# Patient Record
Sex: FEMALE | Race: BLACK OR AFRICAN AMERICAN | Employment: FULL TIME | URBAN - METROPOLITAN AREA
[De-identification: names, ages, dates, MRNs, and addresses within clinical notes are randomized per-mention and may not be internally consistent; named-entity substitution may affect disease eponyms.]

---

## 2017-01-04 ENCOUNTER — HOSPITAL ENCOUNTER (EMERGENCY)
Age: 52
Discharge: HOME | End: 2017-01-04
Attending: INTERNAL MEDICINE | Admitting: INTERNAL MEDICINE
Payer: COMMERCIAL

## 2017-01-04 VITALS
OXYGEN SATURATION: 99 % | HEART RATE: 73 BPM | WEIGHT: 271 LBS | DIASTOLIC BLOOD PRESSURE: 79 MMHG | BODY MASS INDEX: 36.7 KG/M2 | HEIGHT: 72 IN | SYSTOLIC BLOOD PRESSURE: 145 MMHG | RESPIRATION RATE: 10 BRPM | TEMPERATURE: 98 F

## 2017-01-04 DIAGNOSIS — J06.9 URI WITH COUGH AND CONGESTION: Primary | ICD-10-CM

## 2017-01-04 PROCEDURE — 99282 EMERGENCY DEPT VISIT SF MDM: CPT

## 2017-01-04 RX ORDER — PREDNISONE 20 MG/1
20 TABLET ORAL 3 TIMES DAILY
Qty: 9 TAB | Refills: 0 | Status: SHIPPED | OUTPATIENT
Start: 2017-01-04 | End: 2017-01-14

## 2017-01-04 RX ORDER — ALBUTEROL SULFATE 90 UG/1
1-2 AEROSOL, METERED RESPIRATORY (INHALATION)
Qty: 1 INHALER | Refills: 1 | Status: SHIPPED | OUTPATIENT
Start: 2017-01-04 | End: 2019-01-03

## 2017-01-04 RX ORDER — AZITHROMYCIN 250 MG/1
TABLET, FILM COATED ORAL
Qty: 6 TAB | Refills: 0 | Status: SHIPPED | OUTPATIENT
Start: 2017-01-04 | End: 2017-01-09

## 2017-01-05 NOTE — ED PROVIDER NOTES
Patient is a 45 y.o. female presenting with cough. The history is provided by the patient. Cough   The current episode started more than 1 week ago. The problem occurs constantly. Cough characteristics: scant production. There has been no fever. Associated symptoms include rhinorrhea and wheezing. Pertinent negatives include no chest pain, no sweats, no weight loss, no ear congestion, no ear pain, no headaches, no sore throat, no myalgias, no shortness of breath, no nausea, no vomiting and no confusion. Past Medical History:   Diagnosis Date    Acute bronchitis Jan 2015    Acute sinusitis     Anxiety     Arthritis     Back pain     Cocaine abuse with cocaine-induced disorder (HCC)     Delivery normal      x4    Dizziness     Gastrointestinal disorder      \"stomach ulcer\"    Other ill-defined conditions(799.89)      anemia    Palpitations     Peptic ulcer disease     Sciatica     TIA (transient ischemic attack)     Upper respiratory infection        Past Surgical History:   Procedure Laterality Date    Hx tonsillectomy      Hx gyn       tubal ligation    Pr abdomen surgery proc unlisted       ulcer    Hx tonsillectomy           Family History:   Problem Relation Age of Onset    Stroke Mother     Heart Disease Mother        Social History     Social History    Marital status: SINGLE     Spouse name: N/A    Number of children: N/A    Years of education: N/A     Occupational History    Not on file. Social History Main Topics    Smoking status: Current Every Day Smoker     Packs/day: 0.50     Years: 15.00    Smokeless tobacco: Never Used    Alcohol use No    Drug use: No    Sexual activity: No     Other Topics Concern    Not on file     Social History Narrative         ALLERGIES: Penicillins and Hydrocodone    Review of Systems   Constitutional: Negative for weight loss. HENT: Positive for congestion and rhinorrhea. Negative for ear pain and sore throat.     Eyes: Negative for discharge. Respiratory: Positive for cough and wheezing. Negative for shortness of breath. Cardiovascular: Negative for chest pain. Gastrointestinal: Negative for nausea and vomiting. Musculoskeletal: Negative for myalgias. Neurological: Negative for headaches. Psychiatric/Behavioral: Negative for confusion. Vitals:    01/04/17 2006   BP: 145/79   Pulse: 73   Resp: 10   Temp: 98 °F (36.7 °C)   SpO2: 99%   Weight: 122.9 kg (271 lb)   Height: 6' (1.829 m)            Physical Exam   Constitutional: She is oriented to person, place, and time. She appears well-developed and well-nourished. HENT:   Head: Normocephalic and atraumatic. Right Ear: External ear normal.   Left Ear: External ear normal.   Nose: Nose normal.   Mouth/Throat: Oropharynx is clear and moist.   Eyes: Conjunctivae are normal. Pupils are equal, round, and reactive to light. Neck: Normal range of motion. Neck supple. Cardiovascular: Normal rate and regular rhythm. Pulmonary/Chest: Effort normal. No respiratory distress. She has no wheezes. She has no rales. Faint bilateral Rhonchi almost entirely cleared by coughing   Abdominal: Soft. Bowel sounds are normal. She exhibits no distension. There is no tenderness. Musculoskeletal: She exhibits no edema. Neurological: She is alert and oriented to person, place, and time. Skin: Skin is warm and dry. Psychiatric: She has a normal mood and affect.         MDM  Number of Diagnoses or Management Options     Amount and/or Complexity of Data Reviewed  Review and summarize past medical records: yes      ED Course       Procedures

## 2017-01-05 NOTE — ED NOTES
Seen for complaints of cold symptoms x 1 week. Complaints of headache, sneezing, runny nose, some sinus pressure, \"popping\" to both ears, productive cough with moderate amount of thick green sputum, chills. Denies N, V, D, afebrile. No changes to appetite. Emergency Department Nursing Plan of Care       The Nursing Plan of Care is developed from the Nursing assessment and Emergency Department Attending provider initial evaluation. The plan of care may be reviewed in the ED Provider note.     The Plan of Care was developed with the following considerations:   Patient / Family readiness to learn indicated by:verbalized understanding  Persons(s) to be included in education: patient  Barriers to Learning/Limitations:No    Signed     Jacinto Manzano    1/4/2017   8:28 PM

## 2017-01-05 NOTE — DISCHARGE INSTRUCTIONS

## 2017-01-13 ENCOUNTER — APPOINTMENT (OUTPATIENT)
Dept: GENERAL RADIOLOGY | Age: 52
End: 2017-01-13
Attending: EMERGENCY MEDICINE
Payer: COMMERCIAL

## 2017-01-13 ENCOUNTER — APPOINTMENT (OUTPATIENT)
Dept: ULTRASOUND IMAGING | Age: 52
End: 2017-01-13
Attending: EMERGENCY MEDICINE
Payer: COMMERCIAL

## 2017-01-13 ENCOUNTER — HOSPITAL ENCOUNTER (EMERGENCY)
Age: 52
Discharge: HOME | End: 2017-01-13
Attending: EMERGENCY MEDICINE
Payer: COMMERCIAL

## 2017-01-13 VITALS
TEMPERATURE: 98 F | RESPIRATION RATE: 14 BRPM | BODY MASS INDEX: 36.91 KG/M2 | OXYGEN SATURATION: 97 % | WEIGHT: 272.49 LBS | DIASTOLIC BLOOD PRESSURE: 83 MMHG | SYSTOLIC BLOOD PRESSURE: 131 MMHG | HEART RATE: 72 BPM | HEIGHT: 72 IN

## 2017-01-13 DIAGNOSIS — M71.22 BAKER'S CYST OF KNEE, LEFT: Primary | ICD-10-CM

## 2017-01-13 DIAGNOSIS — I49.3 PVC (PREMATURE VENTRICULAR CONTRACTION): ICD-10-CM

## 2017-01-13 LAB
ALBUMIN SERPL BCP-MCNC: 3.5 G/DL (ref 3.5–5)
ALBUMIN/GLOB SERPL: 0.8 {RATIO} (ref 1.1–2.2)
ALP SERPL-CCNC: 117 U/L (ref 45–117)
ALT SERPL-CCNC: 24 U/L (ref 12–78)
ANION GAP BLD CALC-SCNC: 8 MMOL/L (ref 5–15)
AST SERPL W P-5'-P-CCNC: 16 U/L (ref 15–37)
BASOPHILS # BLD AUTO: 0 K/UL (ref 0–0.1)
BASOPHILS # BLD: 1 % (ref 0–1)
BILIRUB SERPL-MCNC: 0.2 MG/DL (ref 0.2–1)
BUN SERPL-MCNC: 9 MG/DL (ref 6–20)
BUN/CREAT SERPL: 12 (ref 12–20)
CALCIUM SERPL-MCNC: 8.6 MG/DL (ref 8.5–10.1)
CHLORIDE SERPL-SCNC: 107 MMOL/L (ref 97–108)
CK SERPL-CCNC: 157 U/L (ref 26–192)
CO2 SERPL-SCNC: 28 MMOL/L (ref 21–32)
CREAT SERPL-MCNC: 0.77 MG/DL (ref 0.55–1.02)
EOSINOPHIL # BLD: 0.3 K/UL (ref 0–0.4)
EOSINOPHIL NFR BLD: 4 % (ref 0–7)
ERYTHROCYTE [DISTWIDTH] IN BLOOD BY AUTOMATED COUNT: 13.7 % (ref 11.5–14.5)
GLOBULIN SER CALC-MCNC: 4.2 G/DL (ref 2–4)
GLUCOSE SERPL-MCNC: 125 MG/DL (ref 65–100)
HCT VFR BLD AUTO: 36.5 % (ref 35–47)
HGB BLD-MCNC: 12.4 G/DL (ref 11.5–16)
LYMPHOCYTES # BLD AUTO: 53 % (ref 12–49)
LYMPHOCYTES # BLD: 3.6 K/UL (ref 0.8–3.5)
MCH RBC QN AUTO: 28.2 PG (ref 26–34)
MCHC RBC AUTO-ENTMCNC: 34 G/DL (ref 30–36.5)
MCV RBC AUTO: 83.1 FL (ref 80–99)
MONOCYTES # BLD: 0.4 K/UL (ref 0–1)
MONOCYTES NFR BLD AUTO: 6 % (ref 5–13)
NEUTS SEG # BLD: 2.5 K/UL (ref 1.8–8)
NEUTS SEG NFR BLD AUTO: 36 % (ref 32–75)
PLATELET # BLD AUTO: 278 K/UL (ref 150–400)
POTASSIUM SERPL-SCNC: 3.6 MMOL/L (ref 3.5–5.1)
PROT SERPL-MCNC: 7.7 G/DL (ref 6.4–8.2)
RBC # BLD AUTO: 4.39 M/UL (ref 3.8–5.2)
SODIUM SERPL-SCNC: 143 MMOL/L (ref 136–145)
TROPONIN I SERPL-MCNC: <0.04 NG/ML
WBC # BLD AUTO: 6.8 K/UL (ref 3.6–11)

## 2017-01-13 PROCEDURE — 71020 XR CHEST PA LAT: CPT

## 2017-01-13 PROCEDURE — 84484 ASSAY OF TROPONIN QUANT: CPT | Performed by: EMERGENCY MEDICINE

## 2017-01-13 PROCEDURE — 85025 COMPLETE CBC W/AUTO DIFF WBC: CPT | Performed by: EMERGENCY MEDICINE

## 2017-01-13 PROCEDURE — 80053 COMPREHEN METABOLIC PANEL: CPT | Performed by: EMERGENCY MEDICINE

## 2017-01-13 PROCEDURE — 36415 COLL VENOUS BLD VENIPUNCTURE: CPT | Performed by: EMERGENCY MEDICINE

## 2017-01-13 PROCEDURE — 93005 ELECTROCARDIOGRAM TRACING: CPT

## 2017-01-13 PROCEDURE — 82550 ASSAY OF CK (CPK): CPT | Performed by: EMERGENCY MEDICINE

## 2017-01-13 PROCEDURE — 99284 EMERGENCY DEPT VISIT MOD MDM: CPT

## 2017-01-13 PROCEDURE — 93971 EXTREMITY STUDY: CPT

## 2017-01-13 PROCEDURE — 73562 X-RAY EXAM OF KNEE 3: CPT

## 2017-01-13 RX ORDER — OXYCODONE AND ACETAMINOPHEN 5; 325 MG/1; MG/1
1 TABLET ORAL
Qty: 20 TAB | Refills: 0 | Status: SHIPPED | OUTPATIENT
Start: 2017-01-13 | End: 2017-05-18

## 2017-01-14 NOTE — ED NOTES
PT ambulates well to restroom. Pt given urine cup and wipe, and instructed on clean catch technique.

## 2017-01-14 NOTE — DISCHARGE INSTRUCTIONS
Baker's Cyst: Care Instructions  Your Care Instructions    A Baker's cyst is a swelling behind the knee. It may cause pain or stiffness when you bend your knee or straighten it all the way. Baker's cysts are also called popliteal cysts. If you have arthritis or another condition that is the cause of the Baker's cyst, your doctor may treat that condition. A Baker's cyst may go away on its own. If not, or if it is causing a lot of discomfort, your doctor may drain the fluid that has built up behind the knee. In some cases, a Baker's cyst is removed in surgery. There are things you can do at home, such as staying off your leg, to reduce the swelling and pain. Follow-up care is a key part of your treatment and safety. Be sure to make and go to all appointments, and call your doctor if you are having problems. It's also a good idea to know your test results and keep a list of the medicines you take. How can you care for yourself at home? · Rest your knee as much as possible. · Ask your doctor if you can take an over-the-counter pain medicine, such as acetaminophen (Tylenol), ibuprofen (Advil, Motrin), or naproxen (Aleve). Be safe with medicines. Read and follow all instructions on the label. · Use a cane, a crutch, a walker, or another device if you need help to get around. These can help rest your knees. · If you have an elastic bandage, make sure it is snug but not so tight that your leg is numb, tingles, or swells below the bandage. Ask your doctor if you can loosen the bandage if it is too tight. · Follow your doctor's instructions about how much weight you can put on your knee. · Stay at a healthy weight. Being overweight puts extra strain on your knee. When should you call for help? Call 911 anytime you think you may need emergency care. For example, call if:  · You have sudden chest pain and shortness of breath, and you cough up blood.   Call your doctor now or seek immediate medical care if:  · You have signs of a blood clot, such as:  ¨ Pain in your calf, thigh, or groin. ¨ Redness and swelling in your leg or groin. · You have sudden swelling, warmth, or pain in any joint. · You have joint pain and a fever or rash. · You have such bad pain that you cannot use the joint. Watch closely for changes in your health, and be sure to contact your doctor if:  · You have mild joint symptoms that continue even with more than 6 weeks of care at home. · You have stomach pain or other problems with your medicine. Where can you learn more? Go to http://vernoica-janene.info/. Enter C574 in the search box to learn more about \"Baker's Cyst: Care Instructions. \"  Current as of: May 23, 2016  Content Version: 11.1  © 9539-8622 [employer]. Care instructions adapted under license by [employer] (which disclaims liability or warranty for this information). If you have questions about a medical condition or this instruction, always ask your healthcare professional. [place] [address] any warranty or liability for your use of this information. Premature Heartbeat: Care Instructions  Your Care Instructions    A premature heartbeat happens when the heart beats earlier than it should. This briefly interrupts the heart's rhythm. You do not usually feel the early heartbeat, and the next beat is stronger. To many people, this feels like a skipped heartbeat or a flutter. If you have no heart problems, premature heartbeats are not a cause for concern. Most people have them at some time. They may happen more often if you drink a lot of caffeine or alcohol or are under stress. Usually, no cause for a premature heartbeat is found, and no treatment is needed. Follow-up care is a key part of your treatment and safety. Be sure to make and go to all appointments, and call your doctor if you are having problems.  It's also a good idea to know your test results and keep a list of the medicines you take. How can you care for yourself at home? · Limit caffeine and other stimulants if they trigger premature heartbeats. · Reduce stress. Avoid people and places that make you feel anxious, if you can. Learn ways to reduce stress, such as biofeedback, guided imagery, and meditation. · Do not smoke or allow others to smoke around you. If you need help quitting, talk to your doctor about stop-smoking programs and medicines. These can increase your chances of quitting for good. · Get at least 30 minutes of exercise on most days of the week. Walking is a good choice. You also may want to do other activities, such as running, swimming, cycling, or playing tennis or team sports. · Get enough sleep. Keep your room dark and quiet, and try to go to bed at the same time every night. · Limit alcohol to 2 drinks a day for men and 1 drink a day for women. Too much alcohol can cause health problems. If drinking alcohol causes more premature heartbeats, do not drink it. · If your doctor prescribes medicine, take it exactly as prescribed. Call your doctor if you think you are having a problem with your medicine. When should you call for help? Call 911 anytime you think you may need emergency care. For example, call if:  · You passed out (lost consciousness). · You have severe shortness of breath. Call your doctor now or seek immediate medical care if:  · You have a heart rate that stays above 120 beats per minute for more than a few minutes. · You are suddenly dizzy. Watch closely for changes in your health, and be sure to contact your doctor if you have any problems. Where can you learn more? Go to http://veronica-janene.info/. Enter K026 in the search box to learn more about \"Premature Heartbeat: Care Instructions. \"  Current as of: January 27, 2016  Content Version: 11.1  © 1731-3002 [employer].  Care instructions adapted under license by [employer] (which disclaims liability or warranty for this information). If you have questions about a medical condition or this instruction, always ask your healthcare professional. [address] any warranty or liability for your use of this information.

## 2017-01-14 NOTE — ED NOTES
Pt resting quietly in bed, verbalizes no needs at this time, call bell within reach, VSS, will continue to monitor.

## 2017-01-14 NOTE — ED PROVIDER NOTES
HPI Comments:   Layne Woods is a 47 y.o. female with a hx of arthritis, PUD, anxiety, and TIA presenting to the ED with her daughter C/O palpitations which started last night. She is also c/o left posterior knee pain which started earlier today while at work. Pt denies birth control use, hormone therapy use, or recent surgeries. She last took several plane trips in 11/2016. Pt denies any FHx of DVT/PE. She is a former smoker that quit 1 year ago. Patient denies calf pain, CP, SOB, or any other symptoms or complaints. PCP: Yunior Retana MD    There are no other complaints, changes or physical findings at this time. Written by RALPH Desouza, as dictated by Aga Orellana MD      The history is provided by the patient. No  was used. Past Medical History:   Diagnosis Date    Acute bronchitis Jan 2015    Acute sinusitis     Anxiety     Arthritis     Back pain     Cocaine abuse with cocaine-induced disorder (HCC)     Delivery normal      x4    Dizziness     Gastrointestinal disorder      \"stomach ulcer\"    Other ill-defined conditions(799.89)      anemia    Palpitations     Peptic ulcer disease     Sciatica     TIA (transient ischemic attack)     Upper respiratory infection        Past Surgical History:   Procedure Laterality Date    Hx tonsillectomy      Hx gyn       tubal ligation    Pr abdomen surgery proc unlisted       ulcer    Hx tonsillectomy           Family History:   Problem Relation Age of Onset    Stroke Mother     Heart Disease Mother        Social History     Social History    Marital status: SINGLE     Spouse name: N/A    Number of children: N/A    Years of education: N/A     Occupational History    Not on file.      Social History Main Topics    Smoking status: Former Smoker     Packs/day: 0.50     Years: 15.00     Quit date: 4/2/2016    Smokeless tobacco: Never Used    Alcohol use No    Drug use: No    Sexual activity: No Other Topics Concern    Not on file     Social History Narrative         ALLERGIES: Penicillins and Hydrocodone    Review of Systems   Constitutional: Negative for activity change, chills and fever. HENT: Negative for congestion and sore throat. Eyes: Negative for pain and redness. Respiratory: Negative for cough, chest tightness and shortness of breath. Cardiovascular: Positive for palpitations. Negative for chest pain. Gastrointestinal: Negative for abdominal pain, diarrhea, nausea and vomiting. Genitourinary: Negative for dysuria, frequency and urgency. Musculoskeletal: Positive for arthralgias (left knee). Negative for back pain and neck pain. No calf pain   Skin: Negative for rash. Neurological: Negative for syncope, light-headedness and headaches. Psychiatric/Behavioral: Negative for confusion. All other systems reviewed and are negative. Vitals:    01/13/17 1945 01/13/17 2048 01/13/17 2100 01/13/17 2145   BP: (!) 172/107 122/74 111/86 131/83   Pulse: 93 72 68 72   Resp: 22 17 16 14   Temp:       SpO2: 99% 98% 99% 97%   Weight:       Height:                Physical Exam   Constitutional: She is oriented to person, place, and time. She appears well-developed and well-nourished. No distress. HENT:   Head: Normocephalic. Nose: Nose normal.   Mouth/Throat: Oropharynx is clear and moist. No oropharyngeal exudate. Eyes: Conjunctivae are normal. Pupils are equal, round, and reactive to light. No scleral icterus. Neck: Normal range of motion. Neck supple. No JVD present. No tracheal deviation present. No thyromegaly present. Cardiovascular: Normal rate, regular rhythm and intact distal pulses. Exam reveals no gallop and no friction rub. No murmur heard. Pulmonary/Chest: Effort normal and breath sounds normal. No stridor. No respiratory distress. She has no wheezes. She has no rales. Abdominal: Soft. Bowel sounds are normal. She exhibits no distension.  There is no tenderness. There is no rebound and no guarding. Musculoskeletal: Normal range of motion. L LE/ knee: No rash; no calf swelling or ttp; +mild ttp and swelling in popliteal fossa;  no increased warmth; full arom without difficulty; stable to varus and valgus stress; negative anterior drawer and lachman; 2+ dp and pt pulses; brisk cr; skin intact; toes up/down;    Lymphadenopathy:     She has no cervical adenopathy. Neurological: She is alert and oriented to person, place, and time. No cranial nerve deficit. She exhibits normal muscle tone. Coordination normal.   Skin: Skin is warm and dry. No rash noted. She is not diaphoretic. No erythema. Psychiatric: She has a normal mood and affect. Her behavior is normal.   Nursing note and vitals reviewed. MDM  Number of Diagnoses or Management Options  Baker's cyst of knee, left:   PVC (premature ventricular contraction):   Diagnosis management comments: DDx: DVT, Baker's cyst, dysrhythmia, ACS       Amount and/or Complexity of Data Reviewed  Clinical lab tests: ordered and reviewed  Tests in the radiology section of CPT®: ordered and reviewed  Tests in the medicine section of CPT®: ordered and reviewed  Independent visualization of images, tracings, or specimens: yes    Risk of Complications, Morbidity, and/or Mortality  General comments: No acute ekg changes; PVC while on telemetry; vss; negative cardiac enzymes; clear cxr; cmp unremarkable; le doppler negative for dvt but positive for baker's cyst; reassured pt; pt agreed to follow up with ortho next week; Mic Ledesma MD      Patient Progress  Patient progress: stable    Procedures    ED EKG interpretation: 17:51  Rhythm: normal sinus rhythm; and regular . Rate (approx.): 86; Axis: normal; MO Interval: normal; QRS interval: normal ; ST/T wave: non-specific changes; This EKG was interpreted by Mic Ledesma MD,ED Provider.       LABORATORY TESTS:  Recent Results (from the past 12 hour(s))   EKG, 12 LEAD, INITIAL    Collection Time: 01/13/17  5:51 PM   Result Value Ref Range    Ventricular Rate 86 BPM    Atrial Rate 86 BPM    P-R Interval 174 ms    QRS Duration 82 ms    Q-T Interval 380 ms    QTC Calculation (Bezet) 454 ms    Calculated P Axis 52 degrees    Calculated R Axis -17 degrees    Calculated T Axis 43 degrees    Diagnosis       Normal sinus rhythm  Cannot rule out Anterior infarct (cited on or before 03-OCT-2015)  When compared with ECG of 03-OCT-2015 06:57,  No significant change was found     CBC WITH AUTOMATED DIFF    Collection Time: 01/13/17  7:48 PM   Result Value Ref Range    WBC 6.8 3.6 - 11.0 K/uL    RBC 4.39 3.80 - 5.20 M/uL    HGB 12.4 11.5 - 16.0 g/dL    HCT 36.5 35.0 - 47.0 %    MCV 83.1 80.0 - 99.0 FL    MCH 28.2 26.0 - 34.0 PG    MCHC 34.0 30.0 - 36.5 g/dL    RDW 13.7 11.5 - 14.5 %    PLATELET 680 394 - 781 K/uL    NEUTROPHILS 36 32 - 75 %    LYMPHOCYTES 53 (H) 12 - 49 %    MONOCYTES 6 5 - 13 %    EOSINOPHILS 4 0 - 7 %    BASOPHILS 1 0 - 1 %    ABS. NEUTROPHILS 2.5 1.8 - 8.0 K/UL    ABS. LYMPHOCYTES 3.6 (H) 0.8 - 3.5 K/UL    ABS. MONOCYTES 0.4 0.0 - 1.0 K/UL    ABS. EOSINOPHILS 0.3 0.0 - 0.4 K/UL    ABS. BASOPHILS 0.0 0.0 - 0.1 K/UL   METABOLIC PANEL, COMPREHENSIVE    Collection Time: 01/13/17  7:48 PM   Result Value Ref Range    Sodium 143 136 - 145 mmol/L    Potassium 3.6 3.5 - 5.1 mmol/L    Chloride 107 97 - 108 mmol/L    CO2 28 21 - 32 mmol/L    Anion gap 8 5 - 15 mmol/L    Glucose 125 (H) 65 - 100 mg/dL    BUN 9 6 - 20 MG/DL    Creatinine 0.77 0.55 - 1.02 MG/DL    BUN/Creatinine ratio 12 12 - 20      GFR est AA >60 >60 ml/min/1.73m2    GFR est non-AA >60 >60 ml/min/1.73m2    Calcium 8.6 8.5 - 10.1 MG/DL    Bilirubin, total 0.2 0.2 - 1.0 MG/DL    ALT 24 12 - 78 U/L    AST 16 15 - 37 U/L    Alk.  phosphatase 117 45 - 117 U/L    Protein, total 7.7 6.4 - 8.2 g/dL    Albumin 3.5 3.5 - 5.0 g/dL    Globulin 4.2 (H) 2.0 - 4.0 g/dL    A-G Ratio 0.8 (L) 1.1 - 2.2     CK W/ REFLX CKMB    Collection Time: 01/13/17  7:48 PM   Result Value Ref Range     26 - 192 U/L   TROPONIN I    Collection Time: 01/13/17  7:48 PM   Result Value Ref Range    Troponin-I, Qt. <0.04 <0.05 ng/mL       IMAGING RESULTS:  DUPLEX LOWER EXT VENOUS LEFT   Final Result   History: Pain and swelling.     Duplex venous Doppler examination was performed from the left inguinal ligament  to the mid-calf. Deep venous structures were well imaged and appeared  compressible throughout. Both wave form and color flow analysis demonstrated  normal flow patterns. Augmentation was demonstrable. There is a 4.6 x 1.2 x 2.9  cm cyst in the left popliteal fossa.     IMPRESSION  IMPRESSION:  NORMAL DUPLEX VENOUS DOPPLER EXAMINATION. Incidental Baker's cyst.  Signed by      Signed Date/Time    Phone Pager     Chelly Coppola 1/13/2017 21:04 [phone]       XR CHEST PA LAT   Final Result   History: Chest pain.     Frontal and lateral views of the chest show clear lungs. The heart, mediastinum  and pulmonary vasculature are normal. There are mild degenerative changes of  the spine.     IMPRESSION  IMPRESSION:  NORMAL CHEST. Signed by      Signed Date/Time    Phone Pager     Chelly Coppola 1/13/2017 20:50 [phone]       XR KNEE LT 3 V   Final Result   EXAM: XR KNEE LT 3 V     INDICATION: pain.     COMPARISON: None.     FINDINGS: Three views of the left knee demonstrate no fracture or other acute  osseous or articular abnormality. There is no effusion. There is mild  degenerative spurring.     IMPRESSION  IMPRESSION: No acute abnormality. Signed by      Signed Date/Time    Phone Pager     Chelly Coppola 1/13/2017 19:32 [phone]           IMPRESSION:  1. Baker's cyst of knee, left    2. PVC (premature ventricular contraction)        PLAN:  1.    Current Discharge Medication List      START taking these medications    Details   oxyCODONE-acetaminophen (PERCOCET) 5-325 mg per tablet Take 1 Tab by mouth every four (4) hours as needed for Pain. Max Daily Amount: 6 Tabs. Qty: 20 Tab, Refills: 0         CONTINUE these medications which have NOT CHANGED    Details   albuterol (PROVENTIL HFA, VENTOLIN HFA, PROAIR HFA) 90 mcg/actuation inhaler Take 1-2 Puffs by inhalation every four (4) hours as needed for Wheezing. Qty: 1 Inhaler, Refills: 1      predniSONE (DELTASONE) 20 mg tablet Take 1 Tab by mouth three (3) times daily for 10 days. With Breakfast  Qty: 9 Tab, Refills: 0      ALPRAZolam (XANAX) 1 mg tablet Take 1 Tab by mouth three (3) times daily as needed. Qty: 20 Tab, Refills: 0           2. Follow-up Information     Follow up With Details Comments [address] MD Schedule an appointment as soon as possible for a visit in 3 days  [address] [place] [address]  [phone]          Return to ED if worse     Discharge Note:  9:51 PM  The patient is ready for discharge. The patient's signs, symptoms, diagnosis, and discharge instruction have been discussed and the patient has conveyed their understanding. The patient is to follow up as recommended or return to the ER should their symptoms worsen. Plan has been discussed and the patient is in agreement. Written by Pham Ly, ED Scribe, as dictated by Stephania Kline MD.     Attestation: This note is prepared by Pham Ly, acting as Scribe for Stephania Kline MD.    Stephania Kline MD: The scribe's documentation has been prepared under my direction and personally reviewed by me in its entirety. I confirm that the note above accurately reflects all work, treatment, procedures, and medical decision making performed by me.

## 2017-01-16 LAB
ATRIAL RATE: 86 BPM
CALCULATED P AXIS, ECG09: 52 DEGREES
CALCULATED R AXIS, ECG10: -17 DEGREES
CALCULATED T AXIS, ECG11: 43 DEGREES
DIAGNOSIS, 93000: NORMAL
P-R INTERVAL, ECG05: 174 MS
Q-T INTERVAL, ECG07: 380 MS
QRS DURATION, ECG06: 82 MS
QTC CALCULATION (BEZET), ECG08: 454 MS
VENTRICULAR RATE, ECG03: 86 BPM

## 2017-02-12 ENCOUNTER — HOSPITAL ENCOUNTER (EMERGENCY)
Age: 52
Discharge: HOME | End: 2017-02-12
Attending: EMERGENCY MEDICINE
Payer: COMMERCIAL

## 2017-02-12 VITALS
DIASTOLIC BLOOD PRESSURE: 87 MMHG | SYSTOLIC BLOOD PRESSURE: 127 MMHG | OXYGEN SATURATION: 100 % | BODY MASS INDEX: 36.7 KG/M2 | HEART RATE: 92 BPM | HEIGHT: 72 IN | RESPIRATION RATE: 20 BRPM | TEMPERATURE: 100 F | WEIGHT: 271 LBS

## 2017-02-12 DIAGNOSIS — R68.89 FLU-LIKE SYMPTOMS: Primary | ICD-10-CM

## 2017-02-12 LAB
FLUAV AG NPH QL IA: NEGATIVE
FLUBV AG NOSE QL IA: NEGATIVE

## 2017-02-12 PROCEDURE — 87804 INFLUENZA ASSAY W/OPTIC: CPT | Performed by: EMERGENCY MEDICINE

## 2017-02-12 PROCEDURE — 74011250637 HC RX REV CODE- 250/637: Performed by: EMERGENCY MEDICINE

## 2017-02-12 PROCEDURE — 99283 EMERGENCY DEPT VISIT LOW MDM: CPT

## 2017-02-12 RX ORDER — IBUPROFEN 800 MG/1
800 TABLET ORAL
Qty: 20 TAB | Refills: 0 | Status: SHIPPED | OUTPATIENT
Start: 2017-02-12 | End: 2017-02-19

## 2017-02-12 RX ORDER — IBUPROFEN 400 MG/1
800 TABLET ORAL
Status: COMPLETED | OUTPATIENT
Start: 2017-02-12 | End: 2017-02-12

## 2017-02-12 RX ADMIN — IBUPROFEN 800 MG: 400 TABLET, FILM COATED ORAL at 09:13

## 2017-02-12 NOTE — DISCHARGE INSTRUCTIONS
Influenza (Flu) Vaccine: Care Instructions  Your Care Instructions  Influenza (flu) is an infection in the lungs and breathing passages. It is caused by the influenza virus. There are different strains, or types, of the flu virus every year. The flu comes on quickly. It can cause a cough, stuffy nose, fever, chills, tiredness, and aches and pains. These symptoms may last up to 10 days. The flu can make you feel very sick, but most of the time it doesn't lead to other problems. But it can cause serious problems in people who are older or who have a long-term illness, such as heart disease or diabetes. You can help prevent the flu by getting a flu vaccine every year, as soon as it is available. You cannot get the flu from the vaccine. The vaccine prevents most cases of the flu. But even when the vaccine doesn't prevent the flu, it can make symptoms less severe and reduce the chance of problems from the flu. Sometimes, young children and people who have an immune system problem may have a slight fever or muscle aches or pains 6 to 12 hours after getting the shot. These symptoms usually last 1 or 2 days. Follow-up care is a key part of your treatment and safety. Be sure to make and go to all appointments, and call your doctor if you are having problems. It's also a good idea to know your test results and keep a list of the medicines you take. Who should get the flu vaccine? Everyone age 7 months or older should get a flu vaccine each year. It lowers the chance of getting and spreading the flu. The vaccine is very important for people who are at high risk for getting other health problems from the flu. This includes:  · Anyone 48years of age or older. · People who live in a long-term care center, such as a nursing home. · All children 6 months through 25years of age. · Adults and children 6 months and older who have long-term heart or lung problems, such as asthma.   · Adults and children 6 months and older who needed medical care or were in a hospital during the past year because of diabetes, chronic kidney disease, or a weak immune system (including HIV or AIDS). · Women who will be pregnant during the flu season. · People who have any condition that can make it hard to breathe or swallow (such as a brain injury or muscle disorders). · People who can give the flu to others who are at high risk for problems from the flu. This includes all health care workers and close contacts of people age 72 or older. Who should not get the flu vaccine? The person who gives the vaccine may tell you not to get it if you:  · Have a severe allergy to eggs or any part of the vaccine. · Have had a severe reaction to a flu vaccine in the past.  · Have had Guillain-Barré syndrome (GBS). · Are sick with a fever. (Get the vaccine when symptoms are gone.)  How can you care for yourself at home? · If you or your child has a sore arm or a slight fever after the shot, take an over-the-counter pain medicine, such as acetaminophen (Tylenol) or ibuprofen (Advil, Motrin). Read and follow all instructions on the label. Do not give aspirin to anyone younger than 20. It has been linked to Reye syndrome, a serious illness. · Do not take two or more pain medicines at the same time unless the doctor told you to. Many pain medicines have acetaminophen, which is Tylenol. Too much acetaminophen (Tylenol) can be harmful. When should you call for help? Call 911 anytime you think you may need emergency care. For example, call if after getting the flu vaccine:  · You have symptoms of a severe reaction to the flu vaccine. Symptoms of a severe reaction may include:  ¨ Severe difficulty breathing. ¨ Sudden raised, red areas (hives) all over your body. ¨ Severe lightheadedness. Call your doctor now or seek immediate medical care if after getting the flu vaccine:  · You think you are having a reaction to the flu vaccine, such as a new fever.   Watch closely for changes in your health, and be sure to contact your doctor if you have any problems. Where can you learn more? Go to http://veronica-janene.info/. Enter F991 in the search box to learn more about \"Influenza (Flu) Vaccine: Care Instructions. \"  Current as of: August 1, 2016  Content Version: 11.1  © 1772-5725 [employer]. Care instructions adapted under license by [employer] (which disclaims liability or warranty for this information). If you have questions about a medical condition or this instruction, always ask your healthcare professional. [address] any warranty or liability for your use of this information.

## 2017-02-12 NOTE — ED PROVIDER NOTES
Patient is a 47 y.o. female presenting with general illness and flu symptoms. The history is provided by the patient and medical records. No  was used. Generalized Body Aches   Associated symptoms include headaches. Flu   This is a new problem. The current episode started yesterday. The problem occurs constantly. The problem has not changed since onset. The cough is non-productive. Patient reports a subjective fever - was not measured. Associated symptoms include chills, sweats, headaches and myalgias. Treatments tried: goodys. The treatment provided mild relief. Her past medical history is significant for bronchitis. Pt with granddaughter pos for flu, began having sx yesterday, unvaccinated. Past Medical History:   Diagnosis Date    Acute bronchitis Jan 2015    Acute sinusitis     Anxiety     Arthritis     Back pain     Cocaine abuse with cocaine-induced disorder (HCC)     Delivery normal      x4    Dizziness     Gastrointestinal disorder      \"stomach ulcer\"    Other ill-defined conditions(799.89)      anemia    Palpitations     Peptic ulcer disease     Sciatica     TIA (transient ischemic attack)     Upper respiratory infection        Past Surgical History:   Procedure Laterality Date    Hx tonsillectomy      Hx gyn       tubal ligation    Pr abdomen surgery proc unlisted       ulcer    Hx tonsillectomy           Family History:   Problem Relation Age of Onset    Stroke Mother     Heart Disease Mother        Social History     Social History    Marital status: SINGLE     Spouse name: N/A    Number of children: N/A    Years of education: N/A     Occupational History    Not on file.      Social History Main Topics    Smoking status: Former Smoker     Packs/day: 0.50     Years: 15.00     Quit date: 4/2/2016    Smokeless tobacco: Never Used    Alcohol use No    Drug use: No    Sexual activity: No     Other Topics Concern    Not on file     Social History Narrative         ALLERGIES: Penicillins and Hydrocodone    Review of Systems   Constitutional: Positive for chills and fever. Respiratory: Positive for cough. Musculoskeletal: Positive for myalgias. Neurological: Positive for headaches. All other systems reviewed and are negative. Vitals:    02/12/17 0811   BP: (!) 155/95   Pulse: (!) 109   Resp: 20   Temp: 100 °F (37.8 °C)   SpO2: 100%   Weight: 122.9 kg (271 lb)   Height: 6' (1.829 m)            Physical Exam   Constitutional: She is oriented to person, place, and time. She appears well-developed and well-nourished. No distress. Obese Black female, febrile     HENT:   Head: Normocephalic and atraumatic. Right Ear: External ear normal.   Left Ear: External ear normal.   Nose: Nose normal.   Mouth/Throat: Oropharynx is clear and moist. No oropharyngeal exudate. Eyes: Conjunctivae and EOM are normal. Pupils are equal, round, and reactive to light. Right eye exhibits no discharge. Left eye exhibits no discharge. No scleral icterus. Neck: Normal range of motion. Neck supple. No tracheal deviation present. No thyromegaly present. Cardiovascular: Normal rate, regular rhythm and normal heart sounds. No murmur heard. Pulmonary/Chest: Effort normal and breath sounds normal. No respiratory distress. She has no wheezes. She has no rales. Abdominal: Soft. Bowel sounds are normal.   Musculoskeletal: Normal range of motion. Lymphadenopathy:     She has no cervical adenopathy. Neurological: She is alert and oriented to person, place, and time. Skin: Skin is warm and dry. She is not diaphoretic. No erythema. Psychiatric: She has a normal mood and affect. Her behavior is normal.   Nursing note and vitals reviewed.        [facility]  ED Course       Procedures

## 2017-02-12 NOTE — ED NOTES
Discharge instructions were given to the patient by Jessica Hinson RN. Patient was given 1 prescriptions and was encouraged to call or return to the ED for worsening issues or problems and was encouraged to schedule a follow up appointment for continuing care. Patient given a current medication reconciliation form and verbalized understanding of their medications and importance of discussing medications at follow-up. The patient verbalized understanding of discharge instructions and prescriptions, all questions were answered. The patient has no further concerns at this time. Patient stable at time of discharge. The patient left the Emergency Department ambulatory, with self, and in no acute distress. Patient declined wheelchair transport out of Emergency Department.

## 2017-05-18 ENCOUNTER — HOSPITAL ENCOUNTER (EMERGENCY)
Age: 52
Discharge: HOME | End: 2017-05-18
Attending: INTERNAL MEDICINE | Admitting: INTERNAL MEDICINE
Payer: SELF-PAY

## 2017-05-18 VITALS
RESPIRATION RATE: 18 BRPM | BODY MASS INDEX: 35.76 KG/M2 | HEIGHT: 72 IN | WEIGHT: 264 LBS | DIASTOLIC BLOOD PRESSURE: 81 MMHG | HEART RATE: 107 BPM | SYSTOLIC BLOOD PRESSURE: 144 MMHG | TEMPERATURE: 100.6 F | OXYGEN SATURATION: 96 %

## 2017-05-18 DIAGNOSIS — J01.00 ACUTE MAXILLARY SINUSITIS, RECURRENCE NOT SPECIFIED: Primary | ICD-10-CM

## 2017-05-18 PROCEDURE — 99283 EMERGENCY DEPT VISIT LOW MDM: CPT

## 2017-05-18 PROCEDURE — 74011250637 HC RX REV CODE- 250/637: Performed by: INTERNAL MEDICINE

## 2017-05-18 RX ORDER — IBUPROFEN 400 MG/1
800 TABLET ORAL
Status: COMPLETED | OUTPATIENT
Start: 2017-05-18 | End: 2017-05-18

## 2017-05-18 RX ORDER — BUTALBITAL, ACETAMINOPHEN AND CAFFEINE 300; 40; 50 MG/1; MG/1; MG/1
1 CAPSULE ORAL
Qty: 20 CAP | Refills: 0 | Status: SHIPPED | OUTPATIENT
Start: 2017-05-18 | End: 2017-07-03

## 2017-05-18 RX ORDER — ACETAMINOPHEN 325 MG/1
650 TABLET ORAL
Status: COMPLETED | OUTPATIENT
Start: 2017-05-18 | End: 2017-05-18

## 2017-05-18 RX ORDER — AZITHROMYCIN 250 MG/1
TABLET, FILM COATED ORAL
Qty: 6 TAB | Refills: 0 | Status: SHIPPED | OUTPATIENT
Start: 2017-05-18 | End: 2017-05-23

## 2017-05-18 RX ADMIN — ACETAMINOPHEN 650 MG: 325 TABLET ORAL at 18:14

## 2017-05-18 RX ADMIN — IBUPROFEN 800 MG: 400 TABLET, FILM COATED ORAL at 18:14

## 2017-05-18 NOTE — LETTER
Remy 82 
[facility] EMERGENCY DEPT 
[address] [place] [address] [id number] [phone] Work/School Note Date: 5/18/2017 To Whom It May concern: 
 
Rafa Argueta was seen and treated today in the emergency room by the following provider(s): 
Attending Provider: Nash Murphy MD.   
 
Rafa Argueta may return to work on 5/20/17.  
 
Sincerely, 
 
 
 
 
Jessica Ingram

## 2017-05-18 NOTE — ED TRIAGE NOTES
Sore throat, ear pain, fever/chills, and gen.  Body aches starting today around lunchtime, works at a

## 2017-05-18 NOTE — ED PROVIDER NOTES
HPI Comments: Rafa Argueta is a 47 y.o. female, pmhx significant for anemia, recurrent URI/sinusitis, PUD, and anxiety, who presents ambulatory to the ED c/o headache x 1200, and productive cough with green sputum x 1 day. Pt also reports associated myalgias, sore throat, pain with swallowing, bilateral ear pain, and fever x 1 day. Pt does not endorse any exacerbating or alleviating factors for her recent symptom onset. Pt denies taking any OTC medications for her sxs. Pt states that she works in a , and may have had sick contact. Pt denies nausea, vomiting, diarrhea, chest pain, or SOB. Pt denies h/o DM, or HTN    PCP: Franck Miranda MD    Occupational Hx:   PSHx: Significant for tubal ligation, tonsillectomy, sinus surgery   Social Hx: - tobacco (former), - EtOH, - Illicit Drugs (h/o cocaine abuse, denies drug abuse at this time)    There are no other complaints, changes, or physical findings at this time. The history is provided by the patient. No  was used.         Past Medical History:   Diagnosis Date    Acute bronchitis Jan 2015    Acute sinusitis     Anxiety     Arthritis     Back pain     Cocaine abuse with cocaine-induced disorder (HCC)     Delivery normal     x4    Dizziness     Gastrointestinal disorder     \"stomach ulcer\"    Other ill-defined conditions     anemia    Palpitations     Peptic ulcer disease     Sciatica     TIA (transient ischemic attack)     Upper respiratory infection        Past Surgical History:   Procedure Laterality Date    ABDOMEN SURGERY PROC UNLISTED      ulcer    HX GYN      tubal ligation    HX HEENT      sinus surgery    HX TONSILLECTOMY      HX TONSILLECTOMY           Family History:   Problem Relation Age of Onset    Stroke Mother     Heart Disease Mother        Social History     Social History    Marital status: SINGLE     Spouse name: N/A    Number of children: N/A    Years of education: N/A Occupational History    Not on file. Social History Main Topics    Smoking status: Former Smoker     Packs/day: 0.50     Years: 15.00     Quit date: 4/2/2016    Smokeless tobacco: Never Used    Alcohol use No    Drug use: No    Sexual activity: No     Other Topics Concern    Not on file     Social History Narrative         ALLERGIES: Penicillins and Hydrocodone    Review of Systems   Constitutional: Positive for fever. Negative for activity change, appetite change, chills, diaphoresis and unexpected weight change. HENT: Positive for ear pain and sore throat. Negative for congestion, hearing loss, rhinorrhea, sinus pressure, sneezing and trouble swallowing. Eyes: Negative for pain, redness, itching and visual disturbance. Respiratory: Positive for cough. Negative for shortness of breath and wheezing. Cardiovascular: Negative for chest pain, palpitations and leg swelling. Gastrointestinal: Negative for abdominal pain, constipation, diarrhea, nausea and vomiting. Genitourinary: Negative for dysuria. Musculoskeletal: Positive for myalgias. Negative for arthralgias and gait problem. Skin: Negative for color change, pallor, rash and wound. Neurological: Negative for tremors, weakness, light-headedness, numbness and headaches. All other systems reviewed and are negative. Patient Vitals for the past 12 hrs:   Temp Pulse Resp BP SpO2   05/18/17 1939 (!) 100.6 °F (38.1 °C) - - - -   05/18/17 1902 (!) 102.7 °F (39.3 °C) - - - -   05/18/17 1840 (!) 102.3 °F (39.1 °C) - - - -   05/18/17 1818 - (!) 107 18 - 96 %   05/18/17 1758 - (!) 113 18 144/81 -   05/18/17 1755 (!) 100.9 °F (38.3 °C) (!) 115 18 (!) 156/101 100 %     Physical Exam   Constitutional: She is oriented to person, place, and time. She appears well-developed and well-nourished. HENT:   Head: Normocephalic and atraumatic.    Mouth/Throat: Oropharynx is clear and moist.   Frontal sinus pain   Eyes: Conjunctivae and EOM are normal. Pupils are equal, round, and reactive to light. Neck: Normal range of motion. Neck supple. Cardiovascular: Normal rate, regular rhythm and normal heart sounds. Exam reveals no gallop and no friction rub. No murmur heard. Pulmonary/Chest: Effort normal and breath sounds normal. No respiratory distress. She has no wheezes. She has no rales. Abdominal: Soft. Bowel sounds are normal. She exhibits no distension. There is no tenderness. There is no rebound and no guarding. Musculoskeletal: Normal range of motion. She exhibits no edema or tenderness. Lymphadenopathy:     She has no cervical adenopathy. Neurological: She is alert and oriented to person, place, and time. She has normal strength. No cranial nerve deficit or sensory deficit. She displays a negative Romberg sign. Coordination and gait normal.   Skin: Skin is warm and dry. No ecchymosis, no lesion and no rash noted. Rash is not urticarial. She is not diaphoretic. No erythema. Psychiatric: She has a normal mood and affect. Nursing note and vitals reviewed. MDM  Number of Diagnoses or Management Options  Acute maxillary sinusitis, recurrence not specified:   Diagnosis management comments:   DDx: URI, pharyngitis, strep, viral vs bacterial, sinusitis       Amount and/or Complexity of Data Reviewed  Review and summarize past medical records: yes    Patient Progress  Patient progress: stable    ED Course       Procedures     Progress Note:  7:18 PM  Pt re-evaluated. Pt's physical exam remains benign. Will provider pt with cold water. Chest XR discontinued. Pt is agreeable with plan. Written by RALPH Lenz, as dictated by Brandon Dunn MD.     Progress Note:  7:40 PM  Discussed workup results/findings, and counseled pt regarding her diagnosis. Pt has been encouraged to follow-up as instructed. All questions have been answered, and pt conveyed understanding.   Written by RALPH Lenz, as dictated by Dalila Aaron MD.    MEDICATIONS GIVEN:  Medications   acetaminophen (TYLENOL) tablet 650 mg (650 mg Oral Given 5/18/17 1814)   ibuprofen (MOTRIN) tablet 800 mg (800 mg Oral Given 5/18/17 1814)       IMPRESSION:  1. Acute maxillary sinusitis, recurrence not specified        PLAN:  1. Discharge home. Current Discharge Medication List      START taking these medications    Details   azithromycin (ZITHROMAX Z-ESTELITA) 250 mg tablet Take as directed  Qty: 6 Tab, Refills: 0      butalbital-acetaminophen-caff (FIORICET) -40 mg per capsule Take 1 Cap by mouth every six (6) hours as needed for Pain. Max Daily Amount: 4 Caps. Qty: 20 Cap, Refills: 0           2. Follow-up Information     Follow up With Details Comments [address], [place] In 2 days If symptoms worsen [facility] [phone]          3. Return to ED if worse       DISCHARGE NOTE:  7:40 PM  Patient's results have been reviewed with them. Patient and/or family have verbally conveyed their understanding and agreement of the patient's signs, symptoms, diagnosis, treatment and prognosis and additionally agree to follow up as recommended or return to the Emergency Room should their condition change prior to follow-up. Discharge instructions have also been provided to the patient with some educational information regarding their diagnosis as well a list of reasons why they would want to return to the ER prior to their follow-up appointment should their condition change. This note is prepared by Ilene Benitez, acting as Scribe for MD Dalila Salguero MD: The scribe's documentation has been prepared under my direction and personally reviewed by me in its entirety. I confirm that the note above accurately reflects all work, treatment, procedures, and medical decision making performed by me.

## 2017-05-18 NOTE — DISCHARGE INSTRUCTIONS
Saline Nasal Washes: Care Instructions  Your Care Instructions  Saline nasal washes help keep the nasal passages open by washing out thick or dried mucus. This simple remedy can help relieve symptoms of allergies, sinusitis, and colds. It also can make the nose feel more comfortable by keeping the mucous membranes moist. You may notice a little burning sensation in your nose the first few times you use the solution, but this usually gets better in a few days. Follow-up care is a key part of your treatment and safety. Be sure to make and go to all appointments, and call your doctor if you are having problems. It's also a good idea to know your test results and keep a list of the medicines you take. How can you care for yourself at home? · You can buy premixed saline solution in a squeeze bottle or other sinus rinse products at a drugstore. Read and follow the instructions on the label. · You also can make your own saline solution by adding 1 teaspoon of salt and 1 teaspoon of baking soda to 2 cups of distilled water. · If you use a homemade solution, pour a small amount into a clean bowl. Using a rubber bulb syringe, squeeze the syringe and place the tip in the salt water. Pull a small amount of the salt water into the syringe by relaxing your hand. · Sit down with your head tilted slightly back. Do not lie down. Put the tip of the bulb syringe or the squeeze bottle a little way into one of your nostrils. Gently drip or squirt a few drops into the nostril. Repeat with the other nostril. Some sneezing and gagging are normal at first.  · Gently blow your nose. · Wipe the syringe or bottle tip clean after each use. · Repeat this 2 or 3 times a day. · Use nasal washes gently if you have nosebleeds often. When should you call for help? Watch closely for changes in your health, and be sure to contact your doctor if:  · You often get nosebleeds. · You have problems doing the nasal washes.   Where can you learn more? Go to http://veronica-janene.info/. Enter 071 [id number] [id number] in the search box to learn more about \"Saline Nasal Washes: Care Instructions. \"  Current as of: July 29, 2016  Content Version: 11.2  © 5831-1298 [employer]. Care instructions adapted under license by [employer] - is now [employer] (which disclaims liability or warranty for this information). If you have questions about a medical condition or this instruction, always ask your healthcare professional. [address] any warranty or liability for your use of this information. Sinusitis: Care Instructions  Your Care Instructions    Sinusitis is an infection of the lining of the sinus cavities in your head. Sinusitis often follows a cold. It causes pain and pressure in your head and face. In most cases, sinusitis gets better on its own in 1 to 2 weeks. But some mild symptoms may last for several weeks. Sometimes antibiotics are needed. Follow-up care is a key part of your treatment and safety. Be sure to make and go to all appointments, and call your doctor if you are having problems. It's also a good idea to know your test results and keep a list of the medicines you take. How can you care for yourself at home? · Take an over-the-counter pain medicine, such as acetaminophen (Tylenol), ibuprofen (Advil, Motrin), or naproxen (Aleve). Read and follow all instructions on the label. · If the doctor prescribed antibiotics, take them as directed. Do not stop taking them just because you feel better. You need to take the full course of antibiotics. · Be careful when taking over-the-counter cold or flu medicines and Tylenol at the same time. Many of these medicines have acetaminophen, which is Tylenol. Read the labels to make sure that you are not taking more than the recommended dose. Too much acetaminophen (Tylenol) can be harmful.   · Breathe warm, moist air from a steamy shower, a hot bath, or a sink filled with hot water. Avoid cold, dry air. Using a humidifier in your home may help. Follow the directions for cleaning the machine. · Use saline (saltwater) nasal washes to help keep your nasal passages open and wash out mucus and bacteria. You can buy saline nose drops at a grocery store or drugstore. Or you can make your own at home by adding 1 teaspoon of salt and 1 teaspoon of baking soda to 2 cups of distilled water. If you make your own, fill a bulb syringe with the solution, insert the tip into your nostril, and squeeze gently. Natalie Bienenstock your nose. · Put a hot, wet towel or a warm gel pack on your face 3 or 4 times a day for 5 to 10 minutes each time. · Try a decongestant nasal spray like oxymetazoline (Afrin). Do not use it for more than 3 days in a row. Using it for more than 3 days can make your congestion worse. When should you call for help? Call your doctor now or seek immediate medical care if:  · You have new or worse swelling or redness in your face or around your eyes. · You have a new or higher fever. Watch closely for changes in your health, and be sure to contact your doctor if:  · You have new or worse facial pain. · The mucus from your nose becomes thicker (like pus) or has new blood in it. · You are not getting better as expected. Where can you learn more? Go to http://veronica-janene.info/. Enter L456 in the search box to learn more about \"Sinusitis: Care Instructions. \"  Current as of: July 29, 2016  Content Version: 11.2  © 7027-8411 [employer]. Care instructions adapted under license by [employer] (which disclaims liability or warranty for this information). If you have questions about a medical condition or this instruction, always ask your healthcare professional. [address] any warranty or liability for your use of this information.

## 2017-05-18 NOTE — ED NOTES
Bedside and Verbal shift change report given to Saranya Saez RN (oncoming nurse) by Lee Gates RN (offgoing nurse). Report included the following information SBAR, ED Summary, Procedure Summary and MAR.

## 2017-05-18 NOTE — ED NOTES
Pt medically cleared for discharge by MD. Pt temp 100.6 F. Pt left ED ambulatory with family members in NAD.

## 2017-05-18 NOTE — ED NOTES
Pt reports sore throat, bilateral ear pain, headache and body aches that began around mid day today, no swelling or redness noted to throat; reports cough with green secretions      Emergency Department Nursing Plan of Care       The Nursing Plan of Care is developed from the Nursing assessment and Emergency Department Attending provider initial evaluation. The plan of care may be reviewed in the ED Provider note.     The Plan of Care was developed with the following considerations:   Patient / Family readiness to learn indicated by:verbalized understanding  Persons(s) to be included in education: patient  Barriers to Learning/Limitations:No    Signed     Omer Sanford RN    5/18/2017   6:07 PM

## 2017-05-19 ENCOUNTER — APPOINTMENT (OUTPATIENT)
Dept: CT IMAGING | Age: 52
End: 2017-05-19
Attending: PHYSICIAN ASSISTANT
Payer: SELF-PAY

## 2017-05-19 ENCOUNTER — HOSPITAL ENCOUNTER (EMERGENCY)
Age: 52
Discharge: HOME | End: 2017-05-19
Attending: EMERGENCY MEDICINE
Payer: SELF-PAY

## 2017-05-19 VITALS
SYSTOLIC BLOOD PRESSURE: 135 MMHG | DIASTOLIC BLOOD PRESSURE: 76 MMHG | OXYGEN SATURATION: 99 % | WEIGHT: 265.65 LBS | RESPIRATION RATE: 18 BRPM | HEART RATE: 96 BPM | BODY MASS INDEX: 35.98 KG/M2 | TEMPERATURE: 99.9 F | HEIGHT: 72 IN

## 2017-05-19 DIAGNOSIS — J02.0 PHARYNGITIS DUE TO STREPTOCOCCUS SPECIES: Primary | ICD-10-CM

## 2017-05-19 LAB
ALBUMIN SERPL BCP-MCNC: 3.5 G/DL (ref 3.5–5)
ALBUMIN/GLOB SERPL: 0.7 {RATIO} (ref 1.1–2.2)
ALP SERPL-CCNC: 112 U/L (ref 45–117)
ALT SERPL-CCNC: 30 U/L (ref 12–78)
ANION GAP BLD CALC-SCNC: 10 MMOL/L (ref 5–15)
AST SERPL W P-5'-P-CCNC: 29 U/L (ref 15–37)
BASOPHILS # BLD AUTO: 0 K/UL (ref 0–0.1)
BASOPHILS # BLD: 0 % (ref 0–1)
BILIRUB SERPL-MCNC: 0.8 MG/DL (ref 0.2–1)
BUN SERPL-MCNC: 9 MG/DL (ref 6–20)
BUN/CREAT SERPL: 11 (ref 12–20)
CALCIUM SERPL-MCNC: 9.2 MG/DL (ref 8.5–10.1)
CHLORIDE SERPL-SCNC: 103 MMOL/L (ref 97–108)
CO2 SERPL-SCNC: 27 MMOL/L (ref 21–32)
CREAT SERPL-MCNC: 0.81 MG/DL (ref 0.55–1.02)
DEPRECATED S PYO AG THROAT QL EIA: POSITIVE
EOSINOPHIL # BLD: 0 K/UL (ref 0–0.4)
EOSINOPHIL NFR BLD: 0 % (ref 0–7)
ERYTHROCYTE [DISTWIDTH] IN BLOOD BY AUTOMATED COUNT: 14.2 % (ref 11.5–14.5)
GLOBULIN SER CALC-MCNC: 4.7 G/DL (ref 2–4)
GLUCOSE SERPL-MCNC: 106 MG/DL (ref 65–100)
HCT VFR BLD AUTO: 38.4 % (ref 35–47)
HETEROPH AB SER QL: NEGATIVE
HGB BLD-MCNC: 13.1 G/DL (ref 11.5–16)
LYMPHOCYTES # BLD AUTO: 12 % (ref 12–49)
LYMPHOCYTES # BLD: 1.6 K/UL (ref 0.8–3.5)
MCH RBC QN AUTO: 28.1 PG (ref 26–34)
MCHC RBC AUTO-ENTMCNC: 34.1 G/DL (ref 30–36.5)
MCV RBC AUTO: 82.4 FL (ref 80–99)
MONOCYTES # BLD: 0.8 K/UL (ref 0–1)
MONOCYTES NFR BLD AUTO: 6 % (ref 5–13)
NEUTS SEG # BLD: 10.7 K/UL (ref 1.8–8)
NEUTS SEG NFR BLD AUTO: 82 % (ref 32–75)
PLATELET # BLD AUTO: 283 K/UL (ref 150–400)
POTASSIUM SERPL-SCNC: 3.6 MMOL/L (ref 3.5–5.1)
PROT SERPL-MCNC: 8.2 G/DL (ref 6.4–8.2)
RBC # BLD AUTO: 4.66 M/UL (ref 3.8–5.2)
SODIUM SERPL-SCNC: 140 MMOL/L (ref 136–145)
WBC # BLD AUTO: 13.2 K/UL (ref 3.6–11)

## 2017-05-19 PROCEDURE — 99282 EMERGENCY DEPT VISIT SF MDM: CPT

## 2017-05-19 PROCEDURE — 86308 HETEROPHILE ANTIBODY SCREEN: CPT | Performed by: PHYSICIAN ASSISTANT

## 2017-05-19 PROCEDURE — 96375 TX/PRO/DX INJ NEW DRUG ADDON: CPT

## 2017-05-19 PROCEDURE — 85025 COMPLETE CBC W/AUTO DIFF WBC: CPT | Performed by: PHYSICIAN ASSISTANT

## 2017-05-19 PROCEDURE — 74011636320 HC RX REV CODE- 636/320: Performed by: EMERGENCY MEDICINE

## 2017-05-19 PROCEDURE — 70491 CT SOFT TISSUE NECK W/DYE: CPT

## 2017-05-19 PROCEDURE — 96365 THER/PROPH/DIAG IV INF INIT: CPT

## 2017-05-19 PROCEDURE — 36415 COLL VENOUS BLD VENIPUNCTURE: CPT | Performed by: PHYSICIAN ASSISTANT

## 2017-05-19 PROCEDURE — 74011250636 HC RX REV CODE- 250/636: Performed by: EMERGENCY MEDICINE

## 2017-05-19 PROCEDURE — 74011000258 HC RX REV CODE- 258: Performed by: PHYSICIAN ASSISTANT

## 2017-05-19 PROCEDURE — 80053 COMPREHEN METABOLIC PANEL: CPT | Performed by: PHYSICIAN ASSISTANT

## 2017-05-19 PROCEDURE — 87880 STREP A ASSAY W/OPTIC: CPT | Performed by: PHYSICIAN ASSISTANT

## 2017-05-19 PROCEDURE — 74011250636 HC RX REV CODE- 250/636: Performed by: PHYSICIAN ASSISTANT

## 2017-05-19 RX ORDER — SODIUM CHLORIDE 9 MG/ML
50 INJECTION, SOLUTION INTRAVENOUS
Status: COMPLETED | OUTPATIENT
Start: 2017-05-19 | End: 2017-05-19

## 2017-05-19 RX ORDER — PREDNISONE 5 MG/1
TABLET ORAL
Qty: 21 TAB | Refills: 0 | Status: SHIPPED | OUTPATIENT
Start: 2017-05-19 | End: 2017-07-03

## 2017-05-19 RX ORDER — DIPHENHYDRAMINE HYDROCHLORIDE 50 MG/ML
50 INJECTION, SOLUTION INTRAMUSCULAR; INTRAVENOUS
Status: COMPLETED | OUTPATIENT
Start: 2017-05-19 | End: 2017-05-19

## 2017-05-19 RX ORDER — SODIUM CHLORIDE 0.9 % (FLUSH) 0.9 %
10 SYRINGE (ML) INJECTION
Status: COMPLETED | OUTPATIENT
Start: 2017-05-19 | End: 2017-05-19

## 2017-05-19 RX ORDER — CEFTRIAXONE 1 G/1
INJECTION, POWDER, FOR SOLUTION INTRAMUSCULAR; INTRAVENOUS
Status: DISCONTINUED
Start: 2017-05-19 | End: 2017-05-19 | Stop reason: HOSPADM

## 2017-05-19 RX ORDER — AZITHROMYCIN 250 MG/1
TABLET, FILM COATED ORAL
Qty: 6 TAB | Refills: 0 | Status: SHIPPED | OUTPATIENT
Start: 2017-05-19 | End: 2017-07-03

## 2017-05-19 RX ADMIN — IOPAMIDOL 100 ML: 612 INJECTION, SOLUTION INTRAVENOUS at 09:16

## 2017-05-19 RX ADMIN — METHYLPREDNISOLONE SODIUM SUCCINATE 125 MG: 125 INJECTION, POWDER, FOR SOLUTION INTRAMUSCULAR; INTRAVENOUS at 09:33

## 2017-05-19 RX ADMIN — CEFTRIAXONE 1 G: 1 INJECTION, POWDER, FOR SOLUTION INTRAMUSCULAR; INTRAVENOUS at 09:34

## 2017-05-19 RX ADMIN — Medication 10 ML: at 09:16

## 2017-05-19 RX ADMIN — SODIUM CHLORIDE 50 ML/HR: 900 INJECTION, SOLUTION INTRAVENOUS at 09:16

## 2017-05-19 RX ADMIN — DIPHENHYDRAMINE HYDROCHLORIDE 50 MG: 50 INJECTION, SOLUTION INTRAMUSCULAR; INTRAVENOUS at 09:33

## 2017-05-19 NOTE — ED PROVIDER NOTES
HPI Comments: Tommy Kirkland is a 47 y.o. female with PMhx significant for arthritis, PUD, and sciatica who presents ambulatory to the ED with cc of 10/10 gradually worsening sore throat x 1 day. She also c/o associated fever, voice change, and trouble swallowing. She notes that her voice has been muffled since late last night (5/18/17). The pt reports being evaluated at [facility] ED yesterday (5/18/17) for similar symptoms and was discharged that same day. She states that they did not test for strep throat at that visit. She notes that she was unable to fill her prescriptions from her ED visit yesterday due to insufficient funds. SHx: -tobacco, -EtOH, -illicit drug use    PCP: Nighat Bautista MD    There are no other complaints, changes or physical findings at this time. The history is provided by the patient. No  was used. Past Medical History:   Diagnosis Date    Acute bronchitis Jan 2015    Acute sinusitis     Anxiety     Arthritis     Back pain     Cocaine abuse with cocaine-induced disorder (HCC)     Delivery normal     x4    Dizziness     Gastrointestinal disorder     \"stomach ulcer\"    Other ill-defined conditions     anemia    Palpitations     Peptic ulcer disease     Sciatica     TIA (transient ischemic attack)     Upper respiratory infection        Past Surgical History:   Procedure Laterality Date    ABDOMEN SURGERY PROC UNLISTED      ulcer    HX GYN      tubal ligation    HX HEENT      sinus surgery    HX TONSILLECTOMY      HX TONSILLECTOMY           Family History:   Problem Relation Age of Onset    Stroke Mother     Heart Disease Mother        Social History     Social History    Marital status: SINGLE     Spouse name: N/A    Number of children: N/A    Years of education: N/A     Occupational History    Not on file.      Social History Main Topics    Smoking status: Former Smoker     Packs/day: 0.50     Years: 15.00     Quit date: 4/2/2016  Smokeless tobacco: Never Used    Alcohol use No    Drug use: No    Sexual activity: No     Other Topics Concern    Not on file     Social History Narrative         ALLERGIES: Penicillins and Hydrocodone    Review of Systems   Constitutional: Positive for fever. Negative for activity change, appetite change, chills, fatigue and unexpected weight change. HENT: Positive for sore throat, trouble swallowing and voice change. Negative for congestion, hearing loss, rhinorrhea and sneezing. Eyes: Negative. Negative for pain and visual disturbance. Respiratory: Negative. Negative for apnea, cough, choking, chest tightness and shortness of breath. Cardiovascular: Negative. Negative for chest pain and palpitations. Gastrointestinal: Negative. Negative for abdominal distention, abdominal pain, blood in stool, diarrhea, nausea and vomiting. Genitourinary: Negative. Negative for difficulty urinating, flank pain, frequency and urgency. No discharge   Musculoskeletal: Negative. Negative for arthralgias, back pain, myalgias and neck stiffness. Skin: Negative. Negative for color change and rash. Neurological: Negative. Negative for dizziness, seizures, syncope, speech difficulty, weakness, numbness and headaches. Hematological: Negative for adenopathy. Psychiatric/Behavioral: Negative. Negative for agitation, behavioral problems, dysphoric mood and suicidal ideas. The patient is not nervous/anxious. Patient Vitals for the past 12 hrs:   Temp Pulse Resp BP SpO2   05/19/17 1012 99.9 °F (37.7 °C) 96 18 135/76 99 %   05/19/17 0816 (!) 101 °F (38.3 °C) (!) 114 17 (!) 116/96 100 %            Physical Exam   Constitutional: She is oriented to person, place, and time. She appears well-developed and well-nourished. No distress. HENT:   Head: Normocephalic and atraumatic.    Right Ear: External ear normal.   Left Ear: External ear normal.   Nose: Nose normal.   Mouth/Throat: Oropharynx is clear and moist. Uvula swelling present. No oropharyngeal exudate. Minimal redness to uvula; no exudate. Muffled voice. Eyes: Conjunctivae and EOM are normal. Pupils are equal, round, and reactive to light. Right eye exhibits no discharge. Left eye exhibits no discharge. No scleral icterus. Neck: Normal range of motion. Neck supple. No JVD present. No tracheal deviation present. Cardiovascular: Normal rate, regular rhythm, normal heart sounds and intact distal pulses. Exam reveals no gallop and no friction rub. No murmur heard. Pulmonary/Chest: Effort normal and breath sounds normal. No stridor. No respiratory distress. She has no wheezes. She has no rales. She exhibits no tenderness. Abdominal: Soft. Bowel sounds are normal. She exhibits no distension and no mass. There is no tenderness. There is no rebound and no guarding. Musculoskeletal: Normal range of motion. She exhibits no edema or tenderness. Lymphadenopathy:     She has no cervical adenopathy. Neurological: She is alert and oriented to person, place, and time. She has normal reflexes. No cranial nerve deficit. She exhibits normal muscle tone. Coordination normal.   Skin: Skin is warm and dry. She is not diaphoretic. Psychiatric: She has a normal mood and affect. Her behavior is normal. Judgment and thought content normal.   Nursing note and vitals reviewed. MDM  Number of Diagnoses or Management Options  Diagnosis management comments:   DDx: pharyngitis, uvulitis, URI.        Amount and/or Complexity of Data Reviewed  Clinical lab tests: ordered and reviewed  Review and summarize past medical records: yes    Patient Progress  Patient progress: stable    ED Course       Procedures    LABORATORY TESTS:  Recent Results (from the past 12 hour(s))   STREP AG SCREEN, GROUP A    Collection Time: 05/19/17  8:33 AM   Result Value Ref Range    Group A Strep Ag ID POSITIVE (A) NEG     MONONUCLEOSIS SCREEN    Collection Time: 05/19/17 8:33 AM   Result Value Ref Range    Mononucleosis screen NEGATIVE  NEG     CBC WITH AUTOMATED DIFF    Collection Time: 05/19/17  8:33 AM   Result Value Ref Range    WBC 13.2 (H) 3.6 - 11.0 K/uL    RBC 4.66 3.80 - 5.20 M/uL    HGB 13.1 11.5 - 16.0 g/dL    HCT 38.4 35.0 - 47.0 %    MCV 82.4 80.0 - 99.0 FL    MCH 28.1 26.0 - 34.0 PG    MCHC 34.1 30.0 - 36.5 g/dL    RDW 14.2 11.5 - 14.5 %    PLATELET 757 218 - 001 K/uL    NEUTROPHILS 82 (H) 32 - 75 %    LYMPHOCYTES 12 12 - 49 %    MONOCYTES 6 5 - 13 %    EOSINOPHILS 0 0 - 7 %    BASOPHILS 0 0 - 1 %    ABS. NEUTROPHILS 10.7 (H) 1.8 - 8.0 K/UL    ABS. LYMPHOCYTES 1.6 0.8 - 3.5 K/UL    ABS. MONOCYTES 0.8 0.0 - 1.0 K/UL    ABS. EOSINOPHILS 0.0 0.0 - 0.4 K/UL    ABS. BASOPHILS 0.0 0.0 - 0.1 K/UL   METABOLIC PANEL, COMPREHENSIVE    Collection Time: 05/19/17  8:33 AM   Result Value Ref Range    Sodium 140 136 - 145 mmol/L    Potassium 3.6 3.5 - 5.1 mmol/L    Chloride 103 97 - 108 mmol/L    CO2 27 21 - 32 mmol/L    Anion gap 10 5 - 15 mmol/L    Glucose 106 (H) 65 - 100 mg/dL    BUN 9 6 - 20 MG/DL    Creatinine 0.81 0.55 - 1.02 MG/DL    BUN/Creatinine ratio 11 (L) 12 - 20      GFR est AA >60 >60 ml/min/1.73m2    GFR est non-AA >60 >60 ml/min/1.73m2    Calcium 9.2 8.5 - 10.1 MG/DL    Bilirubin, total 0.8 0.2 - 1.0 MG/DL    ALT (SGPT) 30 12 - 78 U/L    AST (SGOT) 29 15 - 37 U/L    Alk. phosphatase 112 45 - 117 U/L    Protein, total 8.2 6.4 - 8.2 g/dL    Albumin 3.5 3.5 - 5.0 g/dL    Globulin 4.7 (H) 2.0 - 4.0 g/dL    A-G Ratio 0.7 (L) 1.1 - 2.2         IMAGING RESULTS:  CT NECK SOFT TISSUE W CONT   Final Result   EXAM: CT NECK SOFT TISSUE W CONT     INDICATION: swelling and difficult swallowing     COMPARISON: CTA head neck 5/4/2012. .     CONTRAST: 100 mL of Isovue-300.     TECHNIQUE: Multislice helical CT was performed from the mid calvarium to the  aortic arch during uneventful rapid bolus intravenous contrast administration.   Contiguous 2.5 mm axial images were reconstructed and lung and soft tissue  windows were generated. Coronal and sagittal reformations were generated. CT  dose reduction was achieved through use of a standardized protocol tailored for  this examination and automatic exposure control for dose modulation.      FINDINGS:     There is a mass of the lingual tonsils eccentric towards the right which  measures 1.6 x 2.6 cm to 3.02 since into and fills the vallecula. There is  associated prominence of the lingual tonsils with increased enhancing tissue in  the right measuring 11 x 17 mm and 12 x 18 mm on left. Additionally, there is  heterogeneous prominence of the adenoids. On the prior exam of 2012 there was  some similar appearing but much less severe prominence of the lingual tonsils. There is increased number of nonenlarged cervical chain lymph nodes bilaterally. There is otherwise no mass or enlarged adenopathy identified within the neck.      The carotid and jugular vessels enhance normally.      The thyroid gland, submandibular salivary glands and parotid glands are normal.     No nasopharyngeal, pharyngeal or laryngeal mass is identified.     No abnormalities are identified in the visualized portions of the brain or  orbits.      The visualized mastoid air cells and paranasal sinuses are clear.     The visualized portions of the lung apices are clear.     The musculoskeletal structures show degenerative spine change with no aggressive  lesion or acute fracture.     IMPRESSION  IMPRESSION: Marked lingual tonsillar prominence, tonsillar pillar prominence,  and adenoidal prominence in an atypical pattern for neoplasm and likely  reflecting hypertrophy, though consideration of direct visualization and at  least follow-up imaging is recommended to exclude underlying malignancy.        MEDICATIONS GIVEN:  Medications   cefTRIAXone (ROCEPHIN) 1 gram injection (not administered)   cefTRIAXone (ROCEPHIN) 1 g in 0.9% sodium chloride (MBP/ADV) 50 mL (0 g IntraVENous IV Completed 5/19/17 1015)   methylPREDNISolone (PF) (SOLU-MEDROL) injection 125 mg (125 mg IntraVENous Given 5/19/17 0933)   diphenhydrAMINE (BENADRYL) injection 50 mg (50 mg IntraVENous Given 5/19/17 0933)   sodium chloride (NS) flush 10 mL (10 mL IntraVENous Given 5/19/17 0916)   iopamidol (ISOVUE 300) 61 % contrast injection 100 mL (100 mL IntraVENous Given 5/19/17 0916)   0.9% sodium chloride infusion (0 mL/hr IntraVENous IV Completed 5/19/17 1012)       IMPRESSION:  1. Pharyngitis due to Streptococcus species        PLAN:  1. Current Discharge Medication List      START taking these medications    Details   !! azithromycin (ZITHROMAX Z-ESTELITA) 250 mg tablet 2 today then 1 daily for 4 days  Qty: 6 Tab, Refills: 0      predniSONE (STERAPRED) 5 mg dose pack See administration instruction per 5mg dose pack  Qty: 21 Tab, Refills: 0       !! - Potential duplicate medications found. Please discuss with provider. CONTINUE these medications which have NOT CHANGED    Details   !! azithromycin (ZITHROMAX Z-ESTELITA) 250 mg tablet Take as directed  Qty: 6 Tab, Refills: 0       !! - Potential duplicate medications found. Please discuss with provider. 2.   Follow-up Information     Follow up With Details Comments Noe Shipman MD In 2 days As needed [address] [place] [address] [address] [phone]          Return to ED if worse       DISCHARGE NOTE:  10:08 AM  The patient has been re-evaluated and is ready for discharge. Reviewed available results with patient. Counseled patient on diagnosis and care plan. Patient has expressed understanding, and all questions have been answered. Patient agrees with plan and agrees to follow up as recommended, or return to the ED if their symptoms worsen. Discharge instructions have been provided and explained to the patient, along with reasons to return to the ED.       This note is prepared by Moris Sosa, acting as Scribe for [employer], JERRY.    [facility] [employer], JERRY: The scribe's documentation has been prepared under my direction and personally reviewed by me in its entirety. I confirm that the note above accurately reflects all work, treatment, procedures, and medical decision making performed by me.

## 2017-05-19 NOTE — ED NOTES
HAYDEE Garcia reviewed discharge instructions with the patient and samanta  The patient and samanta verbalized understanding. Patient taken out of treatment area via wheelchair by this RN. Daughter states that she will drive patient home.

## 2017-05-19 NOTE — DISCHARGE INSTRUCTIONS
Rapid Strep Test: About This Test  What is it? A rapid strep test checks the bacteria in your throat to see if strep is the cause of your sore throat. Why is this test done? It may be done so your doctor can find out right away whether you have strep throat. There is another test for strep, called a throat culture, but that test takes a few days to get the results. How can you prepare for the test?  You don't need to do anything before you have this test.  What happens during the test?  · You will be asked to tilt your head back and open your mouth as wide as possible. · Your doctor will press your tongue down with a flat stick (tongue depressor) and then examine your mouth and throat. · A clean cotton swab will be rubbed over the back of your throat, around your tonsils, and over any red areas or sores to collect a sample. How long does the test take? · The test takes less than a minute. · Results are available in 10 to 15 minutes. When should you call for help? Call your doctor now or seek immediate medical care if:  · Your pain gets worse on one side of your throat, or you have trouble opening your mouth. · You have a new or higher fever, or you have a fever with a stiff neck or severe headache. · Swallowing becomes harder, or you have any trouble breathing. · You are sensitive to light or feel very sleepy or confused. Watch closely for changes in your health, and be sure to contact your doctor if:  · You do not start to feel better after 2 days. Follow-up care is a key part of your treatment and safety. Be sure to make and go to all appointments, and call your doctor if you are having problems. It's also a good idea to keep a list of the medicines you take. Ask your doctor when you can expect to have your test results. Where can you learn more? Go to http://veronica-janene.info/.   Enter B356 in the search box to learn more about \"Rapid Strep Test: About This Test.\"  Current as of: July 29, 2016  Content Version: 11.2  © 8687-6007 [employer]. Care instructions adapted under license by [employer] (which disclaims liability or warranty for this information). If you have questions about a medical condition or this instruction, always ask your healthcare professional. [address] any warranty or liability for your use of this information. Strep Throat: Care Instructions  Your Care Instructions    Strep throat is a bacterial infection that causes sudden, severe sore throat and fever. Strep throat, which is caused by bacteria called streptococcus, is treated with antibiotics. Sometimes a strep test is necessary to tell if the sore throat is caused by strep bacteria. Treatment can help ease symptoms and may prevent future problems. Follow-up care is a key part of your treatment and safety. Be sure to make and go to all appointments, and call your doctor if you are having problems. It's also a good idea to know your test results and keep a list of the medicines you take. How can you care for yourself at home? · Take your antibiotics as directed. Do not stop taking them just because you feel better. You need to take the full course of antibiotics. · Strep throat can spread to others until 24 hours after you begin taking antibiotics. During this time, you should avoid contact with other people at work or home, especially infants and children. Do not sneeze or cough on others, and wash your hands often. Keep your drinking glass and eating utensils separate from those of others, and wash these items well in hot, soapy water. · Gargle with warm salt water at least once each hour to help reduce swelling and make your throat feel better. Use 1 teaspoon of salt mixed in 8 fluid ounces of warm water. · Take an over-the-counter pain medication, such as acetaminophen (Tylenol), ibuprofen (Advil, Motrin), or naproxen (Aleve). Read and follow all instructions on the label. · Try an over-the-counter anesthetic throat spray or throat lozenges, which may help relieve throat pain. · Drink plenty of fluids. Fluids may help soothe an irritated throat. Hot fluids, such as tea or soup, may help your throat feel better. · Eat soft solids and drink plenty of clear liquids. Flavored ice pops, ice cream, scrambled eggs, sherbet, and gelatin dessert (such as Jell-O) may also soothe the throat. · Get lots of rest.  · Do not smoke, and avoid secondhand smoke. If you need help quitting, talk to your doctor about stop-smoking programs and medicines. These can increase your chances of quitting for good. · Use a vaporizer or humidifier to add moisture to the air in your bedroom. Follow the directions for cleaning the machine. When should you call for help? Call your doctor now or seek immediate medical care if:  · You have a new or higher fever. · You have a fever with a stiff neck or severe headache. · You have new or worse trouble swallowing. · Your sore throat gets much worse on one side. · Your pain becomes much worse on one side of your throat. Watch closely for changes in your health, and be sure to contact your doctor if:  · You are not getting better after 2 days (48 hours). · You do not get better as expected. Where can you learn more? Go to http://veronica-janene.info/. Enter K625 in the search box to learn more about \"Strep Throat: Care Instructions. \"  Current as of: July 29, 2016  Content Version: 11.2  © 4637-7889 [employer] Care instructions adapted under license by [employer] (which disclaims liability or warranty for this information). If you have questions about a medical condition or this instruction, always ask your healthcare professional. [address] any warranty or liability for your use of this information.

## 2017-07-03 ENCOUNTER — APPOINTMENT (OUTPATIENT)
Dept: GENERAL RADIOLOGY | Age: 52
End: 2017-07-03
Attending: PHYSICIAN ASSISTANT
Payer: SELF-PAY

## 2017-07-03 ENCOUNTER — HOSPITAL ENCOUNTER (EMERGENCY)
Age: 52
Discharge: HOME | End: 2017-07-03
Attending: EMERGENCY MEDICINE
Payer: SELF-PAY

## 2017-07-03 VITALS
RESPIRATION RATE: 16 BRPM | WEIGHT: 263.45 LBS | HEIGHT: 72 IN | BODY MASS INDEX: 35.68 KG/M2 | TEMPERATURE: 98.7 F | OXYGEN SATURATION: 96 % | SYSTOLIC BLOOD PRESSURE: 123 MMHG | HEART RATE: 66 BPM | DIASTOLIC BLOOD PRESSURE: 60 MMHG

## 2017-07-03 DIAGNOSIS — M79.672 LEFT FOOT PAIN: Primary | ICD-10-CM

## 2017-07-03 DIAGNOSIS — R03.0 ELEVATED BLOOD PRESSURE READING: ICD-10-CM

## 2017-07-03 DIAGNOSIS — M77.32 HEEL SPUR, LEFT: ICD-10-CM

## 2017-07-03 PROCEDURE — 73630 X-RAY EXAM OF FOOT: CPT

## 2017-07-03 PROCEDURE — 99283 EMERGENCY DEPT VISIT LOW MDM: CPT

## 2017-07-03 PROCEDURE — 74011250637 HC RX REV CODE- 250/637: Performed by: PHYSICIAN ASSISTANT

## 2017-07-03 RX ORDER — NAPROXEN 500 MG/1
500 TABLET ORAL
Qty: 20 TAB | Refills: 0 | Status: SHIPPED | OUTPATIENT
Start: 2017-07-03 | End: 2017-08-03

## 2017-07-03 RX ORDER — OXYCODONE AND ACETAMINOPHEN 5; 325 MG/1; MG/1
.5-1 TABLET ORAL
Qty: 18 TAB | Refills: 0 | Status: SHIPPED | OUTPATIENT
Start: 2017-07-03 | End: 2017-08-03

## 2017-07-03 RX ORDER — OXYCODONE AND ACETAMINOPHEN 5; 325 MG/1; MG/1
1 TABLET ORAL
Status: COMPLETED | OUTPATIENT
Start: 2017-07-03 | End: 2017-07-03

## 2017-07-03 RX ADMIN — OXYCODONE HYDROCHLORIDE AND ACETAMINOPHEN 1 TABLET: 5; 325 TABLET ORAL at 18:16

## 2017-07-03 NOTE — ED NOTES
Pt reporting has been having left heal pain for past month. Started out with her left heal hurting, and would hurt until she would start to walk on it more throughout the day. Once up and moving about more pain would subside. Pain has then worsened over the past couple days. Currently is experiencing 10/10 pain which worsens with movement. Denies recent injuries, is concerned may be due to arthritis.

## 2017-07-03 NOTE — ROUTINE PROCESS
Kavin HUBBARD reviewed discharge instructions with the patient. The patient verbalized understanding. Assisted to waiting room via wheelchair by RN, alert and stable for discharge.

## 2017-07-03 NOTE — DISCHARGE INSTRUCTIONS
Heel Pain: Care Instructions  Your Care Instructions  You can have heel pain from an injury or from everyday overuse, such as running or walking a lot. Plantar fasciitis is the most common cause of heel pain. In this condition, the bottom of your foot from the front of the heel to the base of the toes is sore and hard to walk on. Your heel can get better with rest, anti-inflammatory pain medicines, and stretching exercises. Follow-up care is a key part of your treatment and safety. Be sure to make and go to all appointments, and call your doctor if you are having problems. Its also a good idea to know your test results and keep a list of the medicines you take. How can you care for yourself at home? · Rest your feet often. Reduce your activity to a level that lets you avoid pain. If possible, do not run or walk on hard surfaces. · Take anti-inflammatory medicines to reduce heel pain. These include ibuprofen (Advil, Motrin) and naproxen (Aleve). Read and follow all instructions on the label. · Put ice or a cold pack on your heel for 10 to 20 minutes at a time. Try to do this every 1 to 2 hours for the next 3 days (when you are awake). Put a thin cloth between the ice and your skin. · If ice isn't helping after 2 or 3 days, try heat, such as a heating pad set on low. · If your doctor says it is okay, try these calf stretches. Tight calf muscles can cause heel pain or make it worse. ¨ Stand about 1 foot from a wall. Place the palms of both hands against the wall at chest level and lean forward against the wall. Put the leg you want to stretch about a step behind your other leg. Keep your back heel on the floor and bend your front knee until you feel a stretch in the back leg. Hold this position for 15 to 30 seconds. Repeat the exercise 2 to 4 times a session. Do 3 to 4 sessions a day. ¨ Sit down on the floor or a mat with your feet stretched in front of you.  Roll up a towel lengthwise, and loop it over the ball of your foot. Holding the towel at both ends, gently pull the towel toward you to stretch your foot. Hold this position for 15 to 30 seconds. Repeat the exercise 2 to 4 times a session. Do 3 to 4 sessions a day. · Wear a night splint if your doctor suggests it. A night splint holds your foot with the toes pointed up. This position gives the bottom of your foot a constant, gentle stretch. · Wear shoes with good arch support. Athletic shoes or shoes with a well-cushioned sole are good choices. · Try a heel lift, heel cup or shoe insert (orthotic) to help cushion your heel. You can buy these at many shoe stores. Use them in both shoes, even if only one foot hurts. · Maintain a healthy weight. This puts less strain on your feet. When should you call for help? Call your doctor now or seek immediate medical care if:  · You have heel pain with fever, redness, or warmth in your heel. · You have numbness or tingling in your heel. Watch closely for changes in your health, and be sure to contact your doctor if:  · You cannot put weight on the sore foot. · Your heel pain lasts more than 2 weeks. Where can you learn more? Go to http://veronica-janene.info/. Enter S299 in the search box to learn more about \"Heel Pain: Care Instructions. \"  Current as of: March 20, 2017  Content Version: 11.3  © 9028-7068 [employer]. Care instructions adapted under license by [employer] (which disclaims liability or warranty for this information). If you have questions about a medical condition or this instruction, always ask your healthcare professional. [place] [address] any warranty or liability for your use of this information. Elevated Blood Pressure: Care Instructions  Your Care Instructions    Blood pressure is a measure of how hard the blood pushes against the walls of your arteries. It's normal for blood pressure to go up and down throughout the day. But if it stays up over time, you have high blood pressure. Two numbers tell you your blood pressure. The first number is the systolic pressure. It shows how hard the blood pushes when your heart is pumping. The second number is the diastolic pressure. It shows how hard the blood pushes between heartbeats, when your heart is relaxed and filling with blood. An ideal blood pressure in adults is less than 120/80 (say \"120 over 80\"). High blood pressure is 140/90 or higher. You have high blood pressure if your top number is 140 or higher or your bottom number is 90 or higher, or both. The main test for high blood pressure is simple, fast, and painless. To diagnose high blood pressure, your doctor will test your blood pressure at different times. After testing your blood pressure, your doctor may ask you to test it again when you are home. If you are diagnosed with high blood pressure, you can work with your doctor to make a long-term plan to manage it. Follow-up care is a key part of your treatment and safety. Be sure to make and go to all appointments, and call your doctor if you are having problems. It's also a good idea to know your test results and keep a list of the medicines you take. How can you care for yourself at home? · Do not smoke. Smoking increases your risk for heart attack and stroke. If you need help quitting, talk to your doctor about stop-smoking programs and medicines. These can increase your chances of quitting for good. · Stay at a healthy weight. · Try to limit how much sodium you eat to less than 2,300 milligrams (mg) a day. Your doctor may ask you to try to eat less than 1,500 mg a day. · Be physically active. Get at least 30 minutes of exercise on most days of the week. Walking is a good choice. You also may want to do other activities, such as running, swimming, cycling, or playing tennis or team sports. · Avoid or limit alcohol.  Talk to your doctor about whether you can drink any alcohol. · Eat plenty of fruits, vegetables, and low-fat dairy products. Eat less saturated and total fats. · Learn how to check your blood pressure at home. When should you call for help? Call your doctor now or seek immediate medical care if:  · Your blood pressure is much higher than normal (such as 180/110 or higher). · You think high blood pressure is causing symptoms such as:  ¨ Severe headache. ¨ Blurry vision. Watch closely for changes in your health, and be sure to contact your doctor if:  · You do not get better as expected. Where can you learn more? Go to http://veronica-janene.info/. Enter R957 in the search box to learn more about \"Elevated Blood Pressure: Care Instructions. \"  Current as of: April 3, 2017  Content Version: 11.3  © 0373-4667 [employer]. Care instructions adapted under license by [employer] (which disclaims liability or warranty for this information). If you have questions about a medical condition or this instruction, always ask your healthcare professional. [place] [address] any warranty or liability for your use of this information.

## 2017-07-03 NOTE — ED PROVIDER NOTES
HPI Comments: Anabelle Rodriguez, 47 y.o. Female with PMHx of arthritis, TIA, presents ambulatory to [facility] ED with cc of left heel pain that was intermittent but is now constant. Patient states that the pain originally began 1 month ago. She notes that the pain increases upon ambulating and upon bending or flexing her foot. She states that she has tried epsom salt baths, and OTC pain medications with no relief in her sx. She denies any other pain. PCP: Vilma Acevedo MD    Social history significant for: - Tobacco (former), - EtOH, - Illicit Drug Use    There are no other complaints, changes, or physical findings at this time. The history is provided by the patient. No  was used. Past Medical History:   Diagnosis Date    Acute bronchitis Jan 2015    Acute sinusitis     Anxiety     Arthritis     Back pain     Cocaine abuse with cocaine-induced disorder (HCC)     Delivery normal     x4    Dizziness     Gastrointestinal disorder     \"stomach ulcer\"    Other ill-defined conditions     anemia    Palpitations     Peptic ulcer disease     Sciatica     TIA (transient ischemic attack)     Upper respiratory infection        Past Surgical History:   Procedure Laterality Date    ABDOMEN SURGERY PROC UNLISTED      ulcer    HX GYN      tubal ligation    HX HEENT      sinus surgery    HX TONSILLECTOMY      HX TONSILLECTOMY           Family History:   Problem Relation Age of Onset    Stroke Mother     Heart Disease Mother        Social History     Social History    Marital status: SINGLE     Spouse name: N/A    Number of children: N/A    Years of education: N/A     Occupational History    Not on file.      Social History Main Topics    Smoking status: Former Smoker     Packs/day: 0.50     Years: 15.00     Quit date: 4/2/2016    Smokeless tobacco: Never Used    Alcohol use No    Drug use: No    Sexual activity: No     Other Topics Concern    Not on file     Social History Narrative         ALLERGIES: Penicillins and Hydrocodone    Review of Systems   Constitutional: Negative. Negative for fever. HENT: Negative. Eyes: Negative. Respiratory: Negative. Cardiovascular: Negative. Gastrointestinal: Negative. Negative for constipation, diarrhea, nausea and vomiting. Denies liver disease   Endocrine:        Denies thyroid disease   Genitourinary: Negative. Negative for dysuria. Denies kidney disease   Musculoskeletal: Positive for arthralgias (left foot). Skin: Negative. Neurological: Negative. All other systems reviewed and are negative. Patient Vitals for the past 12 hrs:   Temp Pulse Resp BP SpO2   07/03/17 1832 - 66 16 123/60 -   07/03/17 1823 - 73 18 (!) 148/101 96 %   07/03/17 1737 98.7 °F (37.1 °C) 77 16 (!) 155/100 99 %       Physical Exam   Constitutional: She is oriented to person, place, and time. She appears well-developed and well-nourished. No distress. HENT:   Head: Normocephalic and atraumatic. Right Ear: External ear normal.   Left Ear: External ear normal.   Nose: Nose normal.   Mouth/Throat: Oropharynx is clear and moist. No oropharyngeal exudate. Eyes: Conjunctivae and EOM are normal. Pupils are equal, round, and reactive to light. Right eye exhibits no discharge. Left eye exhibits no discharge. No scleral icterus. Neck: Normal range of motion. Neck supple. No tracheal deviation present. Cardiovascular: Normal rate, regular rhythm, normal heart sounds and intact distal pulses. Exam reveals no gallop and no friction rub. No murmur heard. Pulmonary/Chest: Effort normal and breath sounds normal. No respiratory distress. She has no wheezes. She has no rales. She exhibits no tenderness. Abdominal: Soft. Bowel sounds are normal. She exhibits no distension and no mass. There is no tenderness. There is no rebound and no guarding.    Musculoskeletal:   + Tenderness to inferior posterior left hind foot  No contusions, no erythema  Patient is ambulating normally  Flexion and extension intact   Lymphadenopathy:     She has no cervical adenopathy. Neurological: She is alert and oriented to person, place, and time. No cranial nerve deficit. Neurovascularly intact   Skin: Skin is warm and dry. No rash noted. No erythema. Psychiatric: She has a normal mood and affect. Her behavior is normal.   Nursing note and vitals reviewed. MDM  Number of Diagnoses or Management Options  Elevated blood pressure reading:   Heel spur, left:   Left foot pain:   Diagnosis management comments: DDx: heel spur, plantar fasciitis, arthritis, strain, sprain       Amount and/or Complexity of Data Reviewed  Tests in the radiology section of CPT®: ordered and reviewed  Review and summarize past medical records: yes  Independent visualization of images, tracings, or specimens: yes    Patient Progress  Patient progress: stable    ED Course       Procedures      LABORATORY TESTS:  No results found for this or any previous visit (from the past 12 hour(s)). IMAGING RESULTS:  XR FOOT LT MIN 3 V   Final Result   INDICATION: Left foot pain for 2 weeks. Worsening and worsening.     Exam: AP, lateral, oblique views of the left foot.     FINDINGS: There is no acute fracture or dislocation. Plantar and posterior  calcaneal spurring is noted. Bones are well-mineralized. Soft tissues are  normal. No radiodense foreign body is seen.     IMPRESSION  IMPRESSION: No acute fracture or dislocation. MEDICATIONS GIVEN:  Medications   oxyCODONE-acetaminophen (PERCOCET) 5-325 mg per tablet 1 Tab (1 Tab Oral Given 7/3/17 1816)       IMPRESSION:  1. Left foot pain    2. Heel spur, left    3. Elevated blood pressure reading        PLAN:  1. Discharge   Current Discharge Medication List      START taking these medications    Details   naproxen (NAPROSYN) 500 mg tablet Take 1 Tab by mouth every twelve (12) hours as needed for Pain.   Qty: 20 Tab, Refills: 0 oxyCODONE-acetaminophen (PERCOCET) 5-325 mg per tablet Take 0.5-1 Tabs by mouth every eight (8) hours as needed for Pain. Max Daily Amount: 3 Tabs. Qty: 18 Tab, Refills: 0           2. Follow-up Information     Follow up With Details Comments Contact Info    Huong Elena DPM Schedule an appointment as soon as possible for a visit foot specialist - [address] [place] [address]  [phone]      [facility] EMERGENCY DEPT  If symptoms worsen [address] [place] [address]  [phone]        Return to ED if worse       Discharge Note:  6:30 PM  The pt is ready for discharge. The pt's signs, symptoms, diagnosis, and discharge instructions have been discussed and pt has conveyed their understanding. The pt is to follow up as recommended or return to ER should their symptoms worsen. Plan has been discussed and pt is in agreement. This note is prepared by Aliya Sifuentes, acting as a Scribe for [employer]. Bk Ray PA-C: The scribe's documentation has been prepared under my direction and personally reviewed by me in its entirety. I confirm that the notes above accurately reflects all work, treatment, procedures, and medical decision making performed by me.

## 2017-08-03 ENCOUNTER — APPOINTMENT (OUTPATIENT)
Dept: GENERAL RADIOLOGY | Age: 52
End: 2017-08-03
Attending: PHYSICIAN ASSISTANT
Payer: SELF-PAY

## 2017-08-03 ENCOUNTER — HOSPITAL ENCOUNTER (EMERGENCY)
Age: 52
Discharge: HOME | End: 2017-08-03
Attending: EMERGENCY MEDICINE
Payer: SELF-PAY

## 2017-08-03 VITALS
RESPIRATION RATE: 18 BRPM | HEART RATE: 72 BPM | BODY MASS INDEX: 35.86 KG/M2 | HEIGHT: 72 IN | WEIGHT: 264.77 LBS | TEMPERATURE: 98.2 F | OXYGEN SATURATION: 96 % | DIASTOLIC BLOOD PRESSURE: 94 MMHG | SYSTOLIC BLOOD PRESSURE: 154 MMHG

## 2017-08-03 DIAGNOSIS — J02.9 SORE THROAT: ICD-10-CM

## 2017-08-03 DIAGNOSIS — J06.9 ACUTE UPPER RESPIRATORY INFECTION: Primary | ICD-10-CM

## 2017-08-03 LAB — DEPRECATED S PYO AG THROAT QL EIA: NEGATIVE

## 2017-08-03 PROCEDURE — 71020 XR CHEST PA LAT: CPT

## 2017-08-03 PROCEDURE — 87880 STREP A ASSAY W/OPTIC: CPT | Performed by: PHYSICIAN ASSISTANT

## 2017-08-03 PROCEDURE — 99283 EMERGENCY DEPT VISIT LOW MDM: CPT

## 2017-08-03 PROCEDURE — 87147 CULTURE TYPE IMMUNOLOGIC: CPT | Performed by: EMERGENCY MEDICINE

## 2017-08-03 PROCEDURE — 87070 CULTURE OTHR SPECIMN AEROBIC: CPT | Performed by: EMERGENCY MEDICINE

## 2017-08-03 PROCEDURE — 74011250637 HC RX REV CODE- 250/637: Performed by: PHYSICIAN ASSISTANT

## 2017-08-03 RX ORDER — BENZONATATE 100 MG/1
100 CAPSULE ORAL
Qty: 30 CAP | Refills: 0 | Status: SHIPPED | OUTPATIENT
Start: 2017-08-03 | End: 2017-08-10

## 2017-08-03 RX ORDER — GUAIFENESIN 100 MG/5ML
200 SOLUTION ORAL
Status: COMPLETED | OUTPATIENT
Start: 2017-08-03 | End: 2017-08-03

## 2017-08-03 RX ORDER — AZITHROMYCIN 250 MG/1
TABLET, FILM COATED ORAL
Qty: 6 TAB | Refills: 0 | Status: SHIPPED | OUTPATIENT
Start: 2017-08-03 | End: 2017-08-08

## 2017-08-03 RX ADMIN — GUAIFENESIN 200 MG: 100 SOLUTION ORAL at 18:31

## 2017-08-03 NOTE — ED TRIAGE NOTES
Patient arrives ambulatory to ED with daughter and granddaughter with complaint sore throat, cough, chest congestion, nausea since Monday. Patient denies V/D, shortness of breath and chest pain.

## 2017-08-03 NOTE — ED PROVIDER NOTES
HPI Comments: Vicente Carrillo is a 47 y.o. female with PMhx significant for anemia, anxiety, and cocaine abuse who presents ambulatory to the ED with cc of progressively worsening sore throat and productive cough which started 3 days ago. Pt also c/o congestion, chills, and chest pain secondary to her cough. She notes that her cough produces green sputum. 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 She is allergic to Penicillin and Hydrocodone. She denies shortness of breath, back pain, abdominal pain, V/D, and fever. Social Hx: - Tobacco, - EtOH, - Illicit Drugs    PCP: Caroline Padgett MD    There are no other complaints, changes or physical findings at this time. The history is provided by the patient. No  was used. Past Medical History:   Diagnosis Date    Acute bronchitis Jan 2015    Acute sinusitis     Anxiety     Arthritis     Back pain     Cocaine abuse with cocaine-induced disorder (HCC)     Delivery normal     x4    Dizziness     Gastrointestinal disorder     \"stomach ulcer\"    Other ill-defined conditions     anemia    Palpitations     Peptic ulcer disease     Sciatica     TIA (transient ischemic attack)     Upper respiratory infection        Past Surgical History:   Procedure Laterality Date    ABDOMEN SURGERY PROC UNLISTED      ulcer    HX GYN      tubal ligation    HX HEENT      sinus surgery    HX TONSILLECTOMY      HX TONSILLECTOMY           Family History:   Problem Relation Age of Onset    Stroke Mother     Heart Disease Mother        Social History     Social History    Marital status: SINGLE     Spouse name: N/A    Number of children: N/A    Years of education: N/A     Occupational History    Not on file.      Social History Main Topics    Smoking status: Former Smoker     Packs/day: 0.50     Years: 15.00     Quit date: 4/2/2016    Smokeless tobacco: Never Used    Alcohol use No    Drug use: No    Sexual activity: No     Other Topics Concern    Not on file     Social History Narrative         ALLERGIES: Penicillins and Hydrocodone    Review of Systems   Constitutional: Positive for chills. Negative for fever. HENT: Positive for congestion and sore throat. Negative for rhinorrhea. Eyes: Negative. Negative for visual disturbance. Respiratory: Positive for cough (productive). Negative for chest tightness, shortness of breath and wheezing. Cardiovascular: Positive for chest pain (only with cough ). Negative for palpitations. Gastrointestinal: Negative. Negative for abdominal pain, constipation, diarrhea, nausea and vomiting. Genitourinary: Negative. Negative for dysuria and hematuria. Musculoskeletal: Negative. Negative for arthralgias, back pain and myalgias. Skin: Negative. Negative for rash. Allergic/Immunologic: Negative. Negative for environmental allergies and food allergies. Neurological: Negative. Negative for headaches. Psychiatric/Behavioral: Negative. Negative for suicidal ideas. Vitals:    08/03/17 1740   BP: (!) 168/101   Pulse: 82   Resp: 18   Temp: 98.2 °F (36.8 °C)   SpO2: 98%   Weight: 120.1 kg (264 lb 12.4 oz)   Height: 6' (1.829 m)            Physical Exam   Constitutional: She is oriented to person, place, and time. She appears well-developed. No distress. Pt is an AAF, awake and alert in NAD. Elevated BMI   HENT:   Head: Normocephalic and atraumatic. Right Ear: Tympanic membrane, external ear and ear canal normal.   Left Ear: Tympanic membrane, external ear and ear canal normal.   Nose: Nose normal.   Mouth/Throat: Uvula is midline and mucous membranes are normal. Posterior oropharyngeal erythema present. No oropharyngeal exudate or posterior oropharyngeal edema. Pt tolerating secretions, no muffled voice sounds. Eyes: Conjunctivae and EOM are normal. Pupils are equal, round, and reactive to light. Right eye exhibits no discharge.  Left eye exhibits no discharge. Neck: Normal range of motion. Cardiovascular: Normal rate, normal heart sounds and intact distal pulses. Pulmonary/Chest: Effort normal and breath sounds normal. No respiratory distress. She has no wheezes. She has no rales. She exhibits no tenderness. Abdominal: Soft. Bowel sounds are normal. There is no tenderness. There is no guarding. No CVA tenderness b/l. Musculoskeletal: Normal range of motion. She exhibits no edema or tenderness. Lymphadenopathy:     She has no cervical adenopathy. Neurological: She is alert and oriented to person, place, and time. Coordination normal.   No focal neuro deficits. Skin: Skin is warm and dry. No rash noted. She is not diaphoretic. No erythema. No pallor. Psychiatric: She has a normal mood and affect. Her behavior is normal.   Vitals reviewed. MDM  Number of Diagnoses or Management Options  Acute upper respiratory infection:   Sore throat:   Diagnosis management comments:   DDx: URI, strep pharyngitis, pneumonia, bronchitis       Amount and/or Complexity of Data Reviewed  Clinical lab tests: ordered and reviewed  Tests in the radiology section of CPT®: ordered and reviewed  Review and summarize past medical records: yes  Independent visualization of images, tracings, or specimens: yes    Patient Progress  Patient progress: stable    ED Course       Procedures    LABORATORY TESTS:  Recent Results (from the past 12 hour(s))   STREP AG SCREEN, GROUP A    Collection Time: 08/03/17  5:46 PM   Result Value Ref Range    Group A Strep Ag ID NEGATIVE  NEG         IMAGING RESULTS:  XR CHEST PA LAT   Final Result   EXAM:  XR CHEST PA LAT     INDICATION:   cough     COMPARISON: 1/13/2017.     FINDINGS: PA and lateral radiographs of the chest demonstrate linear density at  the left lung base laterally consistent with atelectasis. Right lung is clear. .  The cardiac and mediastinal contours and pulmonary vascularity are normal.  The  bones and soft tissues are within normal limits.      IMPRESSION  IMPRESSION: Atelectasis left lung base. MEDICATIONS GIVEN:  Medications   guaiFENesin (ROBITUSSIN) 100 mg/5 mL oral liquid 200 mg (200 mg Oral Given 8/3/17 1831)       IMPRESSION:  1. Acute upper respiratory infection    2. Sore throat        PLAN:  1. Current Discharge Medication List      START taking these medications    Details   azithromycin (ZITHROMAX Z-ESTELITA) 250 mg tablet SIG: Take 2 tabs today; then take 1 tab daily for 4 days  Qty: 6 Tab, Refills: 0      benzonatate (TESSALON PERLES) 100 mg capsule Take 1 Cap by mouth three (3) times daily as needed for Cough for up to 7 days. Qty: 30 Cap, Refills: 0           2. Follow-up Information     Follow up With Details Comments [address], [place] Schedule an appointment as soon as possible for a visit in 2 days  [facility] [address] [place] [address]  [phone]      [facility] EMERGENCY DEPT  As needed or, If symptoms worsen [address] [place] [address]  [phone]        Return to ED if worse   DISCHARGE NOTE  6:28 PM  The patient has been re-evaluated and is ready for discharge. Reviewed available results with patient. Counseled patient on diagnosis and care plan. Patient has expressed understanding, and all questions have been answered. Patient agrees with plan and agrees to follow up as recommended, or return to the ED if their symptoms worsen. Discharge instructions have been provided and explained to the patient, along with reasons to return to the ED. Attestation Note:  This note is prepared by [facility], acting as Scribe for JERRY Abraham PA-C: The scribe's documentation has been prepared under my direction and personally reviewed by me in its entirety. I confirm that the note above accurately reflects all work, treatment, procedures, and medical decision making performed by me.         This note will not be viewable in MyChart.

## 2017-08-03 NOTE — DISCHARGE INSTRUCTIONS
Sore Throat: Care Instructions  Your Care Instructions    Infection by bacteria or a virus causes most sore throats. Cigarette smoke, dry air, air pollution, allergies, and yelling can also cause a sore throat. Sore throats can be painful and annoying. Fortunately, most sore throats go away on their own. If you have a bacterial infection, your doctor may prescribe antibiotics. Follow-up care is a key part of your treatment and safety. Be sure to make and go to all appointments, and call your doctor if you are having problems. It's also a good idea to know your test results and keep a list of the medicines you take. How can you care for yourself at home? · If your doctor prescribed antibiotics, take them as directed. Do not stop taking them just because you feel better. You need to take the full course of antibiotics. · Gargle with warm salt water once an hour to help reduce swelling and relieve discomfort. Use 1 teaspoon of salt mixed in 1 cup of warm water. · Take an over-the-counter pain medicine, such as acetaminophen (Tylenol), ibuprofen (Advil, Motrin), or naproxen (Aleve). Read and follow all instructions on the label. · Be careful when taking over-the-counter cold or flu medicines and Tylenol at the same time. Many of these medicines have acetaminophen, which is Tylenol. Read the labels to make sure that you are not taking more than the recommended dose. Too much acetaminophen (Tylenol) can be harmful. · Drink plenty of fluids. Fluids may help soothe an irritated throat. Hot fluids, such as tea or soup, may help decrease throat pain. · Use over-the-counter throat lozenges to soothe pain. Regular cough drops or hard candy may also help. These should not be given to young children because of the risk of choking. · Do not smoke or allow others to smoke around you. If you need help quitting, talk to your doctor about stop-smoking programs and medicines.  These can increase your chances of quitting for good. · Use a vaporizer or humidifier to add moisture to your bedroom. Follow the directions for cleaning the machine. When should you call for help? Call your doctor now or seek immediate medical care if:  · You have new or worse trouble swallowing. · Your sore throat gets much worse on one side. Watch closely for changes in your health, and be sure to contact your doctor if you do not get better as expected. Where can you learn more? Go to http://veronica-janene.info/. Enter 062 [id number] 80 [id number] in the search box to learn more about \"Sore Throat: Care Instructions. \"  Current as of: July 29, 2016  Content Version: 11.3  © 4330-8128 [employer]. Care instructions adapted under license by [employer] (which disclaims liability or warranty for this information). If you have questions about a medical condition or this instruction, always ask your healthcare professional. [place] [address] any warranty or liability for your use of this information. Cough: Care Instructions  Your Care Instructions  A cough is your body's response to something that bothers your throat or airways. Many things can cause a cough. You might cough because of a cold or the flu, bronchitis, or asthma. Smoking, postnasal drip, allergies, and stomach acid that backs up into your throat also can cause coughs. A cough is a symptom, not a disease. Most coughs stop when the cause, such as a cold, goes away. You can take a few steps at home to cough less and feel better. Follow-up care is a key part of your treatment and safety. Be sure to make and go to all appointments, and call your doctor if you are having problems. It's also a good idea to know your test results and keep a list of the medicines you take. How can you care for yourself at home? · Drink lots of water and other fluids. This helps thin the mucus and soothes a dry or sore throat.  Honey or lemon juice in hot water or tea may ease a dry cough. · Take cough medicine as directed by your doctor. · Prop up your head on pillows to help you breathe and ease a dry cough. · Try cough drops to soothe a dry or sore throat. Cough drops don't stop a cough. Medicine-flavored cough drops are no better than candy-flavored drops or hard candy. · Do not smoke. Avoid secondhand smoke. If you need help quitting, talk to your doctor about stop-smoking programs and medicines. These can increase your chances of quitting for good. When should you call for help? Call 911 anytime you think you may need emergency care. For example, call if:  · You have severe trouble breathing. Call your doctor now or seek immediate medical care if:  · You cough up blood. · You have new or worse trouble breathing. · You have a new or higher fever. · You have a new rash. Watch closely for changes in your health, and be sure to contact your doctor if:  · You cough more deeply or more often, especially if you notice more mucus or a change in the color of your mucus. · You have new symptoms, such as a sore throat, an earache, or sinus pain. · You do not get better as expected. Where can you learn more? Go to http://veronica-janene.info/. Enter D279 in the search box to learn more about \"Cough: Care Instructions. \"  Current as of: March 25, 2017  Content Version: 11.3  © 2792-2343 [employer]. Care instructions adapted under license by [employer] (which disclaims liability or warranty for this information). If you have questions about a medical condition or this instruction, always ask your healthcare professional. [place] [address] any warranty or liability for your use of this information.

## 2017-08-03 NOTE — LETTER
[address] 
[facility] EMERGENCY DEPT 
[address] [place] [address] [facility] [id number] [phone] Work/School Note Date: 8/3/2017 To Whom It May concern: 
 
Jm Sinclair was seen and treated today in the emergency room by the following provider(s): 
Attending Provider: Chidi Feliciano MD 
Physician Assistant: GREGOR Galindo Jm Sinclair may return to work in 1-2 days, earlier if feeling better. Sincerely, Vilma Galindo

## 2017-08-03 NOTE — ED NOTES
HAYDEE Guerrero at bedside to provide discharge paperwork. Vital signs stable. Pt in no apparent distress at this time. Mental status at baseline. Ambulatory to waiting room with steady gate, discharge paperwork in hand. Accompanied by daughter and granddaughter.

## 2017-08-05 LAB
BACTERIA SPEC CULT: ABNORMAL
BACTERIA SPEC CULT: ABNORMAL
SERVICE CMNT-IMP: ABNORMAL

## 2017-11-28 ENCOUNTER — APPOINTMENT (OUTPATIENT)
Dept: GENERAL RADIOLOGY | Age: 52
End: 2017-11-28
Attending: PHYSICIAN ASSISTANT
Payer: SELF-PAY

## 2017-11-28 ENCOUNTER — HOSPITAL ENCOUNTER (EMERGENCY)
Age: 52
Discharge: HOME | End: 2017-11-28
Attending: EMERGENCY MEDICINE
Payer: SELF-PAY

## 2017-11-28 VITALS
SYSTOLIC BLOOD PRESSURE: 153 MMHG | HEART RATE: 76 BPM | RESPIRATION RATE: 16 BRPM | HEIGHT: 72 IN | DIASTOLIC BLOOD PRESSURE: 96 MMHG | OXYGEN SATURATION: 100 % | TEMPERATURE: 98.3 F | BODY MASS INDEX: 36.49 KG/M2 | WEIGHT: 269.4 LBS

## 2017-11-28 DIAGNOSIS — G43.909 MIGRAINE WITHOUT STATUS MIGRAINOSUS, NOT INTRACTABLE, UNSPECIFIED MIGRAINE TYPE: ICD-10-CM

## 2017-11-28 DIAGNOSIS — J06.9 VIRAL URI WITH COUGH: Primary | ICD-10-CM

## 2017-11-28 LAB — DEPRECATED S PYO AG THROAT QL EIA: NEGATIVE

## 2017-11-28 PROCEDURE — 87070 CULTURE OTHR SPECIMN AEROBIC: CPT | Performed by: EMERGENCY MEDICINE

## 2017-11-28 PROCEDURE — 99283 EMERGENCY DEPT VISIT LOW MDM: CPT

## 2017-11-28 PROCEDURE — 87880 STREP A ASSAY W/OPTIC: CPT

## 2017-11-28 PROCEDURE — 74011250637 HC RX REV CODE- 250/637: Performed by: PHYSICIAN ASSISTANT

## 2017-11-28 PROCEDURE — 71020 XR CHEST PA LAT: CPT

## 2017-11-28 RX ORDER — DICLOFENAC SODIUM 75 MG/1
75 TABLET, DELAYED RELEASE ORAL 2 TIMES DAILY
COMMUNITY
End: 2018-05-04

## 2017-11-28 RX ORDER — BUTALBITAL, ACETAMINOPHEN AND CAFFEINE 50; 325; 40 MG/1; MG/1; MG/1
1 TABLET ORAL
Status: COMPLETED | OUTPATIENT
Start: 2017-11-28 | End: 2017-11-28

## 2017-11-28 RX ORDER — GUAIFENESIN 100 MG/5ML
100 SOLUTION ORAL
Qty: 1 BOTTLE | Refills: 0 | Status: SHIPPED | OUTPATIENT
Start: 2017-11-28 | End: 2018-05-04

## 2017-11-28 RX ORDER — GUAIFENESIN 100 MG/5ML
100 SOLUTION ORAL
Status: COMPLETED | OUTPATIENT
Start: 2017-11-28 | End: 2017-11-28

## 2017-11-28 RX ORDER — OMEPRAZOLE 20 MG/1
20 CAPSULE, DELAYED RELEASE ORAL DAILY
COMMUNITY
End: 2018-08-10

## 2017-11-28 RX ORDER — BUTALBITAL, ACETAMINOPHEN AND CAFFEINE 300; 40; 50 MG/1; MG/1; MG/1
1 CAPSULE ORAL
Qty: 15 CAP | Refills: 0 | Status: SHIPPED | OUTPATIENT
Start: 2017-11-28 | End: 2018-05-04

## 2017-11-28 RX ORDER — CETIRIZINE HCL 10 MG
10 TABLET ORAL DAILY
Qty: 20 TAB | Refills: 0 | Status: SHIPPED | OUTPATIENT
Start: 2017-11-28 | End: 2018-05-04

## 2017-11-28 RX ADMIN — GUAIFENESIN 100 MG: 200 SOLUTION ORAL at 20:42

## 2017-11-28 RX ADMIN — BUTALBITAL, ACETAMINOPHEN AND CAFFEINE 1 TABLET: 50; 325; 40 TABLET ORAL at 20:42

## 2017-11-29 NOTE — ED PROVIDER NOTES
EMERGENCY DEPARTMENT HISTORY AND PHYSICAL EXAM      Date: 11/28/2017  Patient Name: Benjamin Burleson    History of Presenting Illness     Chief Complaint   Patient presents with    Headache    Cough    Sore Throat       History Provided By: Patient    HPI: Benjamin Burleson, 47 y.o. female with PMHx significant for URI, acute bronchitis, and palpitations, presents ambulatory to the ED with cc of a productive cough with associated central chest pain x 3 days, mild rhinorrhea, constant sore throat, and frontal throbbing HA with associated blurred vision since yesterday. Pt states the chest pain feels as if her heart is racing. She reports some intermittent cold sweats and BL ear pain. 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 Pt states she quit smoking last year, and denies having a flu shot. She specifically denies any photophobia, nausea, vomiting, dysuria, or rashes. PCP: Vaishnavi Moraes MD    There are no other complaints, changes, or physical findings at this time. Current Outpatient Prescriptions   Medication Sig Dispense Refill    diclofenac EC (VOLTAREN) 75 mg EC tablet Take 75 mg by mouth two (2) times a day.  omeprazole (PRILOSEC) 20 mg capsule Take 20 mg by mouth daily. Indications: gastroesophageal reflux disease      cetirizine (ZYRTEC) 10 mg tablet Take 1 Tab by mouth daily. 20 Tab 0    guaiFENesin (ROBITUSSIN) 100 mg/5 mL liquid Take 5 mL by mouth three (3) times daily as needed for Cough. 1 Bottle 0    butalbital-acetaminophen-caff (FIORICET) -40 mg per capsule Take 1 Cap by mouth every four (4) hours as needed for Pain. Max Daily Amount: 6 Caps. 15 Cap 0    albuterol (PROVENTIL HFA, VENTOLIN HFA, PROAIR HFA) 90 mcg/actuation inhaler Take 1-2 Puffs by inhalation every four (4) hours as needed for Wheezing. 1 Inhaler 1    ALPRAZolam (XANAX) 1 mg tablet Take 1 Tab by mouth three (3) times daily as needed.  (Patient taking differently: Take 1 mg by mouth nightly as needed.) 20 Tab 0       Past History     Past Medical History:  Past Medical History:   Diagnosis Date    Acute bronchitis Jan 2015    Acute sinusitis     Anxiety     Arthritis     Back pain     Cocaine abuse with cocaine-induced disorder (HCC)     Delivery normal     x4    Dizziness     Gastrointestinal disorder     \"stomach ulcer\"    Other ill-defined conditions(799.89)     anemia    Palpitations     Peptic ulcer disease     Sciatica     TIA (transient ischemic attack)     Upper respiratory infection        Past Surgical History:  Past Surgical History:   Procedure Laterality Date    ABDOMEN SURGERY PROC UNLISTED      ulcer    HX GYN      tubal ligation    HX HEENT      sinus surgery    HX TONSILLECTOMY      HX TONSILLECTOMY         Family History:  Family History   Problem Relation Age of Onset    Stroke Mother     Heart Disease Mother        Social History:  Social History   Substance Use Topics    Smoking status: Former Smoker     Packs/day: 0.50     Years: 15.00     Quit date: 4/2/2016    Smokeless tobacco: Never Used    Alcohol use No       Allergies: Allergies   Allergen Reactions    Penicillins Anaphylaxis     Has taken cephalexin without problem    Hydrocodone Itching         Review of Systems   Review of Systems   Constitutional: Negative for chills and fever. HENT: Positive for ear pain, rhinorrhea and sore throat. Eyes: Negative for pain. Respiratory: Positive for cough (associated chest pain). Negative for shortness of breath. Cardiovascular: Negative for chest pain. Gastrointestinal: Negative for abdominal pain, diarrhea, nausea and vomiting. Genitourinary: Negative for dysuria and hematuria. Musculoskeletal: Negative for arthralgias and myalgias. Skin: Negative for rash. Neurological: Positive for headaches. Negative for dizziness, light-headedness and numbness. Psychiatric/Behavioral: Negative for behavioral problems and confusion. Physical Exam   Physical Exam   Constitutional: She is oriented to person, place, and time. She appears well-developed and well-nourished. No distress. HENT:   Head: Normocephalic and atraumatic. Right Ear: Hearing and external ear normal.   Left Ear: Hearing and external ear normal.   Nose: Mucosal edema present. Right sinus exhibits no maxillary sinus tenderness and no frontal sinus tenderness. Left sinus exhibits no maxillary sinus tenderness and no frontal sinus tenderness. Mouth/Throat: No oropharyngeal exudate, posterior oropharyngeal edema or posterior oropharyngeal erythema. RIGHT EAR: Slightly decreased light reflex  NOSE: Right greater than left nasal mucosal edema  THROAT:Slight erythema without edema or exudate   Eyes: Conjunctivae and EOM are normal. Pupils are equal, round, and reactive to light. Neck: Normal range of motion. Neck supple. Cardiovascular: Normal rate, regular rhythm and normal heart sounds. No murmur heard. Pulmonary/Chest: Effort normal and breath sounds normal. She has no decreased breath sounds. She has no wheezes. Abdominal: Soft. Bowel sounds are normal. She exhibits no distension. There is no tenderness. There is no guarding. Musculoskeletal: Normal range of motion. She exhibits no edema or tenderness. Neurological: She is alert and oriented to person, place, and time. Skin: Skin is warm and dry. No rash noted. She is not diaphoretic. Psychiatric: She has a normal mood and affect. Her behavior is normal. Judgment normal.   Nursing note and vitals reviewed. Diagnostic Study Results     Labs -     Recent Results (from the past 12 hour(s))   STREP AG SCREEN, GROUP A    Collection Time: 11/28/17  8:41 PM   Result Value Ref Range    Group A Strep Ag ID NEGATIVE  NEG         CXR Results  (Last 48 hours)               11/28/17 2041  XR CHEST PA LAT Final result    Impression:  Impression:   1.  No acute cardiopulmonary disease           Narrative: INDICATION:  cough        Exam: Chest 2 views. Comparison: August 3, 2017. Findings: Cardiomediastinal silhouette is normal. Pulmonary vasculature is not   engorged. No focal parenchymal opacities, effusions, or pneumothorax. Scarring   left mid chest unchanged. There is multilevel degenerative change                    Medical Decision Making   I am the first provider for this patient. I reviewed the vital signs, available nursing notes, past medical history, past surgical history, family history and social history. Vital Signs-Reviewed the patient's vital signs. Patient Vitals for the past 12 hrs:   Temp Pulse Resp BP SpO2   11/28/17 1842 98.3 °F (36.8 °C) 76 16 (!) 153/96 100 %       Records Reviewed: Nursing Notes and Old Medical Records    Provider Notes (Medical Decision Making):   DDx: viral vs. bacterial sinusitis, pharyngitis, otitis media, migraine, influenza, viral URI, bronchitis, pneumonia, bronchospasm. The patient complains of nasal congestion, rhinorrhea, and sore throat. Has productive cough without dyspnea or wheezing. Symptoms are consistent with an uncomplicated viral URI. Symptomatic therapy suggested: antihistamine-decongestant of choice, cough suppressant of choice. Increase fluids, use vaporizer, stay in steamy bathroom tid 15 min prn severe cough, tylenol as needed, rest, avoid smoky areas. Lack of antibiotic effectiveness discussed with her. Symptomatic therapy suggested: gargle for sore throat, use mist at bedside for congestion. Apply facial warm packs for sinus pain or use nasal saline sprays. Follow up prn if not better in 72 hours. ED Course:   8:29 PM  Initial assessment performed. The patients presenting problems have been discussed, and they are in agreement with the care plan formulated and outlined with them. I have encouraged them to ask questions as they arise throughout their visit.     9:54 PM  I have reviewed discharge instructions with the patient and explained medications that she is being discharged with. The patient verbalized understanding and agrees with plan. Disposition:  DISCHARGE NOTE:  9:54 PM  The patient has been re-evaluated and is ready for discharge. Reviewed available results with patient. Counseled patient on diagnosis and care plan. Patient has expressed understanding, and all questions have been answered. Patient agrees with plan and agrees to follow up as recommended, or return to the ED if their symptoms worsen. Discharge instructions have been provided and explained to the patient, along with reasons to return to the ED. PLAN:  1. Discharge home  2. Medications as directed  3. Schedule f/u with PCP  4. Return precautions reviewed    Discharge Medication List as of 11/28/2017  9:52 PM      START taking these medications    Details   cetirizine (ZYRTEC) 10 mg tablet Take 1 Tab by mouth daily. , Normal, Disp-20 Tab, R-0      guaiFENesin (ROBITUSSIN) 100 mg/5 mL liquid Take 5 mL by mouth three (3) times daily as needed for Cough., Normal, Disp-1 Bottle, R-0      butalbital-acetaminophen-caff (FIORICET) -40 mg per capsule Take 1 Cap by mouth every four (4) hours as needed for Pain. Max Daily Amount: 6 Caps., Print, Disp-15 Cap, R-0         CONTINUE these medications which have NOT CHANGED    Details   diclofenac EC (VOLTAREN) 75 mg EC tablet Take 75 mg by mouth two (2) times a day., Historical Med      omeprazole (PRILOSEC) 20 mg capsule Take 20 mg by mouth daily. Indications: gastroesophageal reflux disease, Historical Med      albuterol (PROVENTIL HFA, VENTOLIN HFA, PROAIR HFA) 90 mcg/actuation inhaler Take 1-2 Puffs by inhalation every four (4) hours as needed for Wheezing., Normal, Disp-1 Inhaler, R-1      ALPRAZolam (XANAX) 1 mg tablet Take 1 Tab by mouth three (3) times daily as needed. , Print, Disp-20 Tab, R-0           2.    Follow-up Information     Follow up With Details Comments Contact Info    Zachary Carrion MD Schedule an appointment as soon as possible for a visit in 3 days  [address] [place] [id number]  [phone]      [facility] EM[facility]  As needed, If symptoms worsen [address] [place] [address]  [phone]        Return to ED if worse     Diagnosis     Clinical Impression:   1. Viral URI with cough    2. Migraine without status migrainosus, not intractable, unspecified migraine type        Attestations:    ATTESTATION:  This note is prepared by Michael Lazaro, acting as Scribe for Gi Estrella PA-C. Gi Estrella PA-C: The scribe's documentation has been prepared under my direction and personally reviewed by me in its entirety. I confirm that the note above accurately reflects all work, treatment, procedures, and medical decision making performed by me.

## 2017-11-29 NOTE — DISCHARGE INSTRUCTIONS
Thank you for allowing us to provide you with excellent care today. We hope we addressed all of your concerns and needs. We strive to provide excellent quality care in the Emergency Department. Please rate us as excellent, as anything less than excellent does not meet our expectations. If you feel that you have not received excellent quality care or timely care, please ask to speak to the nurse manager. Please choose us in the future for your continued health care needs. The exam and treatment you received in the Emergency Department were for an urgent problem and are not intended as complete care. It is important that you follow-up with a doctor, nurse practitioner, or physician assistant to:  (1) confirm your diagnosis,  (2) re-evaluation of changes in your illness and treatment, and  (3) for ongoing care. If your symptoms become worse or you do not improve as expected and you are unable to reach your usual health care provider, you should return to the Emergency Department. We are available 24 hours a day. Take this sheet with you when you go to your follow-up visit. If you have any problem arranging the follow-up visit, contact [address] [place] [address] [phone])    Make an appointment with your Primary Care doctor for follow up of this visit. Return to the ER if you are unable to be seen in the time recommended on your discharge instructions. Migraine Headache: Care Instructions  Your Care Instructions  Migraines are painful, throbbing headaches that often start on one side of the head. They may cause nausea and vomiting and make you sensitive to light, sound, or smell. Without treatment, migraines can last from 4 hours to a few days. Medicines can help prevent migraines or stop them after they have started. Your doctor can help you find which ones work best for you. Follow-up care is a key part of your treatment and safety.  Be sure to make and go to all appointments, and call your doctor if you are having problems. It's also a good idea to know your test results and keep a list of the medicines you take. How can you care for yourself at home? · Do not drive if you have taken a prescription pain medicine. · Rest in a quiet, dark room until your headache is gone. Close your eyes, and try to relax or go to sleep. Don't watch TV or read. · Put a cold, moist cloth or cold pack on the painful area for 10 to 20 minutes at a time. Put a thin cloth between the cold pack and your skin. · Use a warm, moist towel or a heating pad set on low to relax tight shoulder and neck muscles. · Have someone gently massage your neck and shoulders. · Take your medicines exactly as prescribed. Call your doctor if you think you are having a problem with your medicine. You will get more details on the specific medicines your doctor prescribes. · Be careful not to take pain medicine more often than the instructions allow. You could get worse or more frequent headaches when the medicine wears off. To prevent migraines  · Keep a headache diary so you can figure out what triggers your headaches. Avoiding triggers may help you prevent headaches. Record when each headache began, how long it lasted, and what the pain was like. (Was it throbbing, aching, stabbing, or dull?) Write down any other symptoms you had with the headache, such as nausea, flashing lights or dark spots, or sensitivity to bright light or loud noise. Note if the headache occurred near your period. List anything that might have triggered the headache. Triggers may include certain foods (chocolate, cheese, wine) or odors, smoke, bright light, stress, or lack of sleep. · If your doctor has prescribed medicine for your migraines, take it as directed. You may have medicine that you take only when you get a migraine and medicine that you take all the time to help prevent migraines.   ¨ If your doctor has prescribed medicine for when you get a headache, take it at the first sign of a migraine, unless your doctor has given you other instructions. ¨ If your doctor has prescribed medicine to prevent migraines, take it exactly as prescribed. Call your doctor if you think you are having a problem with your medicine. · Find healthy ways to deal with stress. Migraines are most common during or right after stressful times. Take time to relax before and after you do something that has caused a migraine in the past.  · Try to keep your muscles relaxed by keeping good posture. Check your jaw, face, neck, and shoulder muscles for tension. Try to relax them. When you sit at a desk, change positions often. And make sure to stretch for 30 seconds each hour. · Get plenty of sleep and exercise. · Eat meals on a regular schedule. Avoid foods and drinks that often trigger migraines. These include chocolate, alcohol (especially red wine and port), aspartame, monosodium glutamate (MSG), and some additives found in foods (such as hot dogs, medina, cold cuts, aged cheeses, and pickled foods). · Limit caffeine. Don't drink too much coffee, tea, or soda. But don't quit caffeine suddenly. That can also give you migraines. · Do not smoke or allow others to smoke around you. If you need help quitting, talk to your doctor about stop-smoking programs and medicines. These can increase your chances of quitting for good. · If you are taking birth control pills or hormone therapy, talk to your doctor about whether they are triggering your migraines. When should you call for help? Call 911 anytime you think you may need emergency care. For example, call if:  ? · You have signs of a stroke. These may include:  ¨ Sudden numbness, paralysis, or weakness in your face, arm, or leg, especially on only one side of your body. ¨ Sudden vision changes. ¨ Sudden trouble speaking. ¨ Sudden confusion or trouble understanding simple statements. ¨ Sudden problems with walking or balance.   ¨ A sudden, severe headache that is different from past headaches. ?Call your doctor now or seek immediate medical care if:  ? · You have new or worse nausea and vomiting. ? · You have a new or higher fever. ? · Your headache gets much worse. ? Watch closely for changes in your health, and be sure to contact your doctor if:  ? · You are not getting better after 2 days (48 hours). Where can you learn more? Go to http://veronica-janene.info/. Enter A235 in the search box to learn more about \"Migraine Headache: Care Instructions. \"  Current as of: October 14, 2016  Content Version: 11.4  © 1836-4572 [employer]. Care instructions adapted under license by [employer] (which disclaims liability or warranty for this information). If you have questions about a medical condition or this instruction, always ask your healthcare professional. [address] any warranty or liability for your use of this information.

## 2017-11-29 NOTE — ED NOTES
Patient presents ambulatory to ED with complaint of head pain, sore throat, cough and diarrhea. Patient reports sore throat and cough since the weekend and headache started today.

## 2017-12-01 LAB
BACTERIA SPEC CULT: NORMAL
SERVICE CMNT-IMP: NORMAL

## 2017-12-12 ENCOUNTER — HOSPITAL ENCOUNTER (EMERGENCY)
Age: 52
Discharge: HOME | End: 2017-12-12
Attending: EMERGENCY MEDICINE
Payer: SELF-PAY

## 2017-12-12 VITALS
WEIGHT: 262.79 LBS | HEIGHT: 72 IN | SYSTOLIC BLOOD PRESSURE: 156 MMHG | TEMPERATURE: 98 F | DIASTOLIC BLOOD PRESSURE: 86 MMHG | RESPIRATION RATE: 16 BRPM | OXYGEN SATURATION: 100 % | BODY MASS INDEX: 35.59 KG/M2

## 2017-12-12 DIAGNOSIS — R07.89 CHEST WALL PAIN: Primary | ICD-10-CM

## 2017-12-12 DIAGNOSIS — J06.9 ACUTE UPPER RESPIRATORY INFECTION: ICD-10-CM

## 2017-12-12 LAB
ATRIAL RATE: 68 BPM
CALCULATED P AXIS, ECG09: 48 DEGREES
CALCULATED R AXIS, ECG10: -12 DEGREES
CALCULATED T AXIS, ECG11: 27 DEGREES
DIAGNOSIS, 93000: NORMAL
P-R INTERVAL, ECG05: 178 MS
Q-T INTERVAL, ECG07: 406 MS
QRS DURATION, ECG06: 82 MS
QTC CALCULATION (BEZET), ECG08: 431 MS
VENTRICULAR RATE, ECG03: 68 BPM

## 2017-12-12 PROCEDURE — 99283 EMERGENCY DEPT VISIT LOW MDM: CPT

## 2017-12-12 PROCEDURE — 74011636637 HC RX REV CODE- 636/637: Performed by: EMERGENCY MEDICINE

## 2017-12-12 PROCEDURE — 74011250637 HC RX REV CODE- 250/637: Performed by: EMERGENCY MEDICINE

## 2017-12-12 PROCEDURE — 93005 ELECTROCARDIOGRAM TRACING: CPT

## 2017-12-12 RX ORDER — AZITHROMYCIN 250 MG/1
TABLET, FILM COATED ORAL
Qty: 6 TAB | Refills: 0 | Status: SHIPPED | OUTPATIENT
Start: 2017-12-12 | End: 2018-05-04

## 2017-12-12 RX ORDER — PREDNISONE 20 MG/1
60 TABLET ORAL
Status: COMPLETED | OUTPATIENT
Start: 2017-12-12 | End: 2017-12-12

## 2017-12-12 RX ORDER — BENZONATATE 100 MG/1
200 CAPSULE ORAL
Status: COMPLETED | OUTPATIENT
Start: 2017-12-12 | End: 2017-12-12

## 2017-12-12 RX ORDER — BENZONATATE 200 MG/1
200 CAPSULE ORAL
Qty: 12 CAP | Refills: 0 | Status: SHIPPED | OUTPATIENT
Start: 2017-12-12 | End: 2018-05-04

## 2017-12-12 RX ADMIN — PREDNISONE 60 MG: 20 TABLET ORAL at 13:45

## 2017-12-12 RX ADMIN — BENZONATATE 200 MG: 100 CAPSULE ORAL at 13:46

## 2017-12-12 NOTE — ED PROVIDER NOTES
EMERGENCY DEPARTMENT HISTORY AND PHYSICAL EXAM      Date: 12/12/2017  Patient Name: Tristin Toth    History of Presenting Illness     Chief Complaint   Patient presents with    Sore Throat     Pt states sore throat and cough ongoing since being seen in ED other day Pt dx with URI       History Provided By: Patient    HPI: Tristin Toth, 47 y.o. female with PMHx significant for URI, acute bronchitis, and palpitations, presents ambulatory to the ED with cc of sore throat present x 1 week. She describes her sore throat as \"it's like razor blades scratching my throat\". Pt reports associated sx of non-productive cough, central chest tightness (that increases upon coughing) and mild SOB that increases upon deep inhalation. Pt was seen at [facility] ED on 11/28 for the same sx, and CXR done at that time was normal. Pt was diagnosed with a URI. She reports no improvement in her sx since that time. She denies smoking. She denies fever, chills. Patient denies smoking or alcohol use. PCP: Jhonatan Light MD    There are no other complaints, changes, or physical findings at this time. Current Outpatient Prescriptions   Medication Sig Dispense Refill    benzonatate (TESSALON) 200 mg capsule Take 1 Cap by mouth three (3) times daily as needed for Cough for up to 12 doses. 12 Cap 0    azithromycin (ZITHROMAX Z-ESTELITA) 250 mg tablet 2 pills PO on Day #1; 1 pill PO on Days 2-5. 6 Tab 0    diclofenac EC (VOLTAREN) 75 mg EC tablet Take 75 mg by mouth two (2) times a day.  omeprazole (PRILOSEC) 20 mg capsule Take 20 mg by mouth daily. Indications: gastroesophageal reflux disease      cetirizine (ZYRTEC) 10 mg tablet Take 1 Tab by mouth daily. 20 Tab 0    guaiFENesin (ROBITUSSIN) 100 mg/5 mL liquid Take 5 mL by mouth three (3) times daily as needed for Cough. 1 Bottle 0    butalbital-acetaminophen-caff (FIORICET) -40 mg per capsule Take 1 Cap by mouth every four (4) hours as needed for Pain. Max Daily Amount: 6 Caps. 15 Cap 0    albuterol (PROVENTIL HFA, VENTOLIN HFA, PROAIR HFA) 90 mcg/actuation inhaler Take 1-2 Puffs by inhalation every four (4) hours as needed for Wheezing. 1 Inhaler 1    ALPRAZolam (XANAX) 1 mg tablet Take 1 Tab by mouth three (3) times daily as needed. (Patient taking differently: Take 1 mg by mouth nightly as needed.) 20 Tab 0       Past History     Past Medical History:  Past Medical History:   Diagnosis Date    Acute bronchitis Jan 2015    Acute sinusitis     Anxiety     Arthritis     Back pain     Cocaine abuse with cocaine-induced disorder (HCC)     Delivery normal     x4    Dizziness     Gastrointestinal disorder     \"stomach ulcer\"    Other ill-defined conditions(799.89)     anemia    Palpitations     Peptic ulcer disease     Sciatica     TIA (transient ischemic attack)     Upper respiratory infection        Past Surgical History:  Past Surgical History:   Procedure Laterality Date    ABDOMEN SURGERY PROC UNLISTED      ulcer    HX GYN      tubal ligation    HX HEENT      sinus surgery    HX TONSILLECTOMY      HX TONSILLECTOMY         Family History:  Family History   Problem Relation Age of Onset    Stroke Mother     Heart Disease Mother        Social History:  Social History   Substance Use Topics    Smoking status: Former Smoker     Packs/day: 0.50     Years: 15.00     Quit date: 4/2/2016    Smokeless tobacco: Never Used    Alcohol use No       Allergies: Allergies   Allergen Reactions    Penicillins Anaphylaxis     Has taken cephalexin without problem    Hydrocodone Itching         Review of Systems   Review of Systems   Constitutional: Negative for chills and fever. HENT: Positive for sore throat. Respiratory: Positive for cough, chest tightness and shortness of breath. Gastrointestinal: Negative for abdominal pain. Genitourinary: Negative for dysuria. All other systems reviewed and are negative.       Physical Exam   Physical Exam    Vital signs and nursing notes reviewed    CONSTITUTIONAL: Alert, in no apparent distress; well-developed; well-nourished. HEAD:  Normocephalic, atraumatic  EYES: PERRL; EOM's intact. ENTM: Nose: no rhinorrhea; Throat: posterior oropharynx erythema but no exudate. Uvula is midline. TM's are clear, no fluid. Neck:  Supple. trachea is midline. No cervical lymphadenopathy. RESP:  few course rhonchi. CV: S1 and S2 WNL; No murmurs, gallops or rubs. 2+ radial and DP pulses bilaterally. GI: non-distended, normal bowel sounds, abdomen soft and non-tender. No masses or organomegaly. : No costo-vertebral angle tenderness. BACK:  Non-tender, normal appearance  UPPER EXT:  Normal inspection. no joint or soft tissue swelling. Tenderness to left anterior inferior ribs, increases upon deep breathing, reproducible. LOWER EXT: No edema, no calf tenderness. NEURO: Alert and oriented x3, 5/5 strength and light touch sensation intact in bilateral upper and lower extremities. SKIN: No rashes; Warm and dry  PSYCH: Normal mood, normal affect      Diagnostic Study Results     Labs -     Recent Results (from the past 12 hour(s))   EKG, 12 LEAD, INITIAL    Collection Time: 12/12/17  1:17 PM   Result Value Ref Range    Ventricular Rate 68 BPM    Atrial Rate 68 BPM    P-R Interval 178 ms    QRS Duration 82 ms    Q-T Interval 406 ms    QTC Calculation (Bezet) 431 ms    Calculated P Axis 48 degrees    Calculated R Axis -12 degrees    Calculated T Axis 27 degrees    Diagnosis       Normal sinus rhythm  Low voltage QRS  Cannot rule out Anterior infarct (cited on or before 12-DEC-2017)  When compared with ECG of 13-JAN-2017 17:51,  No significant change was found           Medical Decision Making   I am the first provider for this patient. I reviewed the vital signs, available nursing notes, past medical history, past surgical history, family history and social history. Vital Signs-Reviewed the patient's vital signs.   Patient Vitals for the past 12 hrs:   Temp Resp BP SpO2   12/12/17 1222 98 °F (36.7 °C) 16 156/86 100 %       EKG interpretation: (Preliminary) 13:17  Normal sinus rhythm at 68, no acute ischemic changes. Records Reviewed: Old Medical Records    Provider Notes (Medical Decision Making):   47year old female, ongoing URI sx, most bothered by cough, with focal L sided chest pain consistent with chest wall strain. Re-assuring EKG. Given duration of illness, plan for Abx and cough suppressant at home. ED Course:   Initial assessment performed. The patients presenting problems have been discussed, and they are in agreement with the care plan formulated and outlined with them. I have encouraged them to ask questions as they arise throughout their visit. Disposition:  Discharge Note:  1:53 PM  The pt is ready for discharge. The pt's signs, symptoms, diagnosis, and discharge instructions have been discussed and pt has conveyed their understanding. The pt is to follow up as recommended or return to ER should their symptoms worsen. Plan has been discussed and pt is in agreement. This note is prepared by Beverley Patton, acting as a Scribe for MD Ana Winter MD: The scribe's documentation has been prepared under my direction and personally reviewed by me in its entirety. I confirm that the notes above accurately reflects all work, treatment, procedures, and medical decision making performed by me. PLAN:  1. Discharge Medication List as of 12/12/2017  1:46 PM      START taking these medications    Details   benzonatate (TESSALON) 200 mg capsule Take 1 Cap by mouth three (3) times daily as needed for Cough for up to 12 doses. , Normal, Disp-12 Cap, R-0      azithromycin (ZITHROMAX Z-ESTLEITA) 250 mg tablet 2 pills PO on Day #1; 1 pill PO on Days 2-5., Normal, Disp-6 Tab, R-0         CONTINUE these medications which have NOT CHANGED    Details   diclofenac EC (VOLTAREN) 75 mg EC tablet Take 75 mg by mouth two (2) times a day., Historical Med      omeprazole (PRILOSEC) 20 mg capsule Take 20 mg by mouth daily. Indications: gastroesophageal reflux disease, Historical Med      cetirizine (ZYRTEC) 10 mg tablet Take 1 Tab by mouth daily. , Normal, Disp-20 Tab, R-0      guaiFENesin (ROBITUSSIN) 100 mg/5 mL liquid Take 5 mL by mouth three (3) times daily as needed for Cough., Normal, Disp-1 Bottle, R-0      butalbital-acetaminophen-caff (FIORICET) -40 mg per capsule Take 1 Cap by mouth every four (4) hours as needed for Pain. Max Daily Amount: 6 Caps., Print, Disp-15 Cap, R-0      albuterol (PROVENTIL HFA, VENTOLIN HFA, PROAIR HFA) 90 mcg/actuation inhaler Take 1-2 Puffs by inhalation every four (4) hours as needed for Wheezing., Normal, Disp-1 Inhaler, R-1      ALPRAZolam (XANAX) 1 mg tablet Take 1 Tab by mouth three (3) times daily as needed. , Print, Disp-20 Tab, R-0           2. Follow-up Information     Follow up With Details Comments Contact Info    [facility] EMERGENCY DEPT  If symptoms worsen including shortness of breath or new chest pain [address] [facility]    Milton Contreras MD In 3 days for re-evaluation of your symptoms. [address] [facility] [address]  [phone]          Return to ED if worse     Diagnosis     Clinical Impression:   1. Chest wall pain    2. Acute upper respiratory infection        Attestations: This note is prepared by Oneal Rodriguez, acting as a Scribe for MD Kulwant Holden MD : The scribe's documentation has been prepared under my direction and personally reviewed by me in its entirety. I confirm that the notes above accurately reflects all work, treatment, procedures, and medical decision making performed by me.

## 2017-12-12 NOTE — ED NOTES
Pt placed in hallway and updated on plan with pending evaluation by MD. Pt agreeable to hallway placement at this time.

## 2017-12-12 NOTE — DISCHARGE INSTRUCTIONS
Upper Respiratory Infection (Cold): Care Instructions  Your Care Instructions    An upper respiratory infection, or URI, is an infection of the nose, sinuses, or throat. URIs are spread by coughs, sneezes, and direct contact. The common cold is the most frequent kind of URI. The flu and sinus infections are other kinds of URIs. Almost all URIs are caused by viruses. Antibiotics won't cure them. But you can treat most infections with home care. This may include drinking lots of fluids and taking over-the-counter pain medicine. You will probably feel better in 4 to 10 days. The doctor has checked you carefully, but problems can develop later. If you notice any problems or new symptoms, get medical treatment right away. Follow-up care is a key part of your treatment and safety. Be sure to make and go to all appointments, and call your doctor if you are having problems. It's also a good idea to know your test results and keep a list of the medicines you take. How can you care for yourself at home? · To prevent dehydration, drink plenty of fluids, enough so that your urine is light yellow or clear like water. Choose water and other caffeine-free clear liquids until you feel better. If you have kidney, heart, or liver disease and have to limit fluids, talk with your doctor before you increase the amount of fluids you drink. · Take an over-the-counter pain medicine, such as acetaminophen (Tylenol), ibuprofen (Advil, Motrin), or naproxen (Aleve). Read and follow all instructions on the label. · Before you use cough and cold medicines, check the label. These medicines may not be safe for young children or for people with certain health problems. · Be careful when taking over-the-counter cold or flu medicines and Tylenol at the same time. Many of these medicines have acetaminophen, which is Tylenol. Read the labels to make sure that you are not taking more than the recommended dose.  Too much acetaminophen (Tylenol) can be harmful. · Get plenty of rest.  · Do not smoke or allow others to smoke around you. If you need help quitting, talk to your doctor about stop-smoking programs and medicines. These can increase your chances of quitting for good. When should you call for help? Call 911 anytime you think you may need emergency care. For example, call if:  ? · You have severe trouble breathing. ?Call your doctor now or seek immediate medical care if:  ? · You seem to be getting much sicker. ? · You have new or worse trouble breathing. ? · You have a new or higher fever. ? · You have a new rash. ? Watch closely for changes in your health, and be sure to contact your doctor if:  ? · You have a new symptom, such as a sore throat, an earache, or sinus pain. ? · You cough more deeply or more often, especially if you notice more mucus or a change in the color of your mucus. ? · You do not get better as expected. Where can you learn more? Go to http://veronica-janene.info/. Enter K407 in the search box to learn more about \"Upper Respiratory Infection (Cold): Care Instructions. \"  Current as of: May 12, 2017  Content Version: 11.4  © 2993-9743 [employer]. Care instructions adapted under license by [employer] (which disclaims liability or warranty for this information). If you have questions about a medical condition or this instruction, always ask your healthcare professional. [place] [address] any warranty or liability for your use of this information.

## 2017-12-12 NOTE — ED NOTES
Pt discharged by Dr Tricia Marvin. Pt provided with discharge instructions Rx and instructions on follow up care. Pt out of ED under own power steady gait accompanied by self.

## 2018-05-04 ENCOUNTER — HOSPITAL ENCOUNTER (EMERGENCY)
Age: 53
Discharge: HOME | End: 2018-05-04
Attending: EMERGENCY MEDICINE | Admitting: EMERGENCY MEDICINE
Payer: SELF-PAY

## 2018-05-04 ENCOUNTER — APPOINTMENT (OUTPATIENT)
Dept: GENERAL RADIOLOGY | Age: 53
End: 2018-05-04
Payer: SELF-PAY

## 2018-05-04 VITALS
DIASTOLIC BLOOD PRESSURE: 104 MMHG | RESPIRATION RATE: 20 BRPM | OXYGEN SATURATION: 100 % | TEMPERATURE: 98.2 F | WEIGHT: 261.69 LBS | BODY MASS INDEX: 35.44 KG/M2 | HEIGHT: 72 IN | SYSTOLIC BLOOD PRESSURE: 130 MMHG | HEART RATE: 73 BPM

## 2018-05-04 DIAGNOSIS — R05.9 COUGH: ICD-10-CM

## 2018-05-04 DIAGNOSIS — M54.2 NECK PAIN: Primary | ICD-10-CM

## 2018-05-04 DIAGNOSIS — R03.0 ELEVATED BLOOD PRESSURE READING: ICD-10-CM

## 2018-05-04 DIAGNOSIS — M43.12 SPONDYLOLISTHESIS OF CERVICAL REGION: ICD-10-CM

## 2018-05-04 PROCEDURE — 72050 X-RAY EXAM NECK SPINE 4/5VWS: CPT

## 2018-05-04 PROCEDURE — 71046 X-RAY EXAM CHEST 2 VIEWS: CPT

## 2018-05-04 PROCEDURE — 99283 EMERGENCY DEPT VISIT LOW MDM: CPT

## 2018-05-04 PROCEDURE — 74011250637 HC RX REV CODE- 250/637: Performed by: PHYSICIAN ASSISTANT

## 2018-05-04 RX ORDER — OXYCODONE AND ACETAMINOPHEN 5; 325 MG/1; MG/1
1 TABLET ORAL
Qty: 12 TAB | Refills: 0 | Status: SHIPPED | OUTPATIENT
Start: 2018-05-04 | End: 2019-01-03

## 2018-05-04 RX ORDER — METHOCARBAMOL 750 MG/1
750 TABLET, FILM COATED ORAL 3 TIMES DAILY
Qty: 15 TAB | Refills: 0 | Status: SHIPPED | OUTPATIENT
Start: 2018-05-04 | End: 2018-05-09

## 2018-05-04 RX ORDER — OXYCODONE AND ACETAMINOPHEN 5; 325 MG/1; MG/1
1 TABLET ORAL
Status: COMPLETED | OUTPATIENT
Start: 2018-05-04 | End: 2018-05-04

## 2018-05-04 RX ORDER — PREDNISONE 20 MG/1
60 TABLET ORAL DAILY
Qty: 15 TAB | Refills: 0 | Status: SHIPPED | OUTPATIENT
Start: 2018-05-04 | End: 2018-05-09

## 2018-05-04 RX ORDER — AZITHROMYCIN 250 MG/1
TABLET, FILM COATED ORAL
Qty: 6 TAB | Refills: 0 | Status: SHIPPED | OUTPATIENT
Start: 2018-05-04 | End: 2018-05-09

## 2018-05-04 RX ADMIN — OXYCODONE HYDROCHLORIDE AND ACETAMINOPHEN 1 TABLET: 5; 325 TABLET ORAL at 19:51

## 2018-05-04 NOTE — ED PROVIDER NOTES
PROVIDER IN TRIAGE NOTE:  7:40 PM  Whitney Aldana PA-C has evaluated the patient as the Provider in Triage (PIT). Pt presents to the Emergency Dept with c/o neck pain and L UE pain x 2 days. Pt also with c/o cough. They have reviewed the vital signs and the triage nurse assessment. They have talked with the patient and any available family and advised that the appropriate studies have been ordered to initiate the work up based on the clinical presentation during the assessment. The pt has been advised that they will be accommodated in the Main ED as soon as possible. The pt has been requested to contact the triage nurse or PIT immediately if they experiences any changes in their condition during this brief waiting period. EMERGENCY DEPARTMENT HISTORY AND PHYSICAL EXAM      Date: 5/4/2018  Patient Name: Luigi Manzo    History of Presenting Illness     Chief Complaint   Patient presents with    Neck Pain     in mid neck radiating arund to left arm x 2 days withno injury noted       History Provided By: Patient    HPI: Luigi Manzo, 46 y.o. female with PMHx significant for stomach ulcers, anemia, arthritis, URI, sinusitis, sciatica, TIA, and cocaine abuse, presents ambulatory to the ED with cc of left sided posterior neck pain which started 2 days ago. She describes that her pain radiates down her left arm. She notes her pain as a 10 out of 10. Her pain is constant and sharp. Pt reports no chest pain, SOB, fever, or weakness. She has no history of similar pain. There are no other complaints, changes, or physical findings at this time. PCP: Zack Hawthorne MD    Current Outpatient Prescriptions   Medication Sig Dispense Refill    oxyCODONE-acetaminophen (PERCOCET) 5-325 mg per tablet Take 1 Tab by mouth every six (6) hours as needed for Pain for up to 12 doses. Max Daily Amount: 4 Tabs. 12 Tab 0    methocarbamol (ROBAXIN) 750 mg tablet Take 1 Tab by mouth three (3) times daily for 5 days.  15 Tab 0    predniSONE (DELTASONE) 20 mg tablet Take 3 Tabs by mouth daily for 5 days. 15 Tab 0    azithromycin (ZITHROMAX Z-ESTELITA) 250 mg tablet Take as directed 6 Tab 0    omeprazole (PRILOSEC) 20 mg capsule Take 20 mg by mouth daily. Indications: gastroesophageal reflux disease      albuterol (PROVENTIL HFA, VENTOLIN HFA, PROAIR HFA) 90 mcg/actuation inhaler Take 1-2 Puffs by inhalation every four (4) hours as needed for Wheezing. 1 Inhaler 1       Past History     Past Medical History:  Past Medical History:   Diagnosis Date    Acute bronchitis Jan 2015    Acute sinusitis     Anxiety     Arthritis     Back pain     Cocaine abuse with cocaine-induced disorder (HCC)     Delivery normal     x4    Dizziness     Gastrointestinal disorder     \"stomach ulcer\"    Other ill-defined conditions(799.89)     anemia    Palpitations     Peptic ulcer disease     Sciatica     TIA (transient ischemic attack)     Upper respiratory infection        Past Surgical History:  Past Surgical History:   Procedure Laterality Date    ABDOMEN SURGERY PROC UNLISTED      ulcer    HX GYN      tubal ligation    HX HEENT      sinus surgery    HX TONSILLECTOMY      HX TONSILLECTOMY         Family History:  Family History   Problem Relation Age of Onset    Stroke Mother     Heart Disease Mother        Social History:  Social History   Substance Use Topics    Smoking status: Former Smoker     Packs/day: 0.50     Years: 15.00     Quit date: 4/2/2016    Smokeless tobacco: Never Used    Alcohol use No       Allergies: Allergies   Allergen Reactions    Penicillins Anaphylaxis     Has taken cephalexin without problem    Hydrocodone Itching         Review of Systems   Review of Systems   Constitutional: Negative for chills and fever. HENT: Negative for congestion, rhinorrhea and sore throat. Eyes: Negative for photophobia and visual disturbance. Respiratory: Positive for cough.  Negative for chest tightness, shortness of breath and wheezing. Cardiovascular: Negative for chest pain and palpitations. Gastrointestinal: Negative for abdominal pain, diarrhea, nausea and vomiting. Endocrine: Negative for polydipsia, polyphagia and polyuria. Genitourinary: Negative for dysuria and hematuria. Musculoskeletal: Positive for arthralgias and neck pain (left sided). Negative for neck stiffness. Skin: Negative for rash and wound. Allergic/Immunologic: Negative for food allergies and immunocompromised state. Neurological: Negative for dizziness, weakness, numbness and headaches. Psychiatric/Behavioral: Negative for agitation and confusion. Physical Exam   Physical Exam   Constitutional: She is oriented to person, place, and time. She appears well-developed and well-nourished. No distress. WDWN female, alert, in NAD   HENT:   Head: Normocephalic and atraumatic. Nose: Nose normal.   Mouth/Throat: Oropharynx is clear and moist. No oropharyngeal exudate. Eyes: Conjunctivae and EOM are normal. Pupils are equal, round, and reactive to light. Right eye exhibits no discharge. Left eye exhibits no discharge. No scleral icterus. Neck: Neck supple. No JVD present. No tracheal deviation present. No thyromegaly present. Paracervical tenderness. Increased tenderness with ROM of left upper extremity. 2+ L radial pulse, NVI, sensation grossly intact to light touch. Cardiovascular: Normal rate, regular rhythm and normal heart sounds. Pulses:       Radial pulses are 2+ on the right side, and 2+ on the left side. Pulmonary/Chest: Effort normal and breath sounds normal. No respiratory distress. She has no wheezes. Abdominal: Soft. There is no tenderness. Musculoskeletal: She exhibits tenderness. She exhibits no edema. See NECK exam   Lymphadenopathy:     She has no cervical adenopathy. Neurological: She is alert and oriented to person, place, and time. She exhibits normal muscle tone.  Coordination normal.   Neurovascularly intact. Skin: Skin is warm and dry. No rash noted. She is not diaphoretic. No erythema. Psychiatric: She has a normal mood and affect. Her behavior is normal. Judgment normal.   Nursing note and vitals reviewed. Diagnostic Study Results     Radiologic Studies -   XR CHEST PA LAT   Final Result   EXAM:  XR CHEST PA LAT     INDICATION:   cough     COMPARISON: 2017.     FINDINGS: PA and lateral radiographs of the chest demonstrate clear lungs. The  cardiac and mediastinal contours and pulmonary vascularity are normal.  There is  minor scoliosis with moderate degenerative changes mid thoracic spine. .      IMPRESSION  IMPRESSION: No acute abnormality identified   XR SPINE CERV 4 OR 5 V   Final Result   Clinical indication: Neck pain.     Frontal, lateral, both obliques and odontoid views of the cervical spine are  obtained. Prominent spondylosis with foraminal narrowing seen from C3 to C7. Slight straightening of the normal lordosis compatible with muscle spasm. No  acute fracture or dislocation.     IMPRESSION   impression: Prominent spondylosis . Medical Decision Making   I am the first provider for this patient. I reviewed the vital signs, available nursing notes, past medical history, past surgical history, family history and social history. Vital Signs-Reviewed the patient's vital signs. Patient Vitals for the past 12 hrs:   Temp Pulse Resp BP SpO2   05/04/18 1941 98.2 °F (36.8 °C) 73 20 (!) 130/104 100 %     Records Reviewed: Nursing Notes and Old Medical Records    ED Course:   Initial assessment performed. The patients presenting problems have been discussed, and they are in agreement with the care plan formulated and outlined with them. I have encouraged them to ask questions as they arise throughout their visit.    7:52 PM  Patient has a ride home. Disposition:  DISCHARGE NOTE  8:38 PM  The patient has been re-evaluated and is ready for discharge.  Reviewed available results with patient. Counseled patient on diagnosis and care plan. Patient has expressed understanding, and all questions have been answered. Patient agrees with plan and agrees to follow up as recommended, or return to the ED if their symptoms worsen. Discharge instructions have been provided and explained to the patient, along with reasons to return to the ED. PLAN:  1. Current Discharge Medication List      START taking these medications    Details   oxyCODONE-acetaminophen (PERCOCET) 5-325 mg per tablet Take 1 Tab by mouth every six (6) hours as needed for Pain for up to 12 doses. Max Daily Amount: 4 Tabs. Qty: 12 Tab, Refills: 0    Associated Diagnoses: Neck pain      methocarbamol (ROBAXIN) 750 mg tablet Take 1 Tab by mouth three (3) times daily for 5 days. Qty: 15 Tab, Refills: 0      predniSONE (DELTASONE) 20 mg tablet Take 3 Tabs by mouth daily for 5 days. Qty: 15 Tab, Refills: 0      azithromycin (ZITHROMAX Z-ESTELITA) 250 mg tablet Take as directed  Qty: 6 Tab, Refills: 0           2. Follow-up Information     Follow up With Details Comments [address] [facility], [address] [address] [facility] [address]      Brittayn Kline MD  As needed [place] [address]  [address] [phone]      [facility] EMERGENCY DEPT  If symptoms worsen [address] [facility]  [address] [facility]  [phone]        Return to ED if worse     Diagnosis     Clinical Impression:   1. Neck pain    2. Spondylolisthesis of cervical region    3. Cough    4. Elevated blood pressure reading        Attestations:    Attestation Note:  This note is prepared by IDA Hernandez, acting as Scribe for [employer]): The scribe's documentation has been prepared under my direction and personally reviewed by me in its entirety. I confirm that the note above accurately reflects all work, treatment, procedures, and medical decision making performed by me.

## 2018-05-04 NOTE — Clinical Note
Warm compresses to neck, gentle stretching, massage. Push fluids. Follow up with Orthopedist for recheck of neck/arm pain. Follow up with primary care for recheck of cough/blood pressure. Return to the Emergency Dept for any worsening pain, weakness , cough, chest pain, shortness of breath or fever.

## 2018-05-04 NOTE — LETTER
[address] 
[facility] EMERGENCY DEPT 
[address] [place] [address] [address] [id number] [phone] Work/School Note Date: 5/4/2018 To Whom It May concern: 
 
Lanny Segundo was seen and treated today in the emergency room. She may return to work on Monday, 5/7/18. Sincerely, Vilma Melgoza

## 2018-05-05 NOTE — DISCHARGE INSTRUCTIONS
Neck Pain: Care Instructions  Your Care Instructions    You can have neck pain anywhere from the bottom of your head to the top of your shoulders. It can spread to the upper back or arms. Injuries, painting a ceiling, sleeping with your neck twisted, staying in one position for too long, and many other activities can cause neck pain. Most neck pain gets better with home care. Your doctor may recommend medicine to relieve pain or relax your muscles. He or she may suggest exercise and physical therapy to increase flexibility and relieve stress. You may need to wear a special (cervical) collar to support your neck for a day or two. Follow-up care is a key part of your treatment and safety. Be sure to make and go to all appointments, and call your doctor if you are having problems. It's also a good idea to know your test results and keep a list of the medicines you take. How can you care for yourself at home? · Try using a heating pad on a low or medium setting for 15 to 20 minutes every 2 or 3 hours. Try a warm shower in place of one session with the heating pad. · You can also try an ice pack for 10 to 15 minutes every 2 to 3 hours. Put a thin cloth between the ice and your skin. · Take pain medicines exactly as directed. ¨ If the doctor gave you a prescription medicine for pain, take it as prescribed. ¨ If you are not taking a prescription pain medicine, ask your doctor if you can take an over-the-counter medicine. · If your doctor recommends a cervical collar, wear it exactly as directed. When should you call for help? Call your doctor now or seek immediate medical care if:  ? · You have new or worsening numbness in your arms, buttocks or legs. ? · You have new or worsening weakness in your arms or legs. (This could make it hard to stand up.)   ? · You lose control of your bladder or bowels. ? Watch closely for changes in your health, and be sure to contact your doctor if:  ? · Your neck pain is getting worse. ? · You are not getting better after 1 week. ? · You do not get better as expected. Where can you learn more? Go to http://veronica-janene.info/. Enter [id number] in the search box to learn more about \"Neck Pain: Care Instructions. \"  Current as of: March 21, 2017  Content Version: 11.4  © 1736-9215 [employer]. Care instructions adapted under license by [employer] (which disclaims liability or warranty for this information). If you have questions about a medical condition or this instruction, always ask your healthcare professional. [place] [address] any warranty or liability for your use of this information. Spondylolysis and Spondylolisthesis: Exercises  Your Care Instructions  Here are some examples of typical rehabilitation exercises for your condition. Start each exercise slowly. Ease off the exercise if you start to have pain. Your doctor or physical therapist will tell you when you can start these exercises and which ones will work best for you. How to do the exercises  Single knee-to-chest    1. Lie on your back with your knees bent and your feet flat on the floor. You can put a small pillow under your head and neck if it is more comfortable. 2. Bring one knee to your chest, keeping the other foot flat on the floor. 3. Keep your lower back pressed to the floor. Hold for 15 to 30 seconds. 4. Relax, and lower the knee to the starting position. 5. Repeat with the other leg. Repeat 2 to 4 times with each leg. 6. To get more stretch, put your other leg flat on the floor while pulling your knee to your chest.  Double knee-to-chest    1. Lie on your back with your knees bent and your feet flat on the floor. You can put a small pillow under your head and neck if it is more comfortable. 2. Bring both knees to your chest.  3. Keep your lower back pressed to the floor. Hold for 15 to 30 seconds.   4. Relax, and lower your knees to the starting position. 5. Repeat 2 to 4 times. Alternate arm and leg (bird dog) exercise    Do this exercise slowly. Try to keep your body straight at all times. 1. Start on the floor, on your hands and knees. 2. Tighten your belly muscles by pulling your belly button in toward your spine. Be sure you continue to breathe normally and do not hold your breath. 3. Raise one arm off the floor, and hold it straight out in front of you. Be careful not to let your shoulder drop down, because that will twist your trunk. 4. Hold for about 6 seconds, then lower your arm and switch to your other arm. 5. Repeat 8 to 12 times on each arm. 6. When you can do this exercise with ease and no pain, repeat steps 1 through 5. But this time do it with one leg raised off the floor, holding your leg straight out behind you. Be careful not to let your hip drop down, because that will twist your trunk. 7. When holding your leg straight out becomes easier, try raising your opposite arm at the same time, and repeat steps 1 through 5. Bridging    1. Lie on your back with both knees bent. Your knees should be bent about 90 degrees. 2. Then push your feet into the floor, squeeze your buttocks, and lift your hips off the floor until your shoulders, hips, and knees are all in a straight line. 3. Hold for about 6 seconds as you continue to breathe normally, and then slowly lower your hips back down to the floor and rest for up to 10 seconds. 4. Repeat 8 to 12 times. Curl-ups    1. Lie on the floor on your back with your knees bent at a 90-degree angle. Your feet should be flat on the floor, about 12 inches from your buttocks. 2. Cross your arms over your chest. If this bothers your neck, try putting your hands behind your neck (not your head), with your elbows spread apart. 3. Slowly tighten your belly muscles and raise your shoulder blades off the floor.   4. Keep your head in line with your body, and do not press your chin to your chest.  5. Hold this position for 1 or 2 seconds, then slowly lower yourself back down to the floor. 6. Repeat 8 to 12 times. Plank    Do this exercise slowly. Try to keep your body straight at all times, and do not let one hip drop lower than the other. 1. Lie on your stomach, resting your upper body on your forearms. 2. Tighten your belly muscles by pulling your belly button in toward your spine. 3. Keeping your knees on the floor, press down with your forearms to lift your upper body off the floor. 4. Hold for about 6 seconds, then lower your body to the floor. Rest for up to 10 seconds. 5. Repeat 8 to 12 times. 6. Over time, work up to holding for 15 to 30 seconds each time. 7. If this exercise is easy to do with your knees on the floor, try doing this exercise with your knees and legs straight, supported by your toes on the floor. Follow-up care is a key part of your treatment and safety. Be sure to make and go to all appointments, and call your doctor if you are having problems. It's also a good idea to know your test results and keep a list of the medicines you take. Where can you learn more? Go to http://veronica-janene.info/. Enter [phone] in the search box to learn more about \"Spondylolysis and Spondylolisthesis: Exercises. \"  Current as of: March 21, 2017  Content Version: 11.4  © 0150-3897 [employer]. Care instructions adapted under license by [employer] (which disclaims liability or warranty for this information). If you have questions about a medical condition or this instruction, always ask your healthcare professional. [place] [address] any warranty or liability for your use of this information. Elevated Blood Pressure: Care Instructions  Your Care Instructions    Blood pressure is a measure of how hard the blood pushes against the walls of your arteries.  It's normal for blood pressure to go up and down throughout the day. But if it stays up over time, you have high blood pressure. Two numbers tell you your blood pressure. The first number is the systolic pressure. It shows how hard the blood pushes when your heart is pumping. The second number is the diastolic pressure. It shows how hard the blood pushes between heartbeats, when your heart is relaxed and filling with blood. An ideal blood pressure in adults is less than 120/80 (say \"120 over 80\"). High blood pressure is 140/90 or higher. You have high blood pressure if your top number is 140 or higher or your bottom number is 90 or higher, or both. The main test for high blood pressure is simple, fast, and painless. To diagnose high blood pressure, your doctor will test your blood pressure at different times. After testing your blood pressure, your doctor may ask you to test it again when you are home. If you are diagnosed with high blood pressure, you can work with your doctor to make a long-term plan to manage it. Follow-up care is a key part of your treatment and safety. Be sure to make and go to all appointments, and call your doctor if you are having problems. It's also a good idea to know your test results and keep a list of the medicines you take. How can you care for yourself at home? · Do not smoke. Smoking increases your risk for heart attack and stroke. If you need help quitting, talk to your doctor about stop-smoking programs and medicines. These can increase your chances of quitting for good. · Stay at a healthy weight. · Try to limit how much sodium you eat to less than 2,300 milligrams (mg) a day. Your doctor may ask you to try to eat less than 1,500 mg a day. · Be physically active. Get at least 30 minutes of exercise on most days of the week. Walking is a good choice. You also may want to do other activities, such as running, swimming, cycling, or playing tennis or team sports. · Avoid or limit alcohol.  Talk to your doctor about whether you can drink any alcohol. · Eat plenty of fruits, vegetables, and low-fat dairy products. Eat less saturated and total fats. · Learn how to check your blood pressure at home. When should you call for help? Call your doctor now or seek immediate medical care if:  ? · Your blood pressure is much higher than normal (such as 180/110 or higher). ? · You think high blood pressure is causing symptoms such as:  ¨ Severe headache. ¨ Blurry vision. ? Watch closely for changes in your health, and be sure to contact your doctor if:  ? · You do not get better as expected. Where can you learn more? Go to http://veronica-janene.info/. Enter H464 in the search box to learn more about \"Elevated Blood Pressure: Care Instructions. \"  Current as of: September 21, 2016  Content Version: 11.4  © 1181-1086 [employer]. Care instructions adapted under license by "[employer]" (which disclaims liability or warranty for this information). If you have questions about a medical condition or this instruction, always ask your healthcare professional. [address] any warranty or liability for your use of this information.

## 2018-05-05 NOTE — ED NOTES
[facility] has reviewed discharge instructions with the patient. The patient verbalized understanding. pt ambulatory home with family, discharge papers in hand.

## 2018-05-23 ENCOUNTER — APPOINTMENT (OUTPATIENT)
Dept: GENERAL RADIOLOGY | Age: 53
End: 2018-05-23
Attending: EMERGENCY MEDICINE
Payer: SELF-PAY

## 2018-05-23 ENCOUNTER — HOSPITAL ENCOUNTER (EMERGENCY)
Age: 53
Discharge: HOME | End: 2018-05-23
Attending: EMERGENCY MEDICINE
Payer: SELF-PAY

## 2018-05-23 VITALS
HEART RATE: 69 BPM | WEIGHT: 280 LBS | RESPIRATION RATE: 16 BRPM | OXYGEN SATURATION: 99 % | BODY MASS INDEX: 37.93 KG/M2 | HEIGHT: 72 IN | DIASTOLIC BLOOD PRESSURE: 69 MMHG | SYSTOLIC BLOOD PRESSURE: 114 MMHG | TEMPERATURE: 98.6 F

## 2018-05-23 DIAGNOSIS — R42 DIZZINESS: Primary | ICD-10-CM

## 2018-05-23 LAB
ALBUMIN SERPL-MCNC: 3.8 G/DL (ref 3.5–5)
ALBUMIN/GLOB SERPL: 0.9 {RATIO} (ref 1.1–2.2)
ALP SERPL-CCNC: 110 U/L (ref 45–117)
ALT SERPL-CCNC: 25 U/L (ref 12–78)
ANION GAP SERPL CALC-SCNC: 4 MMOL/L (ref 5–15)
APPEARANCE UR: CLEAR
AST SERPL-CCNC: 15 U/L (ref 15–37)
BACTERIA URNS QL MICRO: ABNORMAL /HPF
BASOPHILS # BLD: 0 K/UL (ref 0–0.1)
BASOPHILS NFR BLD: 1 % (ref 0–1)
BILIRUB SERPL-MCNC: 0.2 MG/DL (ref 0.2–1)
BILIRUB UR QL: NEGATIVE
BUN SERPL-MCNC: 12 MG/DL (ref 6–20)
BUN/CREAT SERPL: 17 (ref 12–20)
CALCIUM SERPL-MCNC: 9.1 MG/DL (ref 8.5–10.1)
CHLORIDE SERPL-SCNC: 106 MMOL/L (ref 97–108)
CK MB CFR SERPL CALC: 0.9 % (ref 0–2.5)
CK MB SERPL-MCNC: 1.8 NG/ML (ref 5–25)
CK SERPL-CCNC: 211 U/L (ref 26–192)
CO2 SERPL-SCNC: 30 MMOL/L (ref 21–32)
COLOR UR: ABNORMAL
CREAT SERPL-MCNC: 0.72 MG/DL (ref 0.55–1.02)
DIFFERENTIAL METHOD BLD: ABNORMAL
EOSINOPHIL # BLD: 0.3 K/UL (ref 0–0.4)
EOSINOPHIL NFR BLD: 4 % (ref 0–7)
EPITH CASTS URNS QL MICRO: ABNORMAL /LPF
ERYTHROCYTE [DISTWIDTH] IN BLOOD BY AUTOMATED COUNT: 13.8 % (ref 11.5–14.5)
GLOBULIN SER CALC-MCNC: 4.3 G/DL (ref 2–4)
GLUCOSE BLD STRIP.AUTO-MCNC: 85 MG/DL (ref 65–100)
GLUCOSE SERPL-MCNC: 81 MG/DL (ref 65–100)
GLUCOSE UR STRIP.AUTO-MCNC: NEGATIVE MG/DL
HCT VFR BLD AUTO: 37 % (ref 35–47)
HGB BLD-MCNC: 12.4 G/DL (ref 11.5–16)
HGB UR QL STRIP: NEGATIVE
HYALINE CASTS URNS QL MICRO: ABNORMAL /LPF (ref 0–5)
IMM GRANULOCYTES # BLD: 0 K/UL (ref 0–0.04)
IMM GRANULOCYTES NFR BLD AUTO: 0 % (ref 0–0.5)
KETONES UR QL STRIP.AUTO: NEGATIVE MG/DL
LEUKOCYTE ESTERASE UR QL STRIP.AUTO: NEGATIVE
LYMPHOCYTES # BLD: 3.4 K/UL (ref 0.8–3.5)
LYMPHOCYTES NFR BLD: 52 % (ref 12–49)
MCH RBC QN AUTO: 28.1 PG (ref 26–34)
MCHC RBC AUTO-ENTMCNC: 33.5 G/DL (ref 30–36.5)
MCV RBC AUTO: 83.7 FL (ref 80–99)
MONOCYTES # BLD: 0.6 K/UL (ref 0–1)
MONOCYTES NFR BLD: 9 % (ref 5–13)
NEUTS SEG # BLD: 2.2 K/UL (ref 1.8–8)
NEUTS SEG NFR BLD: 34 % (ref 32–75)
NITRITE UR QL STRIP.AUTO: NEGATIVE
NRBC # BLD: 0 K/UL (ref 0–0.01)
NRBC BLD-RTO: 0 PER 100 WBC
PH UR STRIP: 5.5 [PH] (ref 5–8)
PLATELET # BLD AUTO: 284 K/UL (ref 150–400)
PMV BLD AUTO: 9.3 FL (ref 8.9–12.9)
POTASSIUM SERPL-SCNC: 3.3 MMOL/L (ref 3.5–5.1)
PROT SERPL-MCNC: 8.1 G/DL (ref 6.4–8.2)
PROT UR STRIP-MCNC: NEGATIVE MG/DL
RBC # BLD AUTO: 4.42 M/UL (ref 3.8–5.2)
RBC #/AREA URNS HPF: ABNORMAL /HPF (ref 0–5)
SERVICE CMNT-IMP: NORMAL
SODIUM SERPL-SCNC: 140 MMOL/L (ref 136–145)
SP GR UR REFRACTOMETRY: 1.02 (ref 1–1.03)
TROPONIN I SERPL-MCNC: <0.04 NG/ML
UA: UC IF INDICATED,UAUC: ABNORMAL
UROBILINOGEN UR QL STRIP.AUTO: 1 EU/DL (ref 0.2–1)
WBC # BLD AUTO: 6.5 K/UL (ref 3.6–11)
WBC URNS QL MICRO: ABNORMAL /HPF (ref 0–4)

## 2018-05-23 PROCEDURE — 87086 URINE CULTURE/COLONY COUNT: CPT | Performed by: EMERGENCY MEDICINE

## 2018-05-23 PROCEDURE — 82962 GLUCOSE BLOOD TEST: CPT

## 2018-05-23 PROCEDURE — 85025 COMPLETE CBC W/AUTO DIFF WBC: CPT | Performed by: EMERGENCY MEDICINE

## 2018-05-23 PROCEDURE — 99285 EMERGENCY DEPT VISIT HI MDM: CPT

## 2018-05-23 PROCEDURE — 93005 ELECTROCARDIOGRAM TRACING: CPT

## 2018-05-23 PROCEDURE — 82550 ASSAY OF CK (CPK): CPT | Performed by: EMERGENCY MEDICINE

## 2018-05-23 PROCEDURE — 82553 CREATINE MB FRACTION: CPT | Performed by: EMERGENCY MEDICINE

## 2018-05-23 PROCEDURE — 36415 COLL VENOUS BLD VENIPUNCTURE: CPT | Performed by: EMERGENCY MEDICINE

## 2018-05-23 PROCEDURE — 84484 ASSAY OF TROPONIN QUANT: CPT | Performed by: EMERGENCY MEDICINE

## 2018-05-23 PROCEDURE — 80053 COMPREHEN METABOLIC PANEL: CPT | Performed by: EMERGENCY MEDICINE

## 2018-05-23 PROCEDURE — 81001 URINALYSIS AUTO W/SCOPE: CPT | Performed by: EMERGENCY MEDICINE

## 2018-05-23 PROCEDURE — 71045 X-RAY EXAM CHEST 1 VIEW: CPT

## 2018-05-23 NOTE — ED PROVIDER NOTES
EMERGENCY DEPARTMENT HISTORY AND PHYSICAL EXAM      Date: 5/23/2018  Patient Name: Meghan Torres    History of Presenting Illness     Chief Complaint   Patient presents with    Dizziness     Pt states she has been having dizziness and left arm pain off an on fro a few days. History Provided By: Patient    HPI: Meghan Torres, 46 y.o. female with PMHx significant for TIA, ulcers, anxiety, and arthritis, presents ambulatory to the ED with cc of worsening dizziness and lightheadedness that is exacerbated by ambulation \"for a few days\". Pt also reports having intermittent left arm pain, mild weakness in her legs, and \"tingling\" in her left ankle. She states that she would be at work \"stumbling\", and would feel like she would about to pass out. She denies having any falls and notes that while she has had anxiety attacks in the past, her current sxs do not feel like that. There are no other complaints, changes, or physical findings at this time. PCP: Casey Macias MD    Current Outpatient Prescriptions   Medication Sig Dispense Refill    oxyCODONE-acetaminophen (PERCOCET) 5-325 mg per tablet Take 1 Tab by mouth every six (6) hours as needed for Pain for up to 12 doses. Max Daily Amount: 4 Tabs. 12 Tab 0    omeprazole (PRILOSEC) 20 mg capsule Take 20 mg by mouth daily. Indications: gastroesophageal reflux disease      albuterol (PROVENTIL HFA, VENTOLIN HFA, PROAIR HFA) 90 mcg/actuation inhaler Take 1-2 Puffs by inhalation every four (4) hours as needed for Wheezing.  1 Inhaler 1       Past History     Past Medical History:  Past Medical History:   Diagnosis Date    Acute bronchitis Jan 2015    Acute sinusitis     Anxiety     Arthritis     Back pain     Cocaine abuse with cocaine-induced disorder (HCC)     Delivery normal     x4    Dizziness     Gastrointestinal disorder     \"stomach ulcer\"    Other ill-defined conditions(799.89)     anemia    Palpitations     Peptic ulcer disease     Sciatica  TIA (transient ischemic attack)     Upper respiratory infection        Past Surgical History:  Past Surgical History:   Procedure Laterality Date    ABDOMEN SURGERY PROC UNLISTED      ulcer    HX GYN      tubal ligation    HX HEENT      sinus surgery    HX TONSILLECTOMY      HX TONSILLECTOMY         Family History:  Family History   Problem Relation Age of Onset    Stroke Mother     Heart Disease Mother        Social History:  Social History   Substance Use Topics    Smoking status: Former Smoker     Packs/day: 0.50     Years: 15.00     Quit date: 4/2/2016    Smokeless tobacco: Never Used    Alcohol use No       Allergies: Allergies   Allergen Reactions    Penicillins Anaphylaxis     Has taken cephalexin without problem    Hydrocodone Itching         Review of Systems   Review of Systems   Constitutional: Negative for chills and fever. HENT: Negative for congestion and sore throat. Eyes: Negative for visual disturbance. Respiratory: Negative for cough and shortness of breath. Cardiovascular: Negative for chest pain and leg swelling. Gastrointestinal: Negative for abdominal pain, blood in stool, diarrhea and nausea. Endocrine: Negative for polyuria. Genitourinary: Negative for dysuria, flank pain, vaginal bleeding and vaginal discharge. Musculoskeletal: Negative for myalgias. Skin: Negative for rash. Allergic/Immunologic: Negative for immunocompromised state. Neurological: Positive for dizziness, weakness (legs), light-headedness and numbness (tingling in left ankle). Negative for headaches. Psychiatric/Behavioral: Negative for confusion. All other systems reviewed and are negative. Physical Exam   Physical Exam   Constitutional: She is oriented to person, place, and time. She appears well-developed and well-nourished. HENT:   Head: Normocephalic and atraumatic.    Mouth/Throat: Oropharynx is clear and moist.   Eyes: Conjunctivae and EOM are normal. Pupils are equal, round, and reactive to light. Neck: Normal range of motion. Neck supple. No tracheal deviation present. Cardiovascular: Normal rate, regular rhythm, normal heart sounds and intact distal pulses. No murmur heard. Pulmonary/Chest: Effort normal and breath sounds normal. No respiratory distress. She has no wheezes. She has no rales. Abdominal: Soft. Bowel sounds are normal. There is no tenderness. There is no rebound and no guarding. Musculoskeletal: She exhibits no edema or deformity. Neurological: She is alert and oriented to person, place, and time. GCS eye subscore is 4. GCS verbal subscore is 5. GCS motor subscore is 6. EOMI intact, no facial droop or asymmetry, normal/equal sensation in face. Uvula elevates at midline, no tongue deviation. Normal strength with head rotation and shoulder shrug. 5/5 Strength in the bilateral upper and lower extremities, no pronotor drift, normal finger to nose. No truncal ataxia. Normal speech: no dysarthria or aphasia. Skin: Skin is warm and dry. Psychiatric: She has a normal mood and affect. Her speech is normal.   Nursing note and vitals reviewed.         Diagnostic Study Results     Labs -     Recent Results (from the past 12 hour(s))   GLUCOSE, POC    Collection Time: 05/23/18  6:07 PM   Result Value Ref Range    Glucose (POC) 85 65 - 100 mg/dL    Performed by [facility])    EKG, 12 LEAD, INITIAL    Collection Time: 05/23/18  6:09 PM   Result Value Ref Range    Ventricular Rate 77 BPM    Atrial Rate 77 BPM    P-R Interval 150 ms    QRS Duration 86 ms    Q-T Interval 390 ms    QTC Calculation (Bezet) 441 ms    Calculated P Axis 34 degrees    Calculated R Axis -19 degrees    Calculated T Axis 23 degrees    Diagnosis       Normal sinus rhythm  Cannot rule out Anterior infarct (cited on or before 23-MAY-2018)  Abnormal ECG  When compared with ECG of 12-DEC-2017 13:17,  No significant change was found     CBC WITH AUTOMATED DIFF    Collection Time: 05/23/18  6:11 PM   Result Value Ref Range    WBC 6.5 3.6 - 11.0 K/uL    RBC 4.42 3.80 - 5.20 M/uL    HGB 12.4 11.5 - 16.0 g/dL    HCT 37.0 35.0 - 47.0 %    MCV 83.7 80.0 - 99.0 FL    MCH 28.1 26.0 - 34.0 PG    MCHC 33.5 30.0 - 36.5 g/dL    RDW 13.8 11.5 - 14.5 %    PLATELET 424 187 - 679 K/uL    MPV 9.3 8.9 - 12.9 FL    NRBC 0.0 0  WBC    ABSOLUTE NRBC 0.00 0.00 - 0.01 K/uL    NEUTROPHILS 34 32 - 75 %    LYMPHOCYTES 52 (H) 12 - 49 %    MONOCYTES 9 5 - 13 %    EOSINOPHILS 4 0 - 7 %    BASOPHILS 1 0 - 1 %    IMMATURE GRANULOCYTES 0 0.0 - 0.5 %    ABS. NEUTROPHILS 2.2 1.8 - 8.0 K/UL    ABS. LYMPHOCYTES 3.4 0.8 - 3.5 K/UL    ABS. MONOCYTES 0.6 0.0 - 1.0 K/UL    ABS. EOSINOPHILS 0.3 0.0 - 0.4 K/UL    ABS. BASOPHILS 0.0 0.0 - 0.1 K/UL    ABS. IMM. GRANS. 0.0 0.00 - 0.04 K/UL    DF AUTOMATED     METABOLIC PANEL, COMPREHENSIVE    Collection Time: 05/23/18  6:11 PM   Result Value Ref Range    Sodium 140 136 - 145 mmol/L    Potassium 3.3 (L) 3.5 - 5.1 mmol/L    Chloride 106 97 - 108 mmol/L    CO2 30 21 - 32 mmol/L    Anion gap 4 (L) 5 - 15 mmol/L    Glucose 81 65 - 100 mg/dL    BUN 12 6 - 20 MG/DL    Creatinine 0.72 0.55 - 1.02 MG/DL    BUN/Creatinine ratio 17 12 - 20      GFR est AA >60 >60 ml/min/1.73m2    GFR est non-AA >60 >60 ml/min/1.73m2    Calcium 9.1 8.5 - 10.1 MG/DL    Bilirubin, total 0.2 0.2 - 1.0 MG/DL    ALT (SGPT) 25 12 - 78 U/L    AST (SGOT) 15 15 - 37 U/L    Alk. phosphatase 110 45 - 117 U/L    Protein, total 8.1 6.4 - 8.2 g/dL    Albumin 3.8 3.5 - 5.0 g/dL    Globulin 4.3 (H) 2.0 - 4.0 g/dL    A-G Ratio 0.9 (L) 1.1 - 2.2     CK W/ REFLX CKMB    Collection Time: 05/23/18  6:11 PM   Result Value Ref Range     (H) 26 - 192 U/L   TROPONIN I    Collection Time: 05/23/18  6:11 PM   Result Value Ref Range    Troponin-I, Qt. <0.04 <0.05 ng/mL   CK-MB,QUANT.     Collection Time: 05/23/18  6:11 PM   Result Value Ref Range    CK - MB 1.8 <3.6 NG/ML    CK-MB Index 0.9 0 - 2.5     URINALYSIS W/ REFLEX CULTURE    Collection Time: 05/23/18  7:20 PM   Result Value Ref Range    Color YELLOW/STRAW      Appearance CLEAR CLEAR      Specific gravity 1.018 1.003 - 1.030      pH (UA) 5.5 5.0 - 8.0      Protein NEGATIVE  NEG mg/dL    Glucose NEGATIVE  NEG mg/dL    Ketone NEGATIVE  NEG mg/dL    Bilirubin NEGATIVE  NEG      Blood NEGATIVE  NEG      Urobilinogen 1.0 0.2 - 1.0 EU/dL    Nitrites NEGATIVE  NEG      Leukocyte Esterase NEGATIVE  NEG      WBC 0-4 0 - 4 /hpf    RBC 0-5 0 - 5 /hpf    Epithelial cells FEW FEW /lpf    Bacteria 1+ (A) NEG /hpf    UA:UC IF INDICATED URINE CULTURE ORDERED (A) CNI      Hyaline cast 0-2 0 - 5 /lpf       Radiologic Studies -     CXR Results  (Last 48 hours)               05/23/18 1928  XR CHEST SNGL V Final result    Impression:  IMPRESSION:   1. No radiographic evidence of acute cardiopulmonary disease. Narrative:  INDICATION: . near syncope   Additional history: Dizziness and left arm pain intermittently times several   days. COMPARISON: Previous chest xray, 5/4/2018. LIMITATIONS: Portable technique. Apical lordotic angulation   . FINDINGS: Single frontal view of the chest.    .   Lines/tubes/surgical: R scrub Cardiac leads. Heart/mediastinum: Unremarkable. Lungs/pleura:  No focal consolidation or mass. No visualized pleural effusion or   pneumothorax. Additional Comments: None. .               Medical Decision Making   I am the first provider for this patient. I reviewed the vital signs, available nursing notes, past medical history, past surgical history, family history and social history. Vital Signs-Reviewed the patient's vital signs.   Patient Vitals for the past 12 hrs:   Temp Pulse Resp BP SpO2   05/23/18 2045 - 69 16 114/69 99 %   05/23/18 2030 - 67 15 130/77 96 %   05/23/18 2015 - 66 16 (!) 148/92 99 %   05/23/18 2000 - 68 17 (!) 126/111 98 %   05/23/18 1945 - 77 21 117/84 98 %   05/23/18 1930 - 67 13 123/79 96 %   05/23/18 1922 - 71 (!) 6 130/87 93 %   05/23/18 1921 - 79 20 - 98 %   05/23/18 1920 - 71 17 135/76 97 %   05/23/18 1919 - 72 - 122/89 -   05/23/18 1900 - 73 16 117/79 99 %   05/23/18 1845 - 69 17 127/86 97 %   05/23/18 1830 - 70 12 125/76 98 %   05/23/18 1816 - 83 17 (!) 150/96 99 %   05/23/18 1811 98.6 °F (37 °C) - 16 (!) 156/130 100 %       Pulse Oximetry Analysis - 100% on room air    Cardiac Monitor:   Rate: 71 bpm  Rhythm: Normal Sinus Rhythm 127/86     EKG interpretation: (Preliminary)  6:09 PM  Rhythm: normal sinus rhythm; and regular . Rate (approx.): 77; Axis: normal; VT interval: normal; QRS interval: normal at 86 ms; ST/T wave: normal at 390/441 ms; Other findings: abnormal ekg, no acute ischemic changes. Written by RALPH Harrisonibbyron, as dictated by Lila Cuevas DO. Records Reviewed: Nursing Notes and Old Medical Records    Provider Notes (Medical Decision Making):   Patient presenting with generalized weakness. DDx: infection, anemia, electrolyte anomoly (hypo or hyperkalemia, hypomagnesemia), hypothyroid, dehydration, depression, CA, ACS. Will obtain EKG, UA, labwork for any urgent/emergent pathology. ED Course:   Initial assessment performed. The patients presenting problems have been discussed, and they are in agreement with the care plan formulated and outlined with them. I have encouraged them to ask questions as they arise throughout their visit. 6:45 PM  Pt ambulated without difficulty, will perform orthostatics. Disposition:  DISCHARGE NOTE  8:58 PM  The patient has been re-evaluated and is ready for discharge. Reviewed available results with patient. Counseled patient on diagnosis and care plan. Patient has expressed understanding, and all questions have been answered. Patient agrees with plan and agrees to follow up as recommended, or return to the ED if their symptoms worsen.  Discharge instructions have been provided and explained to the patient, along with reasons to return to the ED.    PLAN:  1. Current Discharge Medication List        2. Follow-up Information     Follow up With Details Comments [address], [place] Call in 1 day  [address]  [phone]      [facility] EMERGENCY DEPT  If symptoms worsen [address] [place] [address]  [phone]        Return to ED if worse     Diagnosis     Clinical Impression:   1. Dizziness        Attestations: This note is prepared by Chuck Lazaro, acting as Scribe for Poornima Kothari DO. Poornima Kothari DO: The scribe's documentation has been prepared under my direction and personally reviewed by me in its entirety. I confirm that the note above accurately reflects all work, treatment, procedures, and medical decision making performed by me.

## 2018-05-24 LAB
ATRIAL RATE: 77 BPM
BACTERIA SPEC CULT: NORMAL
CALCULATED P AXIS, ECG09: 34 DEGREES
CALCULATED R AXIS, ECG10: -19 DEGREES
CALCULATED T AXIS, ECG11: 23 DEGREES
CC UR VC: NORMAL
DIAGNOSIS, 93000: NORMAL
P-R INTERVAL, ECG05: 150 MS
Q-T INTERVAL, ECG07: 390 MS
QRS DURATION, ECG06: 86 MS
QTC CALCULATION (BEZET), ECG08: 441 MS
SERVICE CMNT-IMP: NORMAL
VENTRICULAR RATE, ECG03: 77 BPM

## 2018-05-24 NOTE — DISCHARGE INSTRUCTIONS
Dizziness: Care Instructions  Your Care Instructions  Dizziness is the feeling of unsteadiness or fuzziness in your head. It is different than having vertigo, which is a feeling that the room is spinning or that you are moving or falling. It is also different from lightheadedness, which is the feeling that you are about to faint. It can be hard to know what causes dizziness. Some people feel dizzy when they have migraine headaches. Sometimes bouts of flu can make you feel dizzy. Some medical conditions, such as heart problems or high blood pressure, can make you feel dizzy. Many medicines can cause dizziness, including medicines for high blood pressure, pain, or anxiety. If a medicine causes your symptoms, your doctor may recommend that you stop or change the medicine. If it is a problem with your heart, you may need medicine to help your heart work better. If there is no clear reason for your symptoms, your doctor may suggest watching and waiting for a while to see if the dizziness goes away on its own. Follow-up care is a key part of your treatment and safety. Be sure to make and go to all appointments, and call your doctor if you are having problems. It's also a good idea to know your test results and keep a list of the medicines you take. How can you care for yourself at home? · If your doctor recommends or prescribes medicine, take it exactly as directed. Call your doctor if you think you are having a problem with your medicine. · Do not drive while you feel dizzy. · Try to prevent falls. Steps you can take include:  ¨ Using nonskid mats, adding grab bars near the tub, and using night-lights. ¨ Clearing your home so that walkways are free of anything you might trip on. ¨ Letting family and friends know that you have been feeling dizzy. This will help them know how to help you. When should you call for help? Call 911 anytime you think you may need emergency care.  For example, call if:  ? · You passed out (lost consciousness). ? · You have dizziness along with symptoms of a heart attack. These may include:  ¨ Chest pain or pressure, or a strange feeling in the chest.  ¨ Sweating. ¨ Shortness of breath. ¨ Nausea or vomiting. ¨ Pain, pressure, or a strange feeling in the back, neck, jaw, or upper belly or in one or both shoulders or arms. ¨ Lightheadedness or sudden weakness. ¨ A fast or irregular heartbeat. ? · You have symptoms of a stroke. These may include:  ¨ Sudden numbness, tingling, weakness, or loss of movement in your face, arm, or leg, especially on only one side of your body. ¨ Sudden vision changes. ¨ Sudden trouble speaking. ¨ Sudden confusion or trouble understanding simple statements. ¨ Sudden problems with walking or balance. ¨ A sudden, severe headache that is different from past headaches. ?Call your doctor now or seek immediate medical care if:  ? · You feel dizzy and have a fever, headache, or ringing in your ears. ? · You have new or increased nausea and vomiting. ? · Your dizziness does not go away or comes back. ? Watch closely for changes in your health, and be sure to contact your doctor if:  ? · You do not get better as expected. Where can you learn more? Go to http://veronica-janene.info/. Enter P122 in the search box to learn more about \"Dizziness: Care Instructions. \"  Current as of: March 20, 2017  Content Version: 11.4  © 6319-6442 [employer]. Care instructions adapted under license by [employer] (which disclaims liability or warranty for this information). If you have questions about a medical condition or this instruction, always ask your healthcare professional. [place] [address] any warranty or liability for your use of this information.

## 2018-05-24 NOTE — ED NOTES
Pia Vines DO reviewed discharge instructions with the patient. The patient verbalized understanding. Ambulatory on discharge.

## 2018-07-10 ENCOUNTER — HOSPITAL ENCOUNTER (EMERGENCY)
Age: 53
Discharge: HOME | End: 2018-07-11
Attending: EMERGENCY MEDICINE | Admitting: EMERGENCY MEDICINE
Payer: SELF-PAY

## 2018-07-10 VITALS
TEMPERATURE: 98.5 F | HEIGHT: 72 IN | RESPIRATION RATE: 18 BRPM | BODY MASS INDEX: 34.94 KG/M2 | SYSTOLIC BLOOD PRESSURE: 164 MMHG | WEIGHT: 257.94 LBS | OXYGEN SATURATION: 98 % | HEART RATE: 74 BPM | DIASTOLIC BLOOD PRESSURE: 92 MMHG

## 2018-07-10 DIAGNOSIS — R21 RASH OF HANDS: Primary | ICD-10-CM

## 2018-07-10 DIAGNOSIS — R03.0 ELEVATED BLOOD PRESSURE READING: ICD-10-CM

## 2018-07-10 PROCEDURE — 99283 EMERGENCY DEPT VISIT LOW MDM: CPT

## 2018-07-10 PROCEDURE — 74011636637 HC RX REV CODE- 636/637: Performed by: PHYSICIAN ASSISTANT

## 2018-07-10 PROCEDURE — 74011250637 HC RX REV CODE- 250/637: Performed by: PHYSICIAN ASSISTANT

## 2018-07-10 RX ORDER — HYDROXYZINE 25 MG/1
50 TABLET, FILM COATED ORAL
Status: COMPLETED | OUTPATIENT
Start: 2018-07-10 | End: 2018-07-10

## 2018-07-10 RX ORDER — ALPRAZOLAM 1 MG/1
1 TABLET ORAL
COMMUNITY

## 2018-07-10 RX ORDER — HYDROXYZINE 50 MG/1
50 TABLET, FILM COATED ORAL
Qty: 20 TAB | Refills: 0 | Status: SHIPPED | OUTPATIENT
Start: 2018-07-10 | End: 2018-07-20

## 2018-07-10 RX ORDER — PREDNISONE 10 MG/1
20 TABLET ORAL ONCE
Status: DISCONTINUED | OUTPATIENT
Start: 2018-07-10 | End: 2018-07-11

## 2018-07-10 RX ORDER — FAMOTIDINE 20 MG/1
20 TABLET, FILM COATED ORAL
Status: COMPLETED | OUTPATIENT
Start: 2018-07-10 | End: 2018-07-10

## 2018-07-10 RX ORDER — FAMOTIDINE 20 MG/1
20 TABLET, FILM COATED ORAL 2 TIMES DAILY
Qty: 20 TAB | Refills: 0 | Status: SHIPPED | OUTPATIENT
Start: 2018-07-10 | End: 2018-08-10

## 2018-07-10 RX ORDER — PREDNISONE 20 MG/1
20 TABLET ORAL
Qty: 20 TAB | Refills: 0 | Status: SHIPPED | OUTPATIENT
Start: 2018-07-10 | End: 2019-01-03

## 2018-07-10 RX ORDER — PREDNISONE 20 MG/1
60 TABLET ORAL
Status: COMPLETED | OUTPATIENT
Start: 2018-07-10 | End: 2018-07-10

## 2018-07-10 RX ADMIN — HYDROXYZINE HYDROCHLORIDE 50 MG: 25 TABLET, FILM COATED ORAL at 23:14

## 2018-07-10 RX ADMIN — PREDNISONE 60 MG: 20 TABLET ORAL at 23:14

## 2018-07-10 RX ADMIN — FAMOTIDINE 20 MG: 20 TABLET ORAL at 23:14

## 2018-07-10 NOTE — LETTER
[address] 
[facility] EMERGENCY DEPT 
[address] [place] [address] [facility] [address] [id number] [phone] Work/School Note Date: 7/10/2018 To Whom It May concern: 
 
Felisa Blank was seen and treated today in the emergency room by the following provider(s): 
Attending Provider: Alex Alfonso MD 
Physician Assistant: HAYDEE Dumont. Felisa Blank may return to work on 7/13/18 or sooner, if feeling better. Sincerely, HAYDEE Dumont

## 2018-07-11 NOTE — DISCHARGE INSTRUCTIONS
Elevated Blood Pressure: Care Instructions  Your Care Instructions    Blood pressure is a measure of how hard the blood pushes against the walls of your arteries. It's normal for blood pressure to go up and down throughout the day. But if it stays up over time, you have high blood pressure. Two numbers tell you your blood pressure. The first number is the systolic pressure. It shows how hard the blood pushes when your heart is pumping. The second number is the diastolic pressure. It shows how hard the blood pushes between heartbeats, when your heart is relaxed and filling with blood. An ideal blood pressure in adults is less than 120/80 (say \"120 over 80\"). High blood pressure is 140/90 or higher. You have high blood pressure if your top number is 140 or higher or your bottom number is 90 or higher, or both. The main test for high blood pressure is simple, fast, and painless. To diagnose high blood pressure, your doctor will test your blood pressure at different times. After testing your blood pressure, your doctor may ask you to test it again when you are home. If you are diagnosed with high blood pressure, you can work with your doctor to make a long-term plan to manage it. Follow-up care is a key part of your treatment and safety. Be sure to make and go to all appointments, and call your doctor if you are having problems. It's also a good idea to know your test results and keep a list of the medicines you take. How can you care for yourself at home? · Do not smoke. Smoking increases your risk for heart attack and stroke. If you need help quitting, talk to your doctor about stop-smoking programs and medicines. These can increase your chances of quitting for good. · Stay at a healthy weight. · Try to limit how much sodium you eat to less than 2,300 milligrams (mg) a day. Your doctor may ask you to try to eat less than 1,500 mg a day. · Be physically active.  Get at least 30 minutes of exercise on most days of the week. Walking is a good choice. You also may want to do other activities, such as running, swimming, cycling, or playing tennis or team sports. · Avoid or limit alcohol. Talk to your doctor about whether you can drink any alcohol. · Eat plenty of fruits, vegetables, and low-fat dairy products. Eat less saturated and total fats. · Learn how to check your blood pressure at home. When should you call for help? Call your doctor now or seek immediate medical care if:  ? · Your blood pressure is much higher than normal (such as 180/110 or higher). ? · You think high blood pressure is causing symptoms such as:  ¨ Severe headache. ¨ Blurry vision. ? Watch closely for changes in your health, and be sure to contact your doctor if:  ? · You do not get better as expected. Where can you learn more? Go to http://veronicaOligomerixjanene.info/. Enter J781 in the search box to learn more about \"Elevated Blood Pressure: Care Instructions. \"  Current as of: September 21, 2016  Content Version: 11.4  © 4915-3260 [employer]. Care instructions adapted under license by [employer] (which disclaims liability or warranty for this information). If you have questions about a medical condition or this instruction, always ask your healthcare professional. [address] any warranty or liability for your use of this information. Rash: Care Instructions  Your Care Instructions  A rash is any irritation or inflammation of the skin. Rashes have many possible causes, including allergy, infection, illness, heat, and emotional stress. Follow-up care is a key part of your treatment and safety. Be sure to make and go to all appointments, and call your doctor if you are having problems. It's also a good idea to know your test results and keep a list of the medicines you take. How can you care for yourself at home? · Wash the area with water only.  Soap can make dryness and itching worse. Pat dry. · Put cold, wet cloths on the rash to reduce itching. · Keep cool, and stay out of the sun. · Leave the rash open to the air as much of the time as possible. · Sometimes petroleum jelly (Vaseline) can help relieve the discomfort caused by a rash. A moisturizing lotion, such as Cetaphil, also may help. Calamine lotion may help for rashes caused by contact with something (such as a plant or soap) that irritated the skin. Use it 3 or 4 times a day. · If your doctor prescribed a cream, use it as directed. If your doctor prescribed medicine, take it exactly as directed. · If your rash itches so badly that it interferes with your normal activities, take an over-the-counter antihistamine, such as diphenhydramine (Benadryl) or loratadine (Claritin). Read and follow all instructions on the label. When should you call for help? Call your doctor now or seek immediate medical care if:    · You have signs of infection, such as:  ¨ Increased pain, swelling, warmth, or redness. ¨ Red streaks leading from the area. ¨ Pus draining from the area. ¨ A fever.     · You have joint pain along with the rash.    Watch closely for changes in your health, and be sure to contact your doctor if:    · Your rash is changing or getting worse. For example, call if you have pain along with the rash, the rash is spreading, or you have new blisters.     · You do not get better after 1 week. Where can you learn more? Go to http://veronica-janene.info/. Enter N332 in the search box to learn more about \"Rash: Care Instructions. \"  Current as of: October 5, 2017  Content Version: 11.7  © 3118-1989 [employer]. Care instructions adapted under license by [employer] (which disclaims liability or warranty for this information).  If you have questions about a medical condition or this instruction, always ask your healthcare professional. Teresa [place] disclaims any warranty or liability for your use of this information.

## 2018-07-11 NOTE — ED PROVIDER NOTES
EMERGENCY DEPARTMENT HISTORY AND PHYSICAL EXAM 
     
 
Date: 7/10/2018 Patient Name: Yovanny Rees History of Presenting Illness Chief Complaint Patient presents with  Rash Pt ambulatory to triage with closed, dry rash to L hand x 1 month; pt saw PCP and referred her dermatologist, pt cannot see until end of August  
 
 
History Provided By: Patient HPI: Yovanny Rees is a 46 y.o. female, who presents ambulatory  to the ED c/o intermittent rash to palm of left hand x one month. PCP prescribed ammonium lactate 12% lotion and referred pt  To dermatologist. Iva Benitez is next month. The rash impoved, but flared again today. 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 The rash itches and burns. Pt denies hx of DM. She specifically denies any recent fevers, chills, nausea, vomiting, diarrhea, abd pain, CP, SOB, urinary sxs, changes in BM, or headache. PCP: Kaye Briscoe MD 
 
 
There are no other complaints, changes, or physical findings at this time. Current Outpatient Prescriptions Medication Sig Dispense Refill  ALPRAZolam (XANAX) 1 mg tablet Take 1 mg by mouth three (3) times daily as needed for Anxiety.  oxyCODONE-acetaminophen (PERCOCET) 5-325 mg per tablet Take 1 Tab by mouth every six (6) hours as needed for Pain for up to 12 doses. Max Daily Amount: 4 Tabs. 12 Tab 0  
 omeprazole (PRILOSEC) 20 mg capsule Take 20 mg by mouth daily. Indications: gastroesophageal reflux disease  albuterol (PROVENTIL HFA, VENTOLIN HFA, PROAIR HFA) 90 mcg/actuation inhaler Take 1-2 Puffs by inhalation every four (4) hours as needed for Wheezing. 1 Inhaler 1 Past History Past Medical History: 
Past Medical History:  
Diagnosis Date  Acute bronchitis Jan 2015  Acute sinusitis  Anxiety  Arthritis  Back pain  Cocaine abuse with cocaine-induced disorder ([facility] [address]  Delivery normal   
 x4  
 Dizziness  Gastrointestinal disorder \"stomach ulcer\"  Other ill-defined conditions(799.89)   
 anemia  Palpitations  Peptic ulcer disease  Sciatica  TIA (transient ischemic attack)  Upper respiratory infection Past Surgical History: 
Past Surgical History:  
Procedure Laterality Date  ABDOMEN SURGERY PROC UNLISTED    
 ulcer  HX GYN    
 tubal ligation  HX HEENT    
 sinus surgery  HX TONSILLECTOMY  HX TONSILLECTOMY Family History: 
Family History Problem Relation Age of Onset  Stroke Mother  Heart Disease Mother Social History: 
Social History Substance Use Topics  Smoking status: Former Smoker Packs/day: 0.50 Years: 15.00 Quit date: 4/2/2016  Smokeless tobacco: Never Used  Alcohol use No  
 
 
Allergies: Allergies Allergen Reactions  Penicillins Anaphylaxis Has taken cephalexin without problem  Hydrocodone Itching Review of Systems Review of Systems Constitutional: Negative for activity change, appetite change, fatigue and fever. HENT: Negative for congestion, dental problem, ear pain, rhinorrhea and sinus pressure. Eyes: Negative for photophobia, pain, discharge, redness, itching and visual disturbance. Respiratory: Negative for cough, chest tightness, shortness of breath, wheezing and stridor. Cardiovascular: Negative for chest pain and leg swelling. Gastrointestinal: Negative for abdominal distention, abdominal pain and blood in stool. Genitourinary: Negative for difficulty urinating, dysuria, flank pain, frequency, vaginal bleeding, vaginal discharge and vaginal pain. Musculoskeletal: Negative for arthralgias, back pain, gait problem, joint swelling and neck pain. Skin: Positive for rash. Negative for wound. Neurological: Negative for dizziness, syncope, weakness, numbness and headaches. Psychiatric/Behavioral: Negative for behavioral problems. The patient is not nervous/anxious.    
 
 
Physical Exam  
Physical Exam  
Constitutional: She is oriented to person, place, and time. She appears well-developed and well-nourished. No distress. HENT:  
Head: Normocephalic and atraumatic. Right Ear: External ear normal.  
Left Ear: External ear normal.  
Nose: Nose normal.  
Mouth/Throat: Oropharynx is clear and moist. No oropharyngeal exudate. Missing all teeth. No ulcerations of hard/soft palate. Eyes: Conjunctivae and EOM are normal. Pupils are equal, round, and reactive to light. Right eye exhibits no discharge. Left eye exhibits no discharge. No scleral icterus. Neck: Normal range of motion. Neck supple. No tracheal deviation present. Cardiovascular: Normal rate, regular rhythm, normal heart sounds and intact distal pulses. Exam reveals no gallop and no friction rub. No murmur heard. Pulmonary/Chest: Effort normal and breath sounds normal. No respiratory distress. She has no wheezes. She has no rales. She exhibits no tenderness. Abdominal: Bowel sounds are normal.  
Musculoskeletal: She exhibits no edema or tenderness. Lymphadenopathy:  
  She has no cervical adenopathy. Neurological: She is alert and oriented to person, place, and time. No cranial nerve deficit. Skin: Skin is warm and dry. No rash noted. No erythema. Dry, cracked, flaking rash to the palm of the left hand along the crease of the thenar eminence. No pustules. Psychiatric: She has a normal mood and affect. Her behavior is normal.  
Nursing note and vitals reviewed. Diagnostic Study Results Labs - No results found for this or any previous visit (from the past 12 hour(s)). Radiologic Studies - No orders to display CT Results  (Last 48 hours) None CXR Results  (Last 48 hours) None Medical Decision Making I am the first provider for this patient. I reviewed the vital signs, available nursing notes, past medical history, past surgical history, family history and social history.  
 
Vital Signs-Reviewed the patient's vital signs. Patient Vitals for the past 12 hrs: 
 Temp Pulse Resp BP SpO2  
07/10/18 2108 98.5 °F (36.9 °C) 74 18 (!) 164/92 98 % Records Reviewed: Nursing Notes and Old Medical Records Provider Notes (Medical Decision Making): DDx: rash, hand foot and mouth, allergic rxn,  
 
ED Course:  
Initial assessment performed. The patients presenting problems have been discussed, and they are in agreement with the care plan formulated and outlined with them. I have encouraged them to ask questions as they arise throughout their visit. radually decreasing the number of cigarettes smoked a day. JERRY Pires HYPERTENSION COUNSELING:  
Patient denies any current chest pain, headache, shortness of breath, lightheadedness, dizziness, or changes in vision. Patient is made aware of their elevated blood pressure and is instructed to follow up this week with their Primary Care for a recheck. Patient is counseled regarding consequences of chronic, uncontrolled hypertension including kidney disease, heart disease, stroke or even death. Patient verbally states their understanding. JERRY Pires PROGRESS NOTE: 
11:05 PM 
 
Pt noted to be feeling better , ready for discharge. Will follow up as instructed. All questions have been answered, pt voiced understanding and agreement with plan. Specific return precautions provided as well as instructions to return to the ED should sx worsen at any time. Vital signs stable for discharge. JERRY Pires Disposition: 
 
DISCHARGE NOTE: 
11:05 PM 
The patient's results have been reviewed with family and/or caregiver. They verbally convey their understanding and agreement of the patient's signs, symptoms, diagnosis, treatment, and prognosis. They additionally agree to follow up as recommended in the discharge instructions or to return to the Emergency Room should the patient's condition change prior to their follow-up appointment.  The family and/or caregiver verbally agrees with the care-plan and all of their questions have been answered. The discharge instructions have also been provided to the them along with educational information regarding the patient's diagnosis and a list of reasons why the patient would want to return to the ER prior to their follow-up appointment should their condition change. JERRY Ashley PLAN: 
1. Current Discharge Medication List  
  
START taking these medications Details  
famotidine (PEPCID) 20 mg tablet Take 1 Tab by mouth two (2) times a day. Qty: 20 Tab, Refills: 0  
  
hydrOXYzine HCl (ATARAX) 50 mg tablet Take 1 Tab by mouth every six (6) hours as needed for Itching for up to 10 days. Qty: 20 Tab, Refills: 0  
  
predniSONE (DELTASONE) 20 mg tablet Take 1 Tab by mouth daily as needed. With Breakfast 
Qty: 20 Tab, Refills: 0  
  
  
 
2. Follow-up Information Follow up With Details Comments Contact Info Janette Roy MD   [address] [place] [id number] [phone] Your dermatologist  as scheduled [facility] EMERGENCY DEPT  If symptoms worsen [address] [facility] 
[phone] Return to ED if worse Diagnosis Clinical Impression: 1. Rash of hands 2. Elevated blood pressure reading Not viewable in [facility]

## 2018-08-10 ENCOUNTER — HOSPITAL ENCOUNTER (EMERGENCY)
Age: 53
Discharge: HOME | End: 2018-08-10
Attending: EMERGENCY MEDICINE
Payer: SELF-PAY

## 2018-08-10 ENCOUNTER — APPOINTMENT (OUTPATIENT)
Dept: GENERAL RADIOLOGY | Age: 53
End: 2018-08-10
Attending: EMERGENCY MEDICINE
Payer: SELF-PAY

## 2018-08-10 VITALS
SYSTOLIC BLOOD PRESSURE: 117 MMHG | HEART RATE: 65 BPM | WEIGHT: 250 LBS | DIASTOLIC BLOOD PRESSURE: 63 MMHG | TEMPERATURE: 98.6 F | RESPIRATION RATE: 13 BRPM | OXYGEN SATURATION: 99 % | BODY MASS INDEX: 33.91 KG/M2

## 2018-08-10 DIAGNOSIS — R07.9 CHEST PAIN, UNSPECIFIED TYPE: Primary | ICD-10-CM

## 2018-08-10 LAB
ALBUMIN SERPL-MCNC: 3.9 G/DL (ref 3.5–5)
ALBUMIN/GLOB SERPL: 0.9 {RATIO} (ref 1.1–2.2)
ALP SERPL-CCNC: 114 U/L (ref 45–117)
ALT SERPL-CCNC: 24 U/L (ref 12–78)
ANION GAP SERPL CALC-SCNC: 8 MMOL/L (ref 5–15)
AST SERPL-CCNC: 17 U/L (ref 15–37)
BASOPHILS # BLD: 0.1 K/UL (ref 0–0.1)
BASOPHILS NFR BLD: 1 % (ref 0–1)
BILIRUB SERPL-MCNC: 0.3 MG/DL (ref 0.2–1)
BUN SERPL-MCNC: 10 MG/DL (ref 6–20)
BUN/CREAT SERPL: 13 (ref 12–20)
CALCIUM SERPL-MCNC: 9 MG/DL (ref 8.5–10.1)
CHLORIDE SERPL-SCNC: 106 MMOL/L (ref 97–108)
CK SERPL-CCNC: 182 U/L (ref 26–192)
CO2 SERPL-SCNC: 27 MMOL/L (ref 21–32)
CREAT SERPL-MCNC: 0.79 MG/DL (ref 0.55–1.02)
DIFFERENTIAL METHOD BLD: ABNORMAL
EOSINOPHIL # BLD: 0.2 K/UL (ref 0–0.4)
EOSINOPHIL NFR BLD: 3 % (ref 0–7)
ERYTHROCYTE [DISTWIDTH] IN BLOOD BY AUTOMATED COUNT: 13.4 % (ref 11.5–14.5)
GLOBULIN SER CALC-MCNC: 4.5 G/DL (ref 2–4)
GLUCOSE SERPL-MCNC: 86 MG/DL (ref 65–100)
HCT VFR BLD AUTO: 38.9 % (ref 35–47)
HGB BLD-MCNC: 12.7 G/DL (ref 11.5–16)
IMM GRANULOCYTES # BLD: 0 K/UL (ref 0–0.04)
IMM GRANULOCYTES NFR BLD AUTO: 0 % (ref 0–0.5)
LIPASE SERPL-CCNC: 133 U/L (ref 73–393)
LYMPHOCYTES # BLD: 3.1 K/UL (ref 0.8–3.5)
LYMPHOCYTES NFR BLD: 51 % (ref 12–49)
MCH RBC QN AUTO: 27.7 PG (ref 26–34)
MCHC RBC AUTO-ENTMCNC: 32.6 G/DL (ref 30–36.5)
MCV RBC AUTO: 84.7 FL (ref 80–99)
MONOCYTES # BLD: 0.5 K/UL (ref 0–1)
MONOCYTES NFR BLD: 8 % (ref 5–13)
NEUTS SEG # BLD: 2.2 K/UL (ref 1.8–8)
NEUTS SEG NFR BLD: 37 % (ref 32–75)
NRBC # BLD: 0 K/UL (ref 0–0.01)
NRBC BLD-RTO: 0 PER 100 WBC
PLATELET # BLD AUTO: 304 K/UL (ref 150–400)
PMV BLD AUTO: 9.1 FL (ref 8.9–12.9)
POTASSIUM SERPL-SCNC: 3.4 MMOL/L (ref 3.5–5.1)
PROT SERPL-MCNC: 8.4 G/DL (ref 6.4–8.2)
RBC # BLD AUTO: 4.59 M/UL (ref 3.8–5.2)
SODIUM SERPL-SCNC: 141 MMOL/L (ref 136–145)
TROPONIN I SERPL-MCNC: <0.05 NG/ML
WBC # BLD AUTO: 6.1 K/UL (ref 3.6–11)

## 2018-08-10 PROCEDURE — 93005 ELECTROCARDIOGRAM TRACING: CPT

## 2018-08-10 PROCEDURE — 74011000250 HC RX REV CODE- 250: Performed by: EMERGENCY MEDICINE

## 2018-08-10 PROCEDURE — 84484 ASSAY OF TROPONIN QUANT: CPT | Performed by: EMERGENCY MEDICINE

## 2018-08-10 PROCEDURE — 85025 COMPLETE CBC W/AUTO DIFF WBC: CPT | Performed by: EMERGENCY MEDICINE

## 2018-08-10 PROCEDURE — 99285 EMERGENCY DEPT VISIT HI MDM: CPT

## 2018-08-10 PROCEDURE — 80053 COMPREHEN METABOLIC PANEL: CPT | Performed by: EMERGENCY MEDICINE

## 2018-08-10 PROCEDURE — 74011250637 HC RX REV CODE- 250/637: Performed by: EMERGENCY MEDICINE

## 2018-08-10 PROCEDURE — 82550 ASSAY OF CK (CPK): CPT | Performed by: EMERGENCY MEDICINE

## 2018-08-10 PROCEDURE — 71046 X-RAY EXAM CHEST 2 VIEWS: CPT

## 2018-08-10 PROCEDURE — 83690 ASSAY OF LIPASE: CPT | Performed by: EMERGENCY MEDICINE

## 2018-08-10 PROCEDURE — 36415 COLL VENOUS BLD VENIPUNCTURE: CPT | Performed by: EMERGENCY MEDICINE

## 2018-08-10 RX ORDER — PANTOPRAZOLE SODIUM 40 MG/1
40 TABLET, DELAYED RELEASE ORAL DAILY
Qty: 30 TAB | Refills: 0 | Status: SHIPPED | OUTPATIENT
Start: 2018-08-10 | End: 2018-09-09

## 2018-08-10 RX ADMIN — LIDOCAINE HYDROCHLORIDE 40 ML: 20 SOLUTION ORAL; TOPICAL at 20:39

## 2018-08-11 LAB
ATRIAL RATE: 77 BPM
CALCULATED P AXIS, ECG09: 25 DEGREES
CALCULATED R AXIS, ECG10: -4 DEGREES
CALCULATED T AXIS, ECG11: 20 DEGREES
DIAGNOSIS, 93000: NORMAL
P-R INTERVAL, ECG05: 172 MS
Q-T INTERVAL, ECG07: 394 MS
QRS DURATION, ECG06: 86 MS
QTC CALCULATION (BEZET), ECG08: 445 MS
VENTRICULAR RATE, ECG03: 77 BPM

## 2018-08-11 NOTE — ED PROVIDER NOTES
EMERGENCY DEPARTMENT HISTORY AND PHYSICAL EXAM      Date: 8/10/2018  Patient Name: Paulina Mcginnis    History of Presenting Illness     Chief Complaint   Patient presents with    Chest Pain     since 0730 this morning with diarrhea and dizziness. History Provided By: Patient    HPI: Paulina Mcginnis, 46 y.o. female with PMHx significant for TIA, sciatica, acute bronchitis, anemia, stomach ulcer, URI, PUD, sinusitis, PUD, presents ambulatory to the ED with cc of intermittent shooting L abdominal pain since 0700 today. Pain has never completely gone away. No ripping or tearing in back, no pleurisy or SOB. Pt reports 5-6 BMs today but reports that the last few were diarrhea. Pt reports that she had a cardiac evaluation with stress test a year ago that was normal.. Pt discloses that she has not eaten today. Pt denies any hx of heart problems, DM, or blood clots. Pt specifically denies any HA, SOB, urinary sxs, nausea, or vomiting. There are no other complaints, changes, or physical findings at this time. PCP: Geneva Mcgrath MD    Current Outpatient Prescriptions   Medication Sig Dispense Refill    pantoprazole (PROTONIX) 40 mg tablet Take 1 Tab by mouth daily for 30 doses. 30 Tab 0    ALPRAZolam (XANAX) 1 mg tablet Take 1 mg by mouth three (3) times daily as needed for Anxiety.  predniSONE (DELTASONE) 20 mg tablet Take 1 Tab by mouth daily as needed. With Breakfast 20 Tab 0    oxyCODONE-acetaminophen (PERCOCET) 5-325 mg per tablet Take 1 Tab by mouth every six (6) hours as needed for Pain for up to 12 doses. Max Daily Amount: 4 Tabs. 12 Tab 0    albuterol (PROVENTIL HFA, VENTOLIN HFA, PROAIR HFA) 90 mcg/actuation inhaler Take 1-2 Puffs by inhalation every four (4) hours as needed for Wheezing.  1 Inhaler 1       Past History     Past Medical History:  Past Medical History:   Diagnosis Date    Acute bronchitis Jan 2015    Acute sinusitis     Anxiety     Arthritis     Back pain     Cocaine abuse with cocaine-induced disorder ([facility] [address]     Delivery normal     x4    Dizziness     Gastrointestinal disorder     \"stomach ulcer\"    Other ill-defined conditions(799.89)     anemia    Palpitations     Peptic ulcer disease     Sciatica     TIA (transient ischemic attack)     Upper respiratory infection        Past Surgical History:  Past Surgical History:   Procedure Laterality Date    ABDOMEN SURGERY PROC UNLISTED      ulcer    HX GYN      tubal ligation    HX HEENT      sinus surgery    HX TONSILLECTOMY      HX TONSILLECTOMY         Family History:  Family History   Problem Relation Age of Onset    Stroke Mother     Heart Disease Mother        Social History:  Social History   Substance Use Topics    Smoking status: Former Smoker     Packs/day: 0.50     Years: 15.00     Quit date: 4/2/2016    Smokeless tobacco: Never Used    Alcohol use No       Allergies: Allergies   Allergen Reactions    Penicillins Anaphylaxis     Has taken cephalexin without problem    Hydrocodone Itching         Review of Systems   Review of Systems   Constitutional: Negative. Negative for chills and fever. HENT: Negative. Negative for congestion and rhinorrhea. Respiratory: Negative. Negative for cough, chest tightness and wheezing. Cardiovascular: Negative for chest pain and palpitations. Gastrointestinal: Positive for abdominal pain and diarrhea. Negative for constipation, nausea and vomiting. Endocrine: Negative. Genitourinary: Negative. Negative for decreased urine volume, flank pain, hematuria and pelvic pain. Musculoskeletal: Negative. Negative for back pain and neck pain. Skin: Negative. Negative for color change, pallor and rash. Neurological: Positive for dizziness and numbness (L arm). Negative for seizures, weakness and headaches. Hematological: Negative. Negative for adenopathy. Psychiatric/Behavioral: Negative. All other systems reviewed and are negative.       Physical Exam Physical Exam   Constitutional: She is oriented to person, place, and time. She appears well-developed and well-nourished. No distress. HENT:   Head: Normocephalic and atraumatic. Mouth/Throat: No oropharyngeal exudate. Eyes: Conjunctivae are normal. Pupils are equal, round, and reactive to light. Right eye exhibits no discharge. Left eye exhibits no discharge. No scleral icterus. Neck: Normal range of motion. Neck supple. No JVD present. Cardiovascular: Normal rate, regular rhythm, normal heart sounds and intact distal pulses. Exam reveals no gallop and no friction rub. No murmur heard. Pulmonary/Chest: Effort normal and breath sounds normal. No stridor. No respiratory distress. She has no wheezes. She has no rales. She exhibits no tenderness. Abdominal: Soft. Normal aorta and bowel sounds are normal. She exhibits no distension, no abdominal bruit, no pulsatile midline mass and no mass. There is no hepatosplenomegaly. There is tenderness in the epigastric area and left upper quadrant. There is no rebound, no guarding and no CVA tenderness. No hernia. Neurological: She is alert and oriented to person, place, and time. She displays normal reflexes. No cranial nerve deficit. She exhibits normal muscle tone. Coordination normal.   Skin: Skin is warm. No petechiae, no purpura and no rash noted. Rash is not vesicular. She is not diaphoretic. No pallor. Vitals reviewed.       Diagnostic Study Results     Labs -     Recent Results (from the past 12 hour(s))   EKG, 12 LEAD, INITIAL    Collection Time: 08/10/18  6:28 PM   Result Value Ref Range    Ventricular Rate 77 BPM    Atrial Rate 77 BPM    P-R Interval 172 ms    QRS Duration 86 ms    Q-T Interval 394 ms    QTC Calculation (Bezet) 445 ms    Calculated P Axis 25 degrees    Calculated R Axis -4 degrees    Calculated T Axis 20 degrees    Diagnosis       Normal sinus rhythm  Normal ECG  When compared with ECG of 23-MAY-2018 18:09,  No significant change was found     CBC WITH AUTOMATED DIFF    Collection Time: 08/10/18  7:45 PM   Result Value Ref Range    WBC 6.1 3.6 - 11.0 K/uL    RBC 4.59 3.80 - 5.20 M/uL    HGB 12.7 11.5 - 16.0 g/dL    HCT 38.9 35.0 - 47.0 %    MCV 84.7 80.0 - 99.0 FL    MCH 27.7 26.0 - 34.0 PG    MCHC 32.6 30.0 - 36.5 g/dL    RDW 13.4 11.5 - 14.5 %    PLATELET 446 266 - 585 K/uL    MPV 9.1 8.9 - 12.9 FL    NRBC 0.0 0  WBC    ABSOLUTE NRBC 0.00 0.00 - 0.01 K/uL    NEUTROPHILS 37 32 - 75 %    LYMPHOCYTES 51 (H) 12 - 49 %    MONOCYTES 8 5 - 13 %    EOSINOPHILS 3 0 - 7 %    BASOPHILS 1 0 - 1 %    IMMATURE GRANULOCYTES 0 0.0 - 0.5 %    ABS. NEUTROPHILS 2.2 1.8 - 8.0 K/UL    ABS. LYMPHOCYTES 3.1 0.8 - 3.5 K/UL    ABS. MONOCYTES 0.5 0.0 - 1.0 K/UL    ABS. EOSINOPHILS 0.2 0.0 - 0.4 K/UL    ABS. BASOPHILS 0.1 0.0 - 0.1 K/UL    ABS. IMM. GRANS. 0.0 0.00 - 0.04 K/UL    DF AUTOMATED     METABOLIC PANEL, COMPREHENSIVE    Collection Time: 08/10/18  7:45 PM   Result Value Ref Range    Sodium 141 136 - 145 mmol/L    Potassium 3.4 (L) 3.5 - 5.1 mmol/L    Chloride 106 97 - 108 mmol/L    CO2 27 21 - 32 mmol/L    Anion gap 8 5 - 15 mmol/L    Glucose 86 65 - 100 mg/dL    BUN 10 6 - 20 MG/DL    Creatinine 0.79 0.55 - 1.02 MG/DL    BUN/Creatinine ratio 13 12 - 20      GFR est AA >60 >60 ml/min/1.73m2    GFR est non-AA >60 >60 ml/min/1.73m2    Calcium 9.0 8.5 - 10.1 MG/DL    Bilirubin, total 0.3 0.2 - 1.0 MG/DL    ALT (SGPT) 24 12 - 78 U/L    AST (SGOT) 17 15 - 37 U/L    Alk.  phosphatase 114 45 - 117 U/L    Protein, total 8.4 (H) 6.4 - 8.2 g/dL    Albumin 3.9 3.5 - 5.0 g/dL    Globulin 4.5 (H) 2.0 - 4.0 g/dL    A-G Ratio 0.9 (L) 1.1 - 2.2     TROPONIN I    Collection Time: 08/10/18  7:45 PM   Result Value Ref Range    Troponin-I, Qt. <0.05 <0.05 ng/mL   CK W/ REFLX CKMB    Collection Time: 08/10/18  7:45 PM   Result Value Ref Range     26 - 192 U/L   LIPASE    Collection Time: 08/10/18  7:45 PM   Result Value Ref Range    Lipase 133 73 - 393 U/L Radiologic Studies -   XR CHEST PA LAT   Final Result        CT Results  (Last 48 hours)    None        CXR Results  (Last 48 hours)               08/10/18 1923  XR CHEST PA LAT Final result    Impression:  IMPRESSION:   No acute process. Narrative:  INDICATION:   cp since this morning with diarrhea and dizziness       COMPARISON: May 23, 2018       FINDINGS:       Frontal and lateral views of the chest demonstrate a normal cardiomediastinal   silhouette. The lungs are adequately expanded. There is no edema, effusion,   consolidation, or pneumothorax. The osseous structures are unremarkable. Medical Decision Making   I am the first provider for this patient. I reviewed the vital signs, available nursing notes, past medical history, past surgical history, family history and social history. Vital Signs-Reviewed the patient's vital signs. Patient Vitals for the past 12 hrs:   Temp Pulse Resp BP SpO2   08/10/18 1832 98.6 °F (37 °C) 77 12 177/90 100 %       Pulse Oximetry Analysis - 98% on RA    Cardiac Monitor:   Rate: 104 bpm  Rhythm: Normal Sinus Rhythm     EKG interpretation: (Preliminary) 18:28  Rhythm: normal sinus rhythm; and regular . Rate (approx.): 77; Axis: normal; SC interval: normal; QRS interval: normal ; ST/T wave: normal.  Written by RALPH Miles, as dictated by Sima Shelby MD.    Records Reviewed: Nursing Notes and Old Medical Records    Provider Notes (Medical Decision Making):   DDx: coronary syndrome, PE, pancreatitis, hepatitis, biliary colic, PUD, atypical CP    Impression/Plan: Pt presents with L sided chest discomfort throughout the day. Negative cardiac workup according to pt. No clinical signs of PE. Will check labs, treat with GI cocktail, and make final disposition pending those results. ED Course:   Initial assessment performed.  The patients presenting problems have been discussed, and they are in agreement with the care plan formulated and outlined with them. I have encouraged them to ask questions as they arise throughout their visit. PROGRESS NOTE:  9:50 PM  Pt is sleeping, seemed to have some resolution to sxs with GI cocktail. Pt is ready for discharge. Bump in troponin since she has been having pain all day long. She is not compliant with PPI. Written by Brian Edmond, ED Scribe, as dictated by Myriam Rose MD    Critical Care Time:   None    Disposition:  Discharge Note:  9:53 PM  The pt is ready for discharge. The pt's signs, symptoms, diagnosis, and discharge instructions have been discussed and pt has conveyed their understanding. The pt is to follow up as recommended or return to ER should their symptoms worsen. Plan has been discussed and pt is in agreement. PLAN:  1. Current Discharge Medication List      START taking these medications    Details   pantoprazole (PROTONIX) 40 mg tablet Take 1 Tab by mouth daily for 30 doses. Qty: 30 Tab, Refills: 0         STOP taking these medications       famotidine (PEPCID) 20 mg tablet Comments:   Reason for Stopping:         omeprazole (PRILOSEC) 20 mg capsule Comments:   Reason for Stoppin.   Follow-up Information     Follow up With Details Comments Contact Info    Venkatesh Dela Cruz MD Schedule an appointment as soon as possible for a visit in 2 days  [address] [facility] [address]  [phone]      [facility] EMERGENCY DEPT  If symptoms worsen [address]  [address] [facility]  [phone]        Return to ED if worse     Diagnosis     Clinical Impression:   1. Chest pain, unspecified type        Attestations: This note is prepared by Brian Edmond, acting as Scribe for Myriam Rose MD.    Myriam Rose MD: The scribe's documentation has been prepared under my direction and personally reviewed by me in its entirety.  I confirm that the note above accurately reflects all work, treatment, procedures, and medical decision making performed by me.

## 2018-08-11 NOTE — ED NOTES
Discharge instructions given to patient by Da Bucio MD. Patient verbalized understanding of discharge instructions. Pt discharged without difficulty. Pt discharged in stable condition via ambulation, accompanied by family.

## 2018-08-11 NOTE — DISCHARGE INSTRUCTIONS
Chest Pain: Care Instructions  Your Care Instructions    There are many things that can cause chest pain. Some are not serious and will get better on their own in a few days. But some kinds of chest pain need more testing and treatment. Your doctor may have recommended a follow-up visit in the next 8 to 12 hours. If you are not getting better, you may need more tests or treatment. Even though your doctor has released you, you still need to watch for any problems. The doctor carefully checked you, but sometimes problems can develop later. If you have new symptoms or if your symptoms do not get better, get medical care right away. If you have worse or different chest pain or pressure that lasts more than 5 minutes or you passed out (lost consciousness), call 911 or seek other emergency help right away. A medical visit is only one step in your treatment. Even if you feel better, you still need to do what your doctor recommends, such as going to all suggested follow-up appointments and taking medicines exactly as directed. This will help you recover and help prevent future problems. How can you care for yourself at home? · Rest until you feel better. · Take your medicine exactly as prescribed. Call your doctor if you think you are having a problem with your medicine. · Do not drive after taking a prescription pain medicine. When should you call for help? Call 911 if:    · You passed out (lost consciousness).     · You have severe difficulty breathing.     · You have symptoms of a heart attack. These may include:  ¨ Chest pain or pressure, or a strange feeling in your chest.  ¨ Sweating. ¨ Shortness of breath. ¨ Nausea or vomiting. ¨ Pain, pressure, or a strange feeling in your back, neck, jaw, or upper belly or in one or both shoulders or arms. ¨ Lightheadedness or sudden weakness. ¨ A fast or irregular heartbeat.   After you call 911, the  may tell you to chew 1 adult-strength or 2 to 4 low-dose aspirin. Wait for an ambulance. Do not try to drive yourself.    Call your doctor today if:    · You have any trouble breathing.     · Your chest pain gets worse.     · You are dizzy or lightheaded, or you feel like you may faint.     · You are not getting better as expected.     · You are having new or different chest pain. Where can you learn more? Go to http://veronica-janene.info/. Enter A120 in the search box to learn more about \"Chest Pain: Care Instructions. \"  Current as of: 2017  Content Version: 11.7  © 7380-0665 [employer]. Care instructions adapted under license by [employer] (which disclaims liability or warranty for this information). If you have questions about a medical condition or this instruction, always ask your healthcare professional. [address] any warranty or liability for your use of this information. [employer]und Activation    Thank you for requesting access to [employer]. Please follow the instructions below to securely access and download your online medical record. [employer] allows you to send messages to your doctor, view your test results, renew your prescriptions, schedule appointments, and more. How Do I Sign Up? 1. In your internet browser, go to [email or web address]  2. Click on the First Time User? Click Here link in the Sign In box. You will be redirect to the New Member Sign Up page. 3. Enter your [employer] Access Code exactly as it appears below. You will not need to use this code after youve completed the sign-up process. If you do not sign up before the expiration date, you must request a new code. Errund Access Code: [email or web address]  Expires: 2018  6:33 PM (This is the date your Errund access code will )    4. Enter the last four digits of your Social Security Number (xxxx) and Date of Birth (mm/dd/yyyy) as indicated and click Submit.  You will be taken to the next sign-up page. 5. Create a [employer] ID. This will be your [employer] login ID and cannot be changed, so think of one that is secure and easy to remember. 6. Create a [employer] password. You can change your password at any time. 7. Enter your Password Reset Question and Answer. This can be used at a later time if you forget your password. 8. Enter your e-mail address. You will receive e-mail notification when new information is available in [address]. 9. Click Sign Up. You can now view and download portions of your medical record. 10. Click the Download Summary menu link to download a portable copy of your medical information. Additional Information    If you have questions, please visit the Frequently Asked Questions section of the [employer] website at [email or web address] Remember, [employer] is NOT to be used for urgent needs. For medical emergencies, dial 911.

## 2019-01-03 ENCOUNTER — APPOINTMENT (OUTPATIENT)
Dept: GENERAL RADIOLOGY | Age: 54
End: 2019-01-03
Attending: EMERGENCY MEDICINE
Payer: SELF-PAY

## 2019-01-03 ENCOUNTER — HOSPITAL ENCOUNTER (EMERGENCY)
Age: 54
Discharge: HOME | End: 2019-01-03
Attending: EMERGENCY MEDICINE | Admitting: EMERGENCY MEDICINE
Payer: SELF-PAY

## 2019-01-03 VITALS
SYSTOLIC BLOOD PRESSURE: 183 MMHG | RESPIRATION RATE: 16 BRPM | OXYGEN SATURATION: 99 % | TEMPERATURE: 98.4 F | BODY MASS INDEX: 36.43 KG/M2 | DIASTOLIC BLOOD PRESSURE: 97 MMHG | HEART RATE: 82 BPM | HEIGHT: 72 IN | WEIGHT: 268.96 LBS

## 2019-01-03 DIAGNOSIS — R05.9 COUGH: ICD-10-CM

## 2019-01-03 DIAGNOSIS — J20.9 ACUTE BRONCHITIS, UNSPECIFIED ORGANISM: Primary | ICD-10-CM

## 2019-01-03 DIAGNOSIS — R09.89 CHEST CONGESTION: ICD-10-CM

## 2019-01-03 PROCEDURE — 71046 X-RAY EXAM CHEST 2 VIEWS: CPT

## 2019-01-03 PROCEDURE — 99282 EMERGENCY DEPT VISIT SF MDM: CPT

## 2019-01-03 RX ORDER — AZITHROMYCIN 250 MG/1
TABLET, FILM COATED ORAL
Qty: 6 TAB | Refills: 0 | Status: SHIPPED | OUTPATIENT
Start: 2019-01-03 | End: 2019-03-05

## 2019-01-03 RX ORDER — ALBUTEROL SULFATE 90 UG/1
2 AEROSOL, METERED RESPIRATORY (INHALATION)
Qty: 1 INHALER | Refills: 0 | Status: SHIPPED | OUTPATIENT
Start: 2019-01-03 | End: 2019-07-18

## 2019-01-03 RX ORDER — PREDNISONE 10 MG/1
TABLET ORAL
Qty: 21 TAB | Refills: 0 | Status: SHIPPED | OUTPATIENT
Start: 2019-01-03 | End: 2019-03-05

## 2019-01-03 NOTE — DISCHARGE INSTRUCTIONS
Thank you!     Thank you for allowing us to provide you with excellent care today. We hope we addressed all of your concerns and needs. We strive to provide excellent quality care in the Emergency Department. You will receive a survey after your visit to evaluate the care you were provided.      Please rate us a level 5 (excellent), as anything less than excellent does not meet our goals.      If you feel that you have not received excellent quality care or timely care, please ask to speak to the nurse manager. Please choose us in the future for your continued health care needs. ______________________________________________________________________    The exam and treatment you received in the Emergency Department were for an urgent problem and are not intended as complete care. It is important that you follow-up with a doctor, nurse practitioner, or physician assistant to:  (1) confirm your diagnosis,  (2) re-evaluation of changes in your illness and treatment, and  (3) for ongoing care. If your symptoms become worse or you do not improve as expected and you are unable to reach your usual health care provider, you should return to the Emergency Department. We are available 24 hours a day. Take this sheet with you when you go to your follow-up visit. If you have any problem arranging the follow-up visit, contact [address] [place] [address] 21 [phone])    Make an appointment with your Primary Care doctor for follow up of this visit. Return to the ER if you are unable to be seen in the time recommended on your discharge instructions. Patient Education        Bronchitis: Care Instructions  Your Care Instructions    Bronchitis is inflammation of the bronchial tubes, which carry air to the lungs. The tubes swell and produce mucus, or phlegm. The mucus and inflamed bronchial tubes make you cough. You may have trouble breathing. Most cases of bronchitis are caused by viruses like those that cause colds.  Antibiotics usually do not help and they may be harmful. Bronchitis usually develops rapidly and lasts about 2 to 3 weeks in otherwise healthy people. Follow-up care is a key part of your treatment and safety. Be sure to make and go to all appointments, and call your doctor if you are having problems. It's also a good idea to know your test results and keep a list of the medicines you take. How can you care for yourself at home? · Take all medicines exactly as prescribed. Call your doctor if you think you are having a problem with your medicine. · Get some extra rest.  · Take an over-the-counter pain medicine, such as acetaminophen (Tylenol), ibuprofen (Advil, Motrin), or naproxen (Aleve) to reduce fever and relieve body aches. Read and follow all instructions on the label. · Do not take two or more pain medicines at the same time unless the doctor told you to. Many pain medicines have acetaminophen, which is Tylenol. Too much acetaminophen (Tylenol) can be harmful. · Take an over-the-counter cough medicine that contains dextromethorphan to help quiet a dry, hacking cough so that you can sleep. Avoid cough medicines that have more than one active ingredient. Read and follow all instructions on the label. · Breathe moist air from a humidifier, hot shower, or sink filled with hot water. The heat and moisture will thin mucus so you can cough it out. · Do not smoke. Smoking can make bronchitis worse. If you need help quitting, talk to your doctor about stop-smoking programs and medicines. These can increase your chances of quitting for good. When should you call for help? Call 911 anytime you think you may need emergency care.  For example, call if:    · You have severe trouble breathing.    Call your doctor now or seek immediate medical care if:    · You have new or worse trouble breathing.     · You cough up dark brown or bloody mucus (sputum).     · You have a new or higher fever.     · You have a new rash.    Watch closely for changes in your health, and be sure to contact your doctor if:    · You cough more deeply or more often, especially if you notice more mucus or a change in the color of your mucus.     · You are not getting better as expected. Where can you learn more? Go to http://veronica-janene.info/. Enter H333 in the search box to learn more about \"Bronchitis: Care Instructions. \"  Current as of: December 6, 2017  Content Version: 11.8  © 9226-1328 [employer], [employer]. Care instructions adapted under license by [employer] (which disclaims liability or warranty for this information). If you have questions about a medical condition or this instruction, always ask your healthcare professional. [address] any warranty or liability for your use of this information.

## 2019-01-03 NOTE — ED NOTES
Productive cough with green sputum x 2 weeks. Chest wall tenderness from coughing. Denies fever, chills, N/V/D.

## 2019-01-03 NOTE — ED PROVIDER NOTES
EMERGENCY DEPARTMENT HISTORY AND PHYSICAL EXAM 
 
 
Date: 1/3/2019 Patient Name: Silverio Monae History of Presenting Illness Chief Complaint Patient presents with  Cough  
  productive of green sputum x 2 weeks; chest wall pain, wheezing and chest tightness History Provided By: Patient HPI: Silverio Monae, 46 y.o. female with PMHx significant for GERD, anxiety and TIA, presents ambulatory to the ED with cc of cough and chest congestion x 2 weeks. Patient states that her symptoms began with what she thought was a cold, but states that they have continued. She denies fevers, but endorses chills. States that she has recently been around her daughter who has been sick. Associated symptoms include nasal congestion, rhinorrhea, sore throat, and productive cough. She states that her sputum has been green colored. She endorses chest tightness when she is coughing, but denies any chest pain. She denies history of asthma or COPD. Has used inhalers in the past for bronchitis. No other complaints at this time. Chief Complaint: cough, chest congestion Duration: 2 Weeks Timing:  Acute Location: chest 
Quality: Tightness Severity: 8 out of 10 Modifying Factors: none Associated Symptoms: denies any other associated signs or symptoms There are no other complaints, changes, or physical findings at this time. PCP: Nawaf Sorto MD 
 
Current Outpatient Medications Medication Sig Dispense Refill  albuterol (PROVENTIL HFA, VENTOLIN HFA, PROAIR HFA) 90 mcg/actuation inhaler Take 2 Puffs by inhalation every four (4) hours as needed for Wheezing. 1 Inhaler 0  predniSONE (STERAPRED DS) 10 mg dose pack Take as directed 21 Tab 0  
 azithromycin (ZITHROMAX Z-ESTELITA) 250 mg tablet Take 2 pills the first day, followed by 1 pill the following 4 days 6 Tab 0  
 dextromethorphan-guaiFENesin (ROBITUSSIN-DM)  mg/5 mL syrup Take 10 mL by mouth every six (6) hours as needed for Cough.  1 Bottle 0  
  ALPRAZolam (XANAX) 1 mg tablet Take 1 mg by mouth three (3) times daily as needed for Anxiety. Past History Past Medical History: 
Past Medical History:  
Diagnosis Date  Acute bronchitis 2015  Acute sinusitis  Anxiety  Arthritis  Back pain  Cocaine abuse with cocaine-induced disorder ([facility] [address]  Delivery normal   
 x4  
 Dizziness  Gastrointestinal disorder \"stomach ulcer\"  Other ill-defined conditions(799.89)   
 anemia  Palpitations  Peptic ulcer disease  Sciatica  TIA (transient ischemic attack)  Upper respiratory infection Past Surgical History: 
Past Surgical History:  
Procedure Laterality Date  ABDOMEN SURGERY PROC UNLISTED    
 ulcer  HX GYN    
 tubal ligation  HX HEENT    
 sinus surgery  HX TONSILLECTOMY  HX TONSILLECTOMY Family History: 
Family History Problem Relation Age of Onset  Stroke Mother  Heart Disease Mother Social History: 
Social History Tobacco Use  Smoking status: Former Smoker Packs/day: 0.50 Years: 15.00 Pack years: 7.50 Last attempt to quit: 2016 Years since quittin.7  Smokeless tobacco: Never Used Substance Use Topics  Alcohol use: No  
 Drug use: No  
 
 
Allergies: Allergies Allergen Reactions  Penicillins Anaphylaxis Has taken cephalexin without problem  Hydrocodone Itching Review of Systems Review of Systems Constitutional: Positive for chills. Negative for fever. HENT: Positive for congestion, rhinorrhea and sore throat. Eyes: Negative for pain. Respiratory: Positive for cough and chest tightness. Negative for shortness of breath. Cardiovascular: Negative for chest pain. Gastrointestinal: Negative for abdominal pain, diarrhea, nausea and vomiting. Genitourinary: Negative for dysuria and hematuria. Musculoskeletal: Negative for arthralgias and myalgias. Skin: Negative for rash. Neurological: Negative for dizziness, light-headedness, numbness and headaches. Psychiatric/Behavioral: Negative for behavioral problems and confusion. Physical Exam  
Physical Exam  
Constitutional: She is oriented to person, place, and time. She appears well-developed and well-nourished. No distress. HENT:  
Head: Normocephalic and atraumatic. Right Ear: Hearing, tympanic membrane, external ear and ear canal normal. No swelling or tenderness. Tympanic membrane is not erythematous. No middle ear effusion. Left Ear: Hearing, tympanic membrane, external ear and ear canal normal. No swelling or tenderness. Tympanic membrane is not erythematous. No middle ear effusion. Nose: Mucosal edema and rhinorrhea present. Mouth/Throat: Uvula is midline and oropharynx is clear and moist. No trismus in the jaw. No uvula swelling. No oropharyngeal exudate, posterior oropharyngeal edema, posterior oropharyngeal erythema or tonsillar abscesses. Eyes: Conjunctivae and EOM are normal.  
Neck: Normal range of motion. Neck supple. Cardiovascular: Normal rate, regular rhythm, normal heart sounds and intact distal pulses. No murmur heard. Pulmonary/Chest: Effort normal. She has no decreased breath sounds. She has no wheezes. She has rhonchi in the right lower field and the left lower field. Abdominal: Soft. Bowel sounds are normal. She exhibits no distension. There is no tenderness. There is no guarding. Musculoskeletal: Normal range of motion. She exhibits no edema or tenderness. Neurological: She is alert and oriented to person, place, and time. Skin: Skin is warm and dry. No rash noted. She is not diaphoretic. Psychiatric: She has a normal mood and affect. Her behavior is normal. Judgment normal.  
Nursing note and vitals reviewed. Diagnostic Study Results Labs - No results found for this or any previous visit (from the past 12 hour(s)). Radiologic Studies -  
XR CHEST PA LAT Final Result IMPRESSION: No acute cardiopulmonary process seen CT Results  (Last 48 hours) None CXR Results  (Last 48 hours) 01/03/19 1832  XR CHEST PA LAT Final result Impression:  IMPRESSION: No acute cardiopulmonary process seen Narrative:  EXAM: XR CHEST PA LAT INDICATION: cough, shortness of breath COMPARISON: 8.10.2018. FINDINGS: PA and lateral radiographs of the chest demonstrate clear lungs. The  
cardiac and mediastinal contours and pulmonary vascularity are remarkable for  
mild tortuosity of the descending aorta. The bones and soft tissues are within  
normal limits. Medical Decision Making I am the first provider for this patient. I reviewed the vital signs, available nursing notes, past medical history, past surgical history, family history and social history. Vital Signs-Reviewed the patient's vital signs. Patient Vitals for the past 12 hrs: 
 Temp Pulse Resp BP SpO2  
01/03/19 1733 98.4 °F (36.9 °C) 82 16 (!) 183/97 99 % Pulse Oximetry Analysis - 99% on RA Records Reviewed: Nursing Notes and Old Medical Records Provider Notes (Medical Decision Making): DDx: pneumonia, bronchitis, bronchospasm, URI, asthma, COPD, pneumothorax, pulmonary edema Patient presents with cough x 2 weeks. Will obtain CXR. ED Course:  
Initial assessment performed. The patients presenting problems have been discussed, and they are in agreement with the care plan formulated and outlined with them. I have encouraged them to ask questions as they arise throughout their visit. Disposition: 
DISCHARGE NOTE: 
7:09 PM 
The care plan has been outline with the patient and/or family, who verbally conveyed understanding and agreement. Available results have been reviewed. Patient and/or family understand the follow up plan as outlined and discharge instructions.  Should their condition deterioration at any time after discharge the patient agrees to return, follow up sooner than outlined or seek medical assistance at the closest Emergency Room as soon as possible. Questions have been answered. Discharge instructions and educational information regarding the patient's diagnosis as well a list of reasons why the patient would want to seek immediate medical attention, should their condition change, were reviewed directly with the patient/family PLAN: 
1. Discharge home 2. Medications as directed 3. Schedule f/u with PCP 4. Return precautions reviewed Discharge Medication List as of 1/3/2019  6:57 PM  
  
START taking these medications Details  
predniSONE (STERAPRED DS) 10 mg dose pack Take as directed, Normal, Disp-21 Tab, R-0  
  
azithromycin (ZITHROMAX Z-ESTELITA) 250 mg tablet Take 2 pills the first day, followed by 1 pill the following 4 days, Normal, Disp-6 Tab, R-0  
  
dextromethorphan-guaiFENesin (ROBITUSSIN-DM)  mg/5 mL syrup Take 10 mL by mouth every six (6) hours as needed for Cough., Normal, Disp-1 Bottle, R-0  
  
  
CONTINUE these medications which have CHANGED Details  
albuterol (PROVENTIL HFA, VENTOLIN HFA, PROAIR HFA) 90 mcg/actuation inhaler Take 2 Puffs by inhalation every four (4) hours as needed for Wheezing., Normal, Disp-1 Inhaler, R-0  
  
  
CONTINUE these medications which have NOT CHANGED Details ALPRAZolam (XANAX) 1 mg tablet Take 1 mg by mouth three (3) times daily as needed for Anxiety. , Historical Med  
  
  
STOP taking these medications  
  
 predniSONE (DELTASONE) 20 mg tablet Comments:  
Reason for Stopping:   
   
 oxyCODONE-acetaminophen (PERCOCET) 5-325 mg per tablet Comments:  
Reason for Stoppin.  
Follow-up Information Follow up With Specialties Details Why Contact Info Justino Cheatham MD Internal Medicine In 3 days   [address] [address] 7 [facility] 7 [address] [phone] \[facility] [place] [facility]\"" EMERGENCY DEPT Emergency Medicine  As needed, If symptoms worsen [address] [facility] 
[phone] Return to ED if worse Diagnosis Clinical Impression: 1. Acute bronchitis, unspecified organism 2. Cough 3. Chest congestion This note will not be viewable in [address]

## 2019-01-03 NOTE — LETTER
[address] 
[facility] EMERGENCY DEPT 
[address] [place] [id number] 
[phone] Work/School Note Date: 1/3/2019 To Whom It May concern: 
 
Leeanne Farley was seen and treated today in the emergency room by the following provider(s): 
Attending Provider: Praful Marion MD 
Physician Assistant: Renato Carreno PA-C. Leeanne Farley may return to work on 6 January 2018. Sincerely, Joaquín Nagel PA-C

## 2019-01-03 NOTE — ED NOTES
Discharge instructions given to patient by HAYDEE ELKINS  Verbalized understanding of instructions. Patient discharged without difficulty. Patient discharged in stable condition via ambulation accompanied by self.

## 2019-02-03 ENCOUNTER — HOSPITAL ENCOUNTER (EMERGENCY)
Age: 54
Discharge: HOME | End: 2019-02-03
Attending: EMERGENCY MEDICINE | Admitting: EMERGENCY MEDICINE
Payer: SELF-PAY

## 2019-02-03 ENCOUNTER — APPOINTMENT (OUTPATIENT)
Dept: GENERAL RADIOLOGY | Age: 54
End: 2019-02-03
Attending: NURSE PRACTITIONER
Payer: SELF-PAY

## 2019-02-03 VITALS
HEIGHT: 72 IN | WEIGHT: 275.57 LBS | RESPIRATION RATE: 18 BRPM | BODY MASS INDEX: 37.33 KG/M2 | TEMPERATURE: 97.9 F | HEART RATE: 75 BPM | SYSTOLIC BLOOD PRESSURE: 148 MMHG | DIASTOLIC BLOOD PRESSURE: 83 MMHG | OXYGEN SATURATION: 99 %
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
## 2019-02-03 PROCEDURE — 72100 X-RAY EXAM L-S SPINE 2/3 VWS: CPT

## 2019-02-03 PROCEDURE — 74011250637 HC RX REV CODE- 250/637: Performed by: NURSE PRACTITIONER

## 2019-02-03 PROCEDURE — 73562 X-RAY EXAM OF KNEE 3: CPT

## 2019-02-03 PROCEDURE — 73552 X-RAY EXAM OF FEMUR 2/>: CPT

## 2019-02-03 PROCEDURE — 73502 X-RAY EXAM HIP UNI 2-3 VIEWS: CPT

## 2019-02-03 PROCEDURE — 99284 EMERGENCY DEPT VISIT MOD MDM: CPT

## 2019-02-03 RX ORDER — OXYCODONE AND ACETAMINOPHEN 5; 325 MG/1; MG/1
1 TABLET ORAL
Qty: 10 TAB | Refills: 0 | Status: SHIPPED | OUTPATIENT
Start: 2019-02-03 | End: 2019-03-05

## 2019-02-03 RX ORDER — OXYCODONE AND ACETAMINOPHEN 5; 325 MG/1; MG/1
1 TABLET ORAL
Status: COMPLETED | OUTPATIENT
Start: 2019-02-03 | End: 2019-02-03

## 2019-02-03 RX ORDER — METHOCARBAMOL 750 MG/1
750 TABLET, FILM COATED ORAL 4 TIMES DAILY
Qty: 20 TAB | Refills: 0 | Status: SHIPPED | OUTPATIENT
Start: 2019-02-03 | End: 2019-03-05

## 2019-02-03 RX ORDER — KETOROLAC TROMETHAMINE 10 MG/1
10 TABLET, FILM COATED ORAL ONCE
Status: COMPLETED | OUTPATIENT
Start: 2019-02-03 | End: 2019-02-03

## 2019-02-03 RX ORDER — KETOROLAC TROMETHAMINE 10 MG/1
10 TABLET, FILM COATED ORAL
Qty: 20 TAB | Refills: 0 | Status: SHIPPED | OUTPATIENT
Start: 2019-02-03 | End: 2019-03-05

## 2019-02-03 RX ADMIN — KETOROLAC TROMETHAMINE 10 MG: 10 TABLET, FILM COATED ORAL at 12:28

## 2019-02-03 RX ADMIN — OXYCODONE AND ACETAMINOPHEN 1 TABLET: 5; 325 TABLET ORAL at 12:28

## 2019-02-03 NOTE — ED PROVIDER NOTES
EMERGENCY DEPARTMENT HISTORY AND PHYSICAL EXAM 
 
 
Date: 2/3/2019 Patient Name: Luigi Manzo History of Presenting Illness Chief Complaint Patient presents with  Fall  
  slipped on black ice on her steps yesterday and now with posterior leg pain, limping gait History Provided By: Patient HPI: Luigi Manzo, 53 y.o. female with PMHx significant for HTN, presents ambulatory from home to the ED with cc of left posterior leg and back pain after she slipped and fell on the black ice yesterday morning at 0600. Pain has been progressive. No loss of bowel or bladder function, distal numbness or tingling, or saddle anesthesia. No CHI. No neck pain. Pt denies fevers, chills, night sweats, chest pain, pressure, SOB, SIMON, PND, orthopnea, abdominal pain, n/v/d, melena, hematuria, dysuria, constipation, HA, dizziness, and syncope There are no other complaints, changes, or physical findings at this time. PCP: Rosa Villa MD 
 
No current facility-administered medications on file prior to encounter. Current Outpatient Medications on File Prior to Encounter Medication Sig Dispense Refill  ALPRAZolam (XANAX) 1 mg tablet Take 1 mg by mouth three (3) times daily as needed for Anxiety.  albuterol (PROVENTIL HFA, VENTOLIN HFA, PROAIR HFA) 90 mcg/actuation inhaler Take 2 Puffs by inhalation every four (4) hours as needed for Wheezing. 1 Inhaler 0  predniSONE (STERAPRED DS) 10 mg dose pack Take as directed 21 Tab 0  
 azithromycin (ZITHROMAX Z-ESTELITA) 250 mg tablet Take 2 pills the first day, followed by 1 pill the following 4 days 6 Tab 0  
 dextromethorphan-guaiFENesin (ROBITUSSIN-DM)  mg/5 mL syrup Take 10 mL by mouth every six (6) hours as needed for Cough. 1 Bottle 0 Past History Past Medical History: 
Past Medical History:  
Diagnosis Date  Acute bronchitis Jan 2015  Acute sinusitis  Anxiety  Arthritis  Back pain  [facility]  Delivery normal   
 x4  
 Dizziness  Gastrointestinal disorder \"stomach ulcer\"  Other ill-defined conditions(799.89)   
 anemia  Palpitations  Peptic ulcer disease  Sciatica  TIA (transient ischemic attack)  Upper respiratory infection Past Surgical History: 
Past Surgical History:  
Procedure Laterality Date  ABDOMEN SURGERY PROC UNLISTED    
 ulcer  HX GYN    
 tubal ligation  HX HEENT    
 sinus surgery  HX TONSILLECTOMY  HX TONSILLECTOMY Family History: 
Family History Problem Relation Age of Onset  Stroke Mother  Heart Disease Mother Social History: 
Social History Tobacco Use  Smoking status: Former Smoker Packs/day: 0.50 Years: 15.00 Pack years: 7.50 Last attempt to quit: 2016 Years since quittin.8  Smokeless tobacco: Never Used Substance Use Topics  Alcohol use: No  
 Drug use: No  
 
 
Allergies: Allergies Allergen Reactions  Penicillins Anaphylaxis Has taken cephalexin without problem  Hydrocodone Itching Review of Systems Review of Systems Constitutional: Negative for activity change, appetite change, chills, diaphoresis, fatigue, fever and unexpected weight change. HENT: Negative for congestion, ear pain, rhinorrhea, sinus pressure, sore throat and tinnitus. Eyes: Negative for photophobia, pain, discharge and visual disturbance. Respiratory: Negative for apnea, cough, choking, chest tightness, shortness of breath, wheezing and stridor. Cardiovascular: Negative for chest pain, palpitations and leg swelling. Gastrointestinal: Negative for abdominal pain, constipation, diarrhea, nausea and vomiting. Endocrine: Negative for polydipsia, polyphagia and polyuria. Genitourinary: Negative for decreased urine volume, dyspareunia, dysuria, enuresis, flank pain, frequency, hematuria and urgency. Musculoskeletal: Positive for back pain. Negative for arthralgias, gait problem, myalgias and neck pain. Left leg pain Skin: Negative for color change, pallor, rash and wound. Allergic/Immunologic: Negative for immunocompromised state. Neurological: Negative for dizziness, seizures, syncope, weakness, light-headedness and headaches. Hematological: Does not bruise/bleed easily. Psychiatric/Behavioral: Negative for agitation and confusion. The patient is not nervous/anxious. Physical Exam  
Physical Exam  
Constitutional: She is oriented to person, place, and time. She appears well-developed and well-nourished. No distress. HENT:  
Head: Normocephalic. Right Ear: External ear normal.  
Left Ear: External ear normal.  
Mouth/Throat: Oropharynx is clear and moist. No oropharyngeal exudate. Eyes: Conjunctivae and EOM are normal. Pupils are equal, round, and reactive to light. Right eye exhibits no discharge. Left eye exhibits no discharge. No scleral icterus. Neck: Normal range of motion. Neck supple. No JVD present. No tracheal deviation present. No thyromegaly present. Cardiovascular: Normal rate, regular rhythm, normal heart sounds and intact distal pulses. Exam reveals no gallop and no friction rub. No murmur heard. Pulmonary/Chest: Effort normal and breath sounds normal. No stridor. No respiratory distress. She has no wheezes. She has no rales. She exhibits no tenderness. Abdominal: Soft. Bowel sounds are normal. She exhibits no distension and no mass. There is no tenderness. There is no rebound and no guarding. Musculoskeletal: Normal range of motion. She exhibits no edema. Left knee: Normal.  
     Lumbar back: She exhibits tenderness and pain. Back: 
 
     Left upper leg: She exhibits tenderness. She exhibits no bony tenderness, no swelling and no edema. Lymphadenopathy:  
  She has no cervical adenopathy. Neurological: She is alert and oriented to person, place, and time. She displays normal reflexes. No cranial nerve deficit. Coordination normal.  
Skin: Skin is warm and dry. No rash noted. She is not diaphoretic. No erythema. No pallor. Psychiatric: She has a normal mood and affect. Her behavior is normal. Judgment and thought content normal.  
Nursing note and vitals reviewed. Diagnostic Study Results Labs - No results found for this or any previous visit (from the past 12 hour(s)). Radiologic Studies -  
XR HIP LT W OR WO PELV 2-3 VWS Final Result IMPRESSION: No acute abnormality. XR SPINE LUMB 2 OR 3 V Final Result Impression: No acute process. Multilevel lumbosacral degenerative disc disease. XR KNEE LT 3 V Final Result IMPRESSION: No acute abnormality. XR FEMUR LT 2 V Final Result IMPRESSION: No acute abnormality. CT Results  (Last 48 hours) None CXR Results  (Last 48 hours) None Medical Decision Making I am the first provider for this patient. I reviewed the vital signs, available nursing notes, past medical history, past surgical history, family history and social history. Vital Signs-Reviewed the patient's vital signs. Patient Vitals for the past 12 hrs: 
 Temp Pulse Resp BP SpO2  
02/03/19 1012 97.9 °F (36.6 °C) 75 18 148/83 99 % Pulse Oximetry Analysis - 99% on RA Cardiac Monitor:  
Rate: 75 bpm 
 
Records Reviewed: Nursing Notes, Old Medical Records, Previous electrocardiograms, Previous Radiology Studies and Previous Laboratory Studies Provider Notes (Medical Decision Making): Fall MSK pain/strain Analgesia XR series ED Course:  
Initial assessment performed. The patients presenting problems have been discussed, and they are in agreement with the care plan formulated and outlined with them. I have encouraged them to ask questions as they arise throughout their visit. Stable, ambulatory pt in [facility] Critical Care Time:  
0 Disposition: 
Discharge to home with ortho follow up PLAN: 
1. Discharge Medication List as of 2/3/2019  1:21 PM  
  
START taking these medications Details  
oxyCODONE-acetaminophen (PERCOCET) 5-325 mg per tablet Take 1 Tab by mouth every four (4) hours as needed for Pain. Max Daily Amount: 6 Tabs., Print, Disp-10 Tab, R-0  
  
methocarbamol (ROBAXIN-750) 750 mg tablet Take 1 Tab by mouth four (4) times daily. , Print, Disp-20 Tab, R-0  
  
ketorolac (TORADOL) 10 mg tablet Take 1 Tab by mouth every six (6) hours as needed for Pain., Print, Disp-20 Tab, R-0  
  
  
CONTINUE these medications which have NOT CHANGED Details ALPRAZolam (XANAX) 1 mg tablet Take 1 mg by mouth three (3) times daily as needed for Anxiety. , Historical Med  
  
albuterol (PROVENTIL HFA, VENTOLIN HFA, PROAIR HFA) 90 mcg/actuation inhaler Take 2 Puffs by inhalation every four (4) hours as needed for Wheezing., Normal, Disp-1 Inhaler, R-0  
  
predniSONE (STERAPRED DS) 10 mg dose pack Take as directed, Normal, Disp-21 Tab, R-0  
  
azithromycin (ZITHROMAX Z-ESTELITA) 250 mg tablet Take 2 pills the first day, followed by 1 pill the following 4 days, Normal, Disp-6 Tab, R-0  
  
dextromethorphan-guaiFENesin (ROBITUSSIN-DM)  mg/5 mL syrup Take 10 mL by mouth every six (6) hours as needed for Cough., Normal, Disp-1 Bottle, R-0  
  
  
 
2. Follow-up Information Follow up With Specialties Details Why Contact Info Katie Hernandez MD Internal Medicine In 2 days  [address] [facility] [address] [facility] 7 [address] 
[phone] Pete Singh MD Orthopedic Surgery In 2 days  [address] 
[phone] [facility] EMERGENCY DEPT Emergency Medicine  As needed, If symptoms worsen [address] [facility] 
[phone] Return to ED if worse Diagnosis Clinical Impression: 1. Fall, initial encounter Attestations: 
 
Linsey Garrido NP 
3:26 PM

## 2019-02-03 NOTE — LETTER
NOTIFICATION RETURN TO WORK / SCHOOL 
 
2/3/2019 1:21 PM 
 
Ms. Erin Rosen [address] [place] [id number] To Whom It May Concern: 
 
Erin Rosen is currently under the care of [facility] EMERGENCY DEPT. She will return to work/school on: February 6, 2019 If there are questions or concerns please have the patient contact our office.  
 
 
 
Sincerely, 
 
 
 
Anitha Camargo NP 
1:22 PM

## 2019-02-03 NOTE — DISCHARGE INSTRUCTIONS
Patient Education        Preventing Falls: Care Instructions  Your Care Instructions    Getting around your home safely can be a challenge if you have injuries or health problems that make it easy for you to fall. Loose rugs and furniture in walkways are among the dangers for many older people who have problems walking or who have poor eyesight. People who have conditions such as arthritis, osteoporosis, or dementia also have to be careful not to fall. You can make your home safer with a few simple measures. Follow-up care is a key part of your treatment and safety. Be sure to make and go to all appointments, and call your doctor if you are having problems. It's also a good idea to know your test results and keep a list of the medicines you take. How can you care for yourself at home? Taking care of yourself  · You may get dizzy if you do not drink enough water. To prevent dehydration, drink plenty of fluids, enough so that your urine is light yellow or clear like water. Choose water and other caffeine-free clear liquids. If you have kidney, heart, or liver disease and have to limit fluids, talk with your doctor before you increase the amount of fluids you drink. · Exercise regularly to improve your strength, muscle tone, and balance. Walk if you can. Swimming may be a good choice if you cannot walk easily. · Have your vision and hearing checked each year or any time you notice a change. If you have trouble seeing and hearing, you might not be able to avoid objects and could lose your balance. · Know the side effects of the medicines you take. Ask your doctor or pharmacist whether the medicines you take can affect your balance. Sleeping pills or sedatives can affect your balance. · Limit the amount of alcohol you drink. Alcohol can impair your balance and other senses. · Ask your doctor whether calluses or corns on your feet need to be removed.  If you wear loose-fitting shoes because of calluses or corns, you can lose your balance and fall. · Talk to your doctor if you have numbness in your feet. Preventing falls at home  · Remove raised doorway thresholds, throw rugs, and clutter. Repair loose carpet or raised areas in the floor. · Move furniture and electrical cords to keep them out of walking paths. · Use nonskid floor wax, and wipe up spills right away, especially on ceramic tile floors. · If you use a walker or cane, put rubber tips on it. If you use crutches, clean the bottoms of them regularly with an abrasive pad, such as steel wool. · Keep your house well lit, especially [facility], and outside walkways. Use night-lights in areas such as hallways and bathrooms. Add extra light switches or use remote switches (such as switches that go on or off when you clap your hands) to make it easier to turn lights on if you have to get up during the night. · Install sturdy handrails on stairways. · Move items in your cabinets so that the things you use a lot are on the lower shelves (about waist level). · Keep a cordless phone and a flashlight with new batteries by your bed. If possible, put a phone in each of the main rooms of your house, or carry a cell phone in case you fall and cannot reach a phone. Or, you can wear a device around your neck or wrist. You push a button that sends a signal for help. · Wear low-heeled shoes that fit well and give your feet good support. Use footwear with nonskid soles. Check the heels and soles of your shoes for wear. Repair or replace worn heels or soles. · Do not wear socks without shoes on wood floors. · Walk on the grass when the sidewalks are slippery. If you live in an area that gets snow and ice in the winter, sprinkle salt on slippery steps and sidewalks. Preventing falls in the bath  · Install grab bars and nonskid mats inside and outside your shower or tub and near the toilet and sinks. · Use shower chairs and bath benches.   · Use a hand-held shower head that will allow you to sit while showering. · Get into a tub or shower by putting the weaker leg in first. Get out of a tub or shower with your strong side first.  · Repair loose toilet seats and consider installing a raised toilet seat to make getting on and off the toilet easier. · Keep your bathroom door unlocked while you are in the shower. Where can you learn more? Go to http://veronica-janene.info/. Enter [phone] in the search box to learn more about \"Preventing Falls: Care Instructions. \"  Current as of: March 16, 2018  Content Version: 11.8  © 9364-3418 [email or web address]. Care instructions adapted under license by [employer] (which disclaims liability or warranty for this information). If you have questions about a medical condition or this instruction, always ask your healthcare professional. [place] [address] any warranty or liability for your use of this information. We hope that we have addressed all of your medical concerns. The examination and treatment you received in the Emergency Department were for an emergent problem and were not intended as complete care. It is important that you follow up with your healthcare provider(s) for ongoing care. If your symptoms worsen or do not improve as expected, and you are unable to reach your usual health care provider(s), you should return to the Emergency Department. [address] participate in nationally recognized quality of care measures. If your blood pressure is greater than 120/80, as reported below, we urge that you seek medical care to address the potential of high blood pressure, commonly known as hypertension. Hypertension can be hereditary or can be caused by certain medical conditions, pain, stress, or \"white coat syndrome. \"       Please make an appointment with your health care provider(s) for follow up of your Emergency Department visit.        VITALS: Patient Vitals for the past 8 hrs:   Temp Pulse Resp BP SpO2   02/03/19 1012 97.9 °F (36.6 °C) 75 18 148/83 99 %          Thank you for allowing us to provide you with medical care today. We realize that you have many choices for your emergency care needs. Please choose us in the future for any continued health care needs. Kane Leslie NP              No results found for this or any previous visit (from the past 24 hour(s)). Xr Spine Lumb 2 Or 3 V    Result Date: 2/3/2019  Indication: Status post fall with back pain Comparison to 11/22/2013 Three views of the lumbar spine are negative for acute fracture. Degenerative disc disease is noted at L3-4, L4-5, and L5-S1. Mild grade 1 spondylolisthesis is noted at L4-5. Impression: No acute process. Multilevel lumbosacral degenerative disc disease. Xr Hip Lt W Or Wo Pelv 2-3 Vws    Result Date: 2/3/2019  EXAM: XR HIP LT W OR WO PELV 2-3 VWS INDICATION: fall. Acute left hip pain COMPARISON: None. FINDINGS: An AP view of the pelvis and a frogleg lateral view of the left hip demonstrate no fracture, dislocation or other acute abnormality. IMPRESSION: No acute abnormality. Xr Femur Lt 2 V    Result Date: 2/3/2019  EXAM: XR FEMUR LT 2 V INDICATION: fall. Acute left leg pain COMPARISON: None. FINDINGS: Two views of the left femur demonstrate no fracture or other acute osseous, articular or soft tissue abnormality. IMPRESSION: No acute abnormality. Xr Knee Lt 3 V    Result Date: 2/3/2019  EXAM: XR KNEE LT 3 V INDICATION: fall. Acute left knee pain COMPARISON: 1/13/2017. FINDINGS: Three views of the left knee demonstrate no fracture or other acute osseous or articular abnormality. There is no effusion. IMPRESSION: No acute abnormality.

## 2019-03-05 ENCOUNTER — HOSPITAL ENCOUNTER (EMERGENCY)
Age: 54
Discharge: HOME | End: 2019-03-05
Attending: EMERGENCY MEDICINE
Payer: SELF-PAY

## 2019-03-05 ENCOUNTER — APPOINTMENT (OUTPATIENT)
Dept: GENERAL RADIOLOGY | Age: 54
End: 2019-03-05
Attending: PHYSICIAN ASSISTANT
Payer: SELF-PAY

## 2019-03-05 VITALS
OXYGEN SATURATION: 100 % | BODY MASS INDEX: 37 KG/M2 | TEMPERATURE: 98.7 F | HEART RATE: 71 BPM | RESPIRATION RATE: 16 BRPM | SYSTOLIC BLOOD PRESSURE: 158 MMHG | HEIGHT: 72 IN | DIASTOLIC BLOOD PRESSURE: 99 MMHG | WEIGHT: 273.15 LBS

## 2019-03-05 DIAGNOSIS — R42 DIZZINESS: ICD-10-CM

## 2019-03-05 DIAGNOSIS — R03.0 ELEVATED BLOOD PRESSURE READING: ICD-10-CM

## 2019-03-05 DIAGNOSIS — J06.9 ACUTE UPPER RESPIRATORY INFECTION: Primary | ICD-10-CM

## 2019-03-05 PROCEDURE — 71046 X-RAY EXAM CHEST 2 VIEWS: CPT

## 2019-03-05 PROCEDURE — 99283 EMERGENCY DEPT VISIT LOW MDM: CPT

## 2019-03-05 PROCEDURE — 74011250637 HC RX REV CODE- 250/637: Performed by: PHYSICIAN ASSISTANT

## 2019-03-05 PROCEDURE — 74011250636 HC RX REV CODE- 250/636: Performed by: PHYSICIAN ASSISTANT

## 2019-03-05 PROCEDURE — 93005 ELECTROCARDIOGRAM TRACING: CPT

## 2019-03-05 RX ORDER — MECLIZINE HCL 12.5 MG 12.5 MG/1
25 TABLET ORAL
Status: COMPLETED | OUTPATIENT
Start: 2019-03-05 | End: 2019-03-05

## 2019-03-05 RX ORDER — MECLIZINE HYDROCHLORIDE 25 MG/1
25 TABLET ORAL
Qty: 20 TAB | Refills: 0 | Status: SHIPPED | OUTPATIENT
Start: 2019-03-05 | End: 2019-07-18

## 2019-03-05 RX ORDER — GUAIFENESIN 100 MG/5ML
200 SOLUTION ORAL
Status: COMPLETED | OUTPATIENT
Start: 2019-03-05 | End: 2019-03-05

## 2019-03-05 RX ORDER — NAPROXEN 250 MG/1
500 TABLET ORAL
Status: COMPLETED | OUTPATIENT
Start: 2019-03-05 | End: 2019-03-05

## 2019-03-05 RX ADMIN — GUAIFENESIN 200 MG: 200 SOLUTION ORAL at 19:11

## 2019-03-05 RX ADMIN — NAPROXEN 500 MG: 250 TABLET ORAL at 19:11

## 2019-03-05 RX ADMIN — MECLIZINE 25 MG: 12.5 TABLET ORAL at 19:11

## 2019-03-05 NOTE — ED PROVIDER NOTES
EMERGENCY DEPARTMENT HISTORY AND PHYSICAL EXAM      Date: 3/5/2019  Patient Name: Lyly Evans    History of Presenting Illness     Chief Complaint   Patient presents with    Dizziness     pt reports lightheadedness, runny nose and cough    Nasal Congestion    Cough       History Provided By: Patient    HPI: Lyly Evans, 53 y.o. female with PMHx significant for anemia, arthritis, TIA, sciatica, and PUD, presents ambulatory to the ED with cc of new onset dizziness with intermittent light-headedness x 6 hours with associated cough and rhinorrhea. Pt states that sxs began shortly after eating lunch. She notes that they have occurred intermittently throughout the day. She denies a hx of kidney disease, liver disease, or thyroid disease. Pt denies taking any medication pta. She denies any modifying factors. She specifically denies any fevers, chills, wheezing, nausea, vomiting, chest pain, shortness of breath, headache, rash, diarrhea, sweating or weight loss. There are no other complaints, changes, or physical findings at this time. PCP: Maulik Ramirez MD    No current facility-administered medications on file prior to encounter. Current Outpatient Medications on File Prior to Encounter   Medication Sig Dispense Refill    oxyCODONE-acetaminophen (PERCOCET) 5-325 mg per tablet Take 1 Tab by mouth every four (4) hours as needed for Pain. Max Daily Amount: 6 Tabs. 10 Tab 0    methocarbamol (ROBAXIN-750) 750 mg tablet Take 1 Tab by mouth four (4) times daily. 20 Tab 0    ketorolac (TORADOL) 10 mg tablet Take 1 Tab by mouth every six (6) hours as needed for Pain. 20 Tab 0    albuterol (PROVENTIL HFA, VENTOLIN HFA, PROAIR HFA) 90 mcg/actuation inhaler Take 2 Puffs by inhalation every four (4) hours as needed for Wheezing.  1 Inhaler 0    predniSONE (STERAPRED DS) 10 mg dose pack Take as directed 21 Tab 0    azithromycin (ZITHROMAX Z-ESTELITA) 250 mg tablet Take 2 pills the first day, followed by 1 pill the following 4 days 6 Tab 0    dextromethorphan-guaiFENesin (ROBITUSSIN-DM)  mg/5 mL syrup Take 10 mL by mouth every six (6) hours as needed for Cough. 1 Bottle 0    ALPRAZolam (XANAX) 1 mg tablet Take 1 mg by mouth three (3) times daily as needed for Anxiety. Past History     Past Medical History:  Past Medical History:   Diagnosis Date    Acute bronchitis 2015    Acute sinusitis     Anxiety     Arthritis     Back pain     Cocaine abuse with cocaine-induced disorder (HCC)     Delivery normal     x4    Dizziness     Gastrointestinal disorder     \"stomach ulcer\"    Other ill-defined conditions(799.89)     anemia    Palpitations     Peptic ulcer disease     Sciatica     TIA (transient ischemic attack)     Upper respiratory infection        Past Surgical History:  Past Surgical History:   Procedure Laterality Date    ABDOMEN SURGERY PROC UNLISTED      ulcer    HX GYN      tubal ligation    HX HEENT      sinus surgery    HX TONSILLECTOMY      HX TONSILLECTOMY         Family History:  Family History   Problem Relation Age of Onset    Stroke Mother     Heart Disease Mother        Social History:  Social History     Tobacco Use    Smoking status: Former Smoker     Packs/day: 0.50     Years: 15.00     Pack years: 7.50     Last attempt to quit: 2016     Years since quittin.9    Smokeless tobacco: Never Used   Substance Use Topics    Alcohol use: No    Drug use: No       Allergies: Allergies   Allergen Reactions    Penicillins Anaphylaxis     Has taken cephalexin without problem    Hydrocodone Itching         Review of Systems   Review of Systems   Constitutional: Negative. Negative for activity change, appetite change, chills, fatigue, fever and unexpected weight change. HENT: Negative. Negative for congestion, hearing loss, rhinorrhea, sneezing and voice change. Eyes: Negative. Negative for pain and visual disturbance. Respiratory: Positive for cough.  Negative for apnea, choking, chest tightness, shortness of breath and wheezing. Cardiovascular: Negative. Negative for chest pain and palpitations. Gastrointestinal: Negative. Negative for abdominal distention, abdominal pain, blood in stool, diarrhea, nausea and vomiting. Genitourinary: Negative. Negative for difficulty urinating, flank pain, frequency and urgency. No discharge   Musculoskeletal: Negative. Negative for arthralgias, back pain, myalgias and neck stiffness. Skin: Negative. Negative for color change and rash. Neurological: Positive for dizziness and light-headedness. Negative for seizures, syncope, speech difficulty, weakness, numbness and headaches. Hematological: Negative for adenopathy. Psychiatric/Behavioral: Negative. Negative for agitation, behavioral problems, dysphoric mood and suicidal ideas. The patient is not nervous/anxious. Physical Exam   Physical Exam   Constitutional: She is oriented to person, place, and time. She appears well-developed and well-nourished. No distress. Pt is edentulous, overweight, and has a slight cough. HENT:   Head: Normocephalic and atraumatic. Mouth/Throat: Oropharynx is clear and moist. No oropharyngeal exudate. Eyes: Conjunctivae and EOM are normal. Pupils are equal, round, and reactive to light. Right eye exhibits no discharge. Left eye exhibits no discharge. Neck: Normal range of motion. Neck supple. Cardiovascular: Normal rate, regular rhythm and intact distal pulses. Exam reveals no gallop and no friction rub. No murmur heard. Pulmonary/Chest: Effort normal and breath sounds normal. No respiratory distress. She has no wheezes. She has no rales. She exhibits no tenderness. Abdominal: Soft. Bowel sounds are normal. She exhibits no distension and no mass. There is no tenderness. There is no rebound and no guarding. Musculoskeletal: Normal range of motion. She exhibits no edema.    Lymphadenopathy:     She has no cervical adenopathy. Neurological: She is alert and oriented to person, place, and time. No cranial nerve deficit. Coordination normal.   Cranial nerve 2-12 grossly intact. Skin: Skin is warm and dry. No rash noted. No erythema. Psychiatric: She has a normal mood and affect. Nursing note and vitals reviewed. Diagnostic Study Results     Labs -     Recent Results (from the past 12 hour(s))   EKG, 12 LEAD, INITIAL    Collection Time: 03/05/19  4:54 PM   Result Value Ref Range    Ventricular Rate 61 BPM    Atrial Rate 61 BPM    P-R Interval 156 ms    QRS Duration 84 ms    Q-T Interval 428 ms    QTC Calculation (Bezet) 430 ms    Calculated P Axis 35 degrees    Calculated R Axis -16 degrees    Calculated T Axis 8 degrees    Diagnosis       Normal sinus rhythm  Cannot rule out Anterior infarct , age undetermined  When compared with ECG of 10-AUG-2018 18:28,  No significant change was found         Radiologic Studies -   No orders to display     CT Results  (Last 48 hours)    None        CXR Results  (Last 48 hours)    None            Medical Decision Making   I am the first provider for this patient. I reviewed the vital signs, available nursing notes, past medical history, past surgical history, family history and social history. Vital Signs-Reviewed the patient's vital signs. Patient Vitals for the past 12 hrs:   Temp Pulse Resp BP SpO2   03/05/19 1635 98.7 °F (37.1 °C) 71 16 (!) 158/99 100 %       Pulse Oximetry Analysis - 100% on room air    Cardiac Monitor:   Rate: 71 bpm  Rhythm: Normal Sinus Rhythm      Records Reviewed: Nursing Notes, Old Medical Records, Previous Radiology Studies and Previous Laboratory Studies [id number] file    Provider Notes (Medical Decision Making):   DDx: Dizziness, URI, PNA, acute otitis media, cardiac event. ED Course:   Initial assessment performed.  The patients presenting problems have been discussed, and they are in agreement with the care plan formulated and outlined with them. I have encouraged them to ask questions as they arise throughout their visit. PROGRESS NOTE:  6:44 PM  Reviewed pts [id number] file. Written by RALPH Horvath, as dictated by Thompson Schuster PA-C. Critical Care Time:   0 minutes. Disposition:  DISCHARGE NOTE:  8:13 PM  The patient is ready for discharge. The patients signs, symptoms, diagnosis, and instructions for discharge have been discussed and the pt has conveyed their understanding. The patient is to follow up as recommended with PCP or return to the ER should their symptoms worsen. Plan has been discussed and patient has conveyed their agreement. PLAN:  1. Discharge home. Current Discharge Medication List      START taking these medications    Details   dextromethorphan-guaiFENesin (ROBITUSSIN COUGH-CHEST ANAIS DM) 5-100 mg/5 mL liqd Take 10 mL by mouth four (4) times daily as needed for Cough. Qty: 177 mL, Refills: 0      meclizine (ANTIVERT) 25 mg tablet Take 1 Tab by mouth three (3) times daily as needed for Dizziness. Qty: 20 Tab, Refills: 0         STOP taking these medications       oxyCODONE-acetaminophen (PERCOCET) 5-325 mg per tablet Comments:   Reason for Stopping:         methocarbamol (ROBAXIN-750) 750 mg tablet Comments:   Reason for Stopping:         predniSONE (STERAPRED DS) 10 mg dose pack Comments:   Reason for Stopping:         azithromycin (ZITHROMAX Z-ESTELITA) 250 mg tablet Comments:   Reason for Stopping:         dextromethorphan-guaiFENesin (ROBITUSSIN-DM)  mg/5 mL syrup Comments:   Reason for Stoppin.   Follow-up Information     Follow up With Specialties Details Why Contact Info    Aleena Sexton MD Internal Medicine    [facility]  [address]  [address]  [phone]      [facility] EMERGENCY DEPT Emergency Medicine  If symptoms worsen [address] [place] [address]  [phone]        Return to ED if worse     Diagnosis     Clinical Impression:   1.  Acute upper respiratory infection    2. Elevated blood pressure reading    3. Dizziness        Attestations: This note is prepared by Richie Wilson, acting as Scribe for Raphael Sunshine PA-C: The scribe's documentation has been prepared under my direction and personally reviewed by me in its entirety. I confirm that the note above accurately reflects all work, treatment, procedures, and medical decision making performed by me. This note will not be viewable in [address]

## 2019-03-05 NOTE — LETTER
[facility] EMERGENCY DEPT 
[address] [place] [address] [address] [id number] [phone] Work/School Note Date: 3/5/2019 To Whom It May concern: 
 
Xochitl Pan was seen and treated today in the emergency room by the following provider(s): 
Attending Provider: Precious Lehman MD 
Physician Assistant: Crystal Maldonado., PA. Xochitl Pan may return to work on 3/8/19 or sooner, if feeling better. Sincerely, Giorgio Leos PA

## 2019-03-06 LAB
ATRIAL RATE: 61 BPM
CALCULATED P AXIS, ECG09: 35 DEGREES
CALCULATED R AXIS, ECG10: -16 DEGREES
CALCULATED T AXIS, ECG11: 8 DEGREES
DIAGNOSIS, 93000: NORMAL
P-R INTERVAL, ECG05: 156 MS
Q-T INTERVAL, ECG07: 428 MS
QRS DURATION, ECG06: 84 MS
QTC CALCULATION (BEZET), ECG08: 430 MS
VENTRICULAR RATE, ECG03: 61 BPM

## 2019-03-06 NOTE — DISCHARGE INSTRUCTIONS
Patient Education        Dizziness: Care Instructions  Your Care Instructions  Dizziness is the feeling of unsteadiness or fuzziness in your head. It is different than having vertigo, which is a feeling that the room is spinning or that you are moving or falling. It is also different from lightheadedness, which is the feeling that you are about to faint. It can be hard to know what causes dizziness. Some people feel dizzy when they have migraine headaches. Sometimes bouts of flu can make you feel dizzy. Some medical conditions, such as heart problems or high blood pressure, can make you feel dizzy. Many medicines can cause dizziness, including medicines for high blood pressure, pain, or anxiety. If a medicine causes your symptoms, your doctor may recommend that you stop or change the medicine. If it is a problem with your heart, you may need medicine to help your heart work better. If there is no clear reason for your symptoms, your doctor may suggest watching and waiting for a while to see if the dizziness goes away on its own. Follow-up care is a key part of your treatment and safety. Be sure to make and go to all appointments, and call your doctor if you are having problems. It's also a good idea to know your test results and keep a list of the medicines you take. How can you care for yourself at home? · If your doctor recommends or prescribes medicine, take it exactly as directed. Call your doctor if you think you are having a problem with your medicine. · Do not drive while you feel dizzy. · Try to prevent falls. Steps you can take include:  ? Using nonskid mats, adding grab bars near the tub, and using night-lights. ? Clearing your home so that walkways are free of anything you might trip on.  ? Letting family and friends know that you have been feeling dizzy. This will help them know how to help you. When should you call for help? Call 911 anytime you think you may need emergency care.  For example, call if:    · You passed out (lost consciousness).     · You have dizziness along with symptoms of a heart attack. These may include:  ? Chest pain or pressure, or a strange feeling in the chest.  ? Sweating. ? Shortness of breath. ? Nausea or vomiting. ? Pain, pressure, or a strange feeling in the back, neck, jaw, or upper belly or in one or both shoulders or arms. ? Lightheadedness or sudden weakness. ? A fast or irregular heartbeat.     · You have symptoms of a stroke. These may include:  ? Sudden numbness, tingling, weakness, or loss of movement in your face, arm, or leg, especially on only one side of your body. ? Sudden vision changes. ? Sudden trouble speaking. ? Sudden confusion or trouble understanding simple statements. ? Sudden problems with walking or balance. ? A sudden, severe headache that is different from past headaches.    Call your doctor now or seek immediate medical care if:    · You feel dizzy and have a fever, headache, or ringing in your ears.     · You have new or increased nausea and vomiting.     · Your dizziness does not go away or comes back.    Watch closely for changes in your health, and be sure to contact your doctor if:    · You do not get better as expected. Where can you learn more? Go to http://veronica-janene.info/. Enter P870 in the search box to learn more about \"Dizziness: Care Instructions. \"  Current as of: September 23, 2018  Content Version: 11.9  © 1910-5472 [employer]. Care instructions adapted under license by [employer] (which disclaims liability or warranty for this information). If you have questions about a medical condition or this instruction, always ask your healthcare professional. [place] [address] any warranty or liability for your use of this information.          Patient Education        Elevated Blood Pressure: Care Instructions  Your Care Instructions    Blood pressure is a measure of how hard the blood pushes against the walls of your arteries. It's normal for blood pressure to go up and down throughout the day. But if it stays up over time, you have high blood pressure. Two numbers tell you your blood pressure. The first number is the systolic pressure. It shows how hard the blood pushes when your heart is pumping. The second number is the diastolic pressure. It shows how hard the blood pushes between heartbeats, when your heart is relaxed and filling with blood. An ideal blood pressure in adults is less than 120/80 (say \"120 over 80\"). High blood pressure is 140/90 or higher. You have high blood pressure if your top number is 140 or higher or your bottom number is 90 or higher, or both. The main test for high blood pressure is simple, fast, and painless. To diagnose high blood pressure, your doctor will test your blood pressure at different times. After testing your blood pressure, your doctor may ask you to test it again when you are home. If you are diagnosed with high blood pressure, you can work with your doctor to make a long-term plan to manage it. Follow-up care is a key part of your treatment and safety. Be sure to make and go to all appointments, and call your doctor if you are having problems. It's also a good idea to know your test results and keep a list of the medicines you take. How can you care for yourself at home? · Do not smoke. Smoking increases your risk for heart attack and stroke. If you need help quitting, talk to your doctor about stop-smoking programs and medicines. These can increase your chances of quitting for good. · Stay at a healthy weight. · Try to limit how much sodium you eat to less than 2,300 milligrams (mg) a day. Your doctor may ask you to try to eat less than 1,500 mg a day. · Be physically active. Get at least 30 minutes of exercise on most days of the week. Walking is a good choice.  You also may want to do other activities, such as running, swimming, cycling, or playing tennis or team sports. · Avoid or limit alcohol. Talk to your doctor about whether you can drink any alcohol. · Eat plenty of fruits, vegetables, and low-fat dairy products. Eat less saturated and total fats. · Learn how to check your blood pressure at home. When should you call for help? Call your doctor now or seek immediate medical care if:  ? · Your blood pressure is much higher than normal (such as 180/110 or higher). ? · You think high blood pressure is causing symptoms such as:  ¨ Severe headache. ¨ Blurry vision. ? Watch closely for changes in your health, and be sure to contact your doctor if:  ? · You do not get better as expected. Where can you learn more? Go to http://veronica-janene.info/. Enter E041 in the search box to learn more about \"Elevated Blood Pressure: Care Instructions. \"  Current as of: September 21, 2016  Content Version: 11.4  © 4799-1643 [employer]. Care instructions adapted under license by [employer] (which disclaims liability or warranty for this information). If you have questions about a medical condition or this instruction, always ask your healthcare professional. [place] [address] any warranty or liability for your use of this information. Patient Education        Saline Nasal Washes: Care Instructions  Your Care Instructions  Saline nasal washes help keep the nasal passages open by washing out thick or dried mucus. This simple remedy can help relieve symptoms of allergies, sinusitis, and colds. It also can make the nose feel more comfortable by keeping the mucous membranes moist. You may notice a little burning sensation in your nose the first few times you use the solution, but this usually gets better in a few days. Follow-up care is a key part of your treatment and safety. Be sure to make and go to all appointments, and call your doctor if you are having problems.  It's also a good idea to know your test results and keep a list of the medicines you take. How can you care for yourself at home? · You can buy premixed saline solution in a squeeze bottle or other sinus rinse products at a drugstore. Read and follow the instructions on the label. · You also can make your own saline solution by adding 1 teaspoon of salt and 1 teaspoon of baking soda to 2 cups of distilled water. · If you use a homemade solution, pour a small amount into a clean bowl. Using a rubber bulb syringe, squeeze the syringe and place the tip in the salt water. Pull a small amount of the salt water into the syringe by relaxing your hand. · Sit down with your head tilted slightly back. Do not lie down. Put the tip of the bulb syringe or the squeeze bottle a little way into one of your nostrils. Gently drip or squirt a few drops into the nostril. Repeat with the other nostril. Some sneezing and gagging are normal at first.  · Gently blow your nose. · Wipe the syringe or bottle tip clean after each use. · Repeat this 2 or 3 times a day. · Use nasal washes gently if you have nosebleeds often. When should you call for help? Watch closely for changes in your health, and be sure to contact your doctor if:    · You often get nosebleeds.     · You have problems doing the nasal washes. Where can you learn more? Go to http://veronica-janene.info/. Enter 529 [id number] [id number] in the search box to learn more about \"Saline Nasal Washes: Care Instructions. \"  Current as of: March 27, 2018  Content Version: 11.9  © 8182-3352 [employer]. Care instructions adapted under license by [employer] (which disclaims liability or warranty for this information). If you have questions about a medical condition or this instruction, always ask your healthcare professional. [place] [address] any warranty or liability for your use of this information.          Patient Education        Upper Respiratory Infection (Cold): Care Instructions  Your Care Instructions    An upper respiratory infection, or URI, is an infection of the nose, sinuses, or throat. URIs are spread by coughs, sneezes, and direct contact. The common cold is the most frequent kind of URI. The flu and sinus infections are other kinds of URIs. Almost all URIs are caused by viruses. Antibiotics won't cure them. But you can treat most infections with home care. This may include drinking lots of fluids and taking over-the-counter pain medicine. You will probably feel better in 4 to 10 days. The doctor has checked you carefully, but problems can develop later. If you notice any problems or new symptoms, get medical treatment right away. Follow-up care is a key part of your treatment and safety. Be sure to make and go to all appointments, and call your doctor if you are having problems. It's also a good idea to know your test results and keep a list of the medicines you take. How can you care for yourself at home? · To prevent dehydration, drink plenty of fluids, enough so that your urine is light yellow or clear like water. Choose water and other caffeine-free clear liquids until you feel better. If you have kidney, heart, or liver disease and have to limit fluids, talk with your doctor before you increase the amount of fluids you drink. · Take an over-the-counter pain medicine, such as acetaminophen (Tylenol), ibuprofen (Advil, Motrin), or naproxen (Aleve). Read and follow all instructions on the label. · Before you use cough and cold medicines, check the label. These medicines may not be safe for young children or for people with certain health problems. · Be careful when taking over-the-counter cold or flu medicines and Tylenol at the same time. Many of these medicines have acetaminophen, which is Tylenol. Read the labels to make sure that you are not taking more than the recommended dose.  Too much acetaminophen (Tylenol) can be harmful. · Get plenty of rest.  · Do not smoke or allow others to smoke around you. If you need help quitting, talk to your doctor about stop-smoking programs and medicines. These can increase your chances of quitting for good. When should you call for help? Call 911 anytime you think you may need emergency care. For example, call if:    · You have severe trouble breathing.    Call your doctor now or seek immediate medical care if:    · You seem to be getting much sicker.     · You have new or worse trouble breathing.     · You have a new or higher fever.     · You have a new rash.    Watch closely for changes in your health, and be sure to contact your doctor if:    · You have a new symptom, such as a sore throat, an earache, or sinus pain.     · You cough more deeply or more often, especially if you notice more mucus or a change in the color of your mucus.     · You do not get better as expected. Where can you learn more? Go to http://veronica-janene.info/. Enter L556 in the search box to learn more about \"Upper Respiratory Infection (Cold): Care Instructions. \"  Current as of: September 5, 2018  Content Version: 11.9  © 3160-0271 [employer]. Care instructions adapted under license by [employer] (which disclaims liability or warranty for this information). If you have questions about a medical condition or this instruction, always ask your healthcare professional. [place] [address] any warranty or liability for your use of this information.

## 2019-03-06 NOTE — ED NOTES
Mary Grace Brizuela, [address] has reviewed discharge instructions with the patient. The patient verbalized understanding. Pt. A&Ox4, respirations even and unlabored. VS stable as noted in flowsheet. Pt  Declined wheelchair assist from department; paperwork in hand.

## 2019-04-11 ENCOUNTER — HOSPITAL ENCOUNTER (EMERGENCY)
Age: 54
Discharge: HOME | End: 2019-04-11
Attending: EMERGENCY MEDICINE
Payer: SELF-PAY

## 2019-04-11 VITALS
RESPIRATION RATE: 14 BRPM | DIASTOLIC BLOOD PRESSURE: 91 MMHG | OXYGEN SATURATION: 95 % | HEART RATE: 66 BPM | SYSTOLIC BLOOD PRESSURE: 152 MMHG | HEIGHT: 72 IN | TEMPERATURE: 98.3 F | BODY MASS INDEX: 36.82 KG/M2 | WEIGHT: 271.83 LBS

## 2019-04-11 DIAGNOSIS — D22.9 BLEEDING SKIN MOLE: Primary | ICD-10-CM

## 2019-04-11 DIAGNOSIS — R23.3 BLEEDING SKIN MOLE: Primary | ICD-10-CM

## 2019-04-11 PROCEDURE — 99281 EMR DPT VST MAYX REQ PHY/QHP: CPT

## 2019-04-11 PROCEDURE — 75810000454 HC CHEMICAL CAUTERY TISSUE

## 2019-04-11 PROCEDURE — 74011000250 HC RX REV CODE- 250: Performed by: NURSE PRACTITIONER

## 2019-04-11 RX ORDER — SILVER NITRATE 38.21; 12.74 MG/1; MG/1
1 STICK TOPICAL
Status: COMPLETED | OUTPATIENT
Start: 2019-04-11 | End: 2019-04-11

## 2019-04-11 RX ADMIN — SILVER NITRATE APPLICATORS 1 APPLICATOR: 25; 75 STICK TOPICAL at 14:36

## 2019-04-11 NOTE — ED NOTES
Patient discharged by ORLANDO Escalante. Patient provided with discharge instructions Rx and instructions on follow up care. Patient out of ED ambulatory accompanied by family.

## 2019-04-11 NOTE — LETTER
NOTIFICATION RETURN TO WORK / SCHOOL 
 
4/11/2019 3:08 PM 
 
Ms. Steven Funk [address] [place] [id number] To Whom It May Concern: 
 
Steven Funk is currently under the care of [facility] EMERGENCY DEPT. She will return to work/school on: April 11, 2019 If there are questions or concerns please have the patient contact our office.  
 
 
 
Sincerely, 
 
 
 
Obi Toussaint NP 
3:08 PM

## 2019-04-11 NOTE — ED PROVIDER NOTES
EMERGENCY DEPARTMENT HISTORY AND PHYSICAL EXAM 
 
 
Date: 4/11/2019 Patient Name: Viridiana Keller History of Presenting Illness Chief Complaint Patient presents with  Eye Pain  
  pt stated she woke up this morning and she had bleeding next to her right eye where she had previously had a mole. pt stated that everytime she removes the bandaid her eye begins bleeding again History Provided By: Patient HPI: Viridiana Keller, 53 y.o. female with PMHx significant for anemia, arthritis, GERD, bronchitis, chronic back pain, peptic ulcer disease, anxiety, TIA, presents ambulatory to the ED with cc of right eyelid bleeding. She states that she woke up this morning and noted that her mole seemed to be bleeding. She applied a Band-Aid and the bleeding stopped. She took the Band-Aid off at work and the bleeding resumed. She presents to the emergency room during her lunch break for evaluation. There has been no injury to this area. Bleeding is currently under control. Pt denies fevers, chills, night sweats, chest pain, pressure, SOB, SIMON, PND, orthopnea, abdominal pain, n/v/d, melena, hematuria, dysuria, constipation, HA, dizziness, and syncope There are no other complaints, changes, or physical findings at this time. PCP: Hal Sales MD 
 
No current facility-administered medications on file prior to encounter. Current Outpatient Medications on File Prior to Encounter Medication Sig Dispense Refill  dextromethorphan-guaiFENesin (ROBITUSSIN COUGH-CHEST ANAIS DM) 5-100 mg/5 mL liqd Take 10 mL by mouth four (4) times daily as needed for Cough. 177 mL 0  
 meclizine (ANTIVERT) 25 mg tablet Take 1 Tab by mouth three (3) times daily as needed for Dizziness. 20 Tab 0  
 albuterol (PROVENTIL HFA, VENTOLIN HFA, PROAIR HFA) 90 mcg/actuation inhaler Take 2 Puffs by inhalation every four (4) hours as needed for Wheezing.  1 Inhaler 0  
  ALPRAZolam (XANAX) 1 mg tablet Take 1 mg by mouth three (3) times daily as needed for Anxiety. Past History Past Medical History: 
Past Medical History:  
Diagnosis Date  Acute bronchitis Jan 2015  Acute sinusitis  Anxiety  Arthritis  Back pain  Cocaine abuse with cocaine-induced disorder ([facility] [address]  Delivery normal   
 x4  
 Dizziness  Gastrointestinal disorder \"stomach ulcer\"  Other ill-defined conditions(799.89)   
 anemia  Palpitations  Peptic ulcer disease  Sciatica  TIA (transient ischemic attack)  Upper respiratory infection Past Surgical History: 
Past Surgical History:  
Procedure Laterality Date  ABDOMEN SURGERY PROC UNLISTED    
 ulcer  HX GYN    
 tubal ligation  HX HEENT    
 sinus surgery  HX TONSILLECTOMY  HX TONSILLECTOMY Family History: 
Family History Problem Relation Age of Onset  Stroke Mother  Heart Disease Mother Social History: 
Social History Tobacco Use  Smoking status: Former Smoker Packs/day: 0.50 Years: 15.00 Pack years: 7.50 Last attempt to quit: 4/2/2016 Years since quitting: 3.0  Smokeless tobacco: Never Used Substance Use Topics  Alcohol use: No  
 Drug use: No  
 
 
Allergies: Allergies Allergen Reactions  Penicillins Anaphylaxis Has taken cephalexin without problem  Hydrocodone Itching Review of Systems Review of Systems Constitutional: Negative for activity change, appetite change, chills, diaphoresis, fatigue, fever and unexpected weight change. HENT: Negative for congestion, ear pain, rhinorrhea, sinus pressure, sore throat and tinnitus. Right eyelid mole bleeding Eyes: Negative for photophobia, pain, discharge and visual disturbance. Respiratory: Negative for apnea, cough, choking, chest tightness, shortness of breath, wheezing and stridor. Cardiovascular: Negative for chest pain, palpitations and leg swelling. Gastrointestinal: Negative for abdominal pain, constipation, diarrhea, nausea and vomiting. Endocrine: Negative for polydipsia, polyphagia and polyuria. Genitourinary: Negative for decreased urine volume, dyspareunia, dysuria, enuresis, flank pain, frequency, hematuria and urgency. Musculoskeletal: Negative for arthralgias, back pain, gait problem, myalgias and neck pain. Skin: Negative for color change, pallor, rash and wound. Allergic/Immunologic: Negative for immunocompromised state. Neurological: Negative for dizziness, seizures, syncope, weakness, light-headedness and headaches. Hematological: Does not bruise/bleed easily. Psychiatric/Behavioral: Negative for agitation and confusion. The patient is not nervous/anxious. Physical Exam  
Physical Exam  
Constitutional: She is oriented to person, place, and time. She appears well-developed and well-nourished. No distress. HENT:  
Head: Normocephalic. Right Ear: External ear normal.  
Left Ear: External ear normal.  
Mouth/Throat: Oropharynx is clear and moist. No oropharyngeal exudate. Eyes: Pupils are equal, round, and reactive to light. Conjunctivae and EOM are normal. Right eye exhibits no discharge. Left eye exhibits no discharge. No scleral icterus. Neck: Normal range of motion. Neck supple. No JVD present. No tracheal deviation present. No thyromegaly present. Cardiovascular: Normal rate, regular rhythm, normal heart sounds and intact distal pulses. Exam reveals no gallop and no friction rub. No murmur heard. Pulmonary/Chest: Effort normal and breath sounds normal. No stridor. No respiratory distress. She has no wheezes. She has no rales. She exhibits no tenderness. Abdominal: Soft. Bowel sounds are normal. She exhibits no distension and no mass. There is no tenderness. There is no rebound and no guarding. Musculoskeletal: Normal range of motion. She exhibits no edema or tenderness. Lymphadenopathy:  
  She has no cervical adenopathy. Neurological: She is alert and oriented to person, place, and time. She displays normal reflexes. No cranial nerve deficit. Coordination normal.  
Skin: Skin is warm and dry. No rash noted. She is not diaphoretic. No erythema. No pallor. Psychiatric: She has a normal mood and affect. Her behavior is normal. Judgment and thought content normal.  
Nursing note and vitals reviewed. Diagnostic Study Results Labs - No results found for this or any previous visit (from the past 12 hour(s)). Radiologic Studies - No orders to display CT Results  (Last 48 hours) None CXR Results  (Last 48 hours) None Medical Decision Making I am the first provider for this patient. I reviewed the vital signs, available nursing notes, past medical history, past surgical history, family history and social history. Vital Signs-Reviewed the patient's vital signs. Patient Vitals for the past 12 hrs: 
 Temp Pulse Resp BP SpO2  
04/11/19 1255 98.3 °F (36.8 °C) 66 14 (!) 152/91 95 % Pulse Oximetry Analysis - 95% on RA Cardiac Monitor:  
Rate: 66 bpm 
 
Records Reviewed: Nursing Notes, Old Medical Records, Previous electrocardiograms, Previous Radiology Studies and Previous Laboratory Studies Provider Notes (Medical Decision Making):  
Bleeding mole Cauterized Stable ambulatory patient in no acute distress ED Course:  
Initial assessment performed. The patients presenting problems have been discussed, and they are in agreement with the care plan formulated and outlined with them. I have encouraged them to ask questions as they arise throughout their visit. Critical Care Time:  
0 Disposition: 
Discharge to home with primary care physician and dermatology follow-up PLAN: 
1.   
Discharge Medication List as of 4/11/2019  3:16 PM  
 2.  
Follow-up Information Follow up With Specialties Details Why Contact Info Chris Orourke MD Internal Medicine In 2 days  [address] [facility] 7 [address] [phone] [facility] EMERGENCY DEPT Emergency Medicine  As needed, If symptoms worsen [address] [facility] 
[phone] Lm Rushing MD Dermatology In 2 days  [address] [facility] [address] [facility] 
[phone] Return to ED if worse Diagnosis Clinical Impression: 1. Bleeding skin mole Attestations: 
 
Cat Juarez NP 
4:15 PM

## 2019-04-11 NOTE — DISCHARGE INSTRUCTIONS
We hope that we have addressed all of your medical concerns. The examination and treatment you received in the Emergency Department were for an emergent problem and were not intended as complete care. It is important that you follow up with your healthcare provider(s) for ongoing care. If your symptoms worsen or do not improve as expected, and you are unable to reach your usual health care provider(s), you should return to the Emergency Department. [address] participate in nationally recognized quality of care measures. If your blood pressure is greater than 120/80, as reported below, we urge that you seek medical care to address the potential of high blood pressure, commonly known as hypertension. Hypertension can be hereditary or can be caused by certain medical conditions, pain, stress, or \"white coat syndrome. \"       Please make an appointment with your health care provider(s) for follow up of your Emergency Department visit. VITALS:   Patient Vitals for the past 8 hrs:   Temp Pulse Resp BP SpO2   04/11/19 1255 98.3 °F (36.8 °C) 66 14 (!) 152/91 95 %          Thank you for allowing us to provide you with medical care today. We realize that you have many choices for your emergency care needs. Please choose us in the future for any continued health care needs. Yuliya Bartholomew NP            No results found for this or any previous visit (from the past 24 hour(s)). No results found.

## 2019-07-01 ENCOUNTER — APPOINTMENT (OUTPATIENT)
Dept: GENERAL RADIOLOGY | Age: 54
End: 2019-07-01
Attending: PHYSICIAN ASSISTANT
Payer: COMMERCIAL

## 2019-07-01 ENCOUNTER — HOSPITAL ENCOUNTER (EMERGENCY)
Age: 54
Discharge: HOME | End: 2019-07-01
Attending: EMERGENCY MEDICINE
Payer: COMMERCIAL

## 2019-07-01 VITALS
OXYGEN SATURATION: 96 % | RESPIRATION RATE: 16 BRPM | WEIGHT: 255 LBS | DIASTOLIC BLOOD PRESSURE: 88 MMHG | SYSTOLIC BLOOD PRESSURE: 153 MMHG | HEART RATE: 84 BPM | HEIGHT: 72 IN | BODY MASS INDEX: 34.54 KG/M2 | TEMPERATURE: 98.2 F

## 2019-07-01 DIAGNOSIS — S63.614A SPRAIN OF RIGHT RING FINGER, UNSPECIFIED SITE OF FINGER, INITIAL ENCOUNTER: ICD-10-CM

## 2019-07-01 DIAGNOSIS — S80.01XA CONTUSION OF RIGHT KNEE, INITIAL ENCOUNTER: ICD-10-CM
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
## 2019-07-01 PROCEDURE — 73562 X-RAY EXAM OF KNEE 3: CPT

## 2019-07-01 PROCEDURE — 74011250637 HC RX REV CODE- 250/637: Performed by: PHYSICIAN ASSISTANT

## 2019-07-01 PROCEDURE — 73130 X-RAY EXAM OF HAND: CPT

## 2019-07-01 PROCEDURE — 77030008304 HC SPLNT FNGR ALUM DERY -A

## 2019-07-01 PROCEDURE — 99283 EMERGENCY DEPT VISIT LOW MDM: CPT

## 2019-07-01 RX ORDER — NAPROXEN 250 MG/1
500 TABLET ORAL
Status: COMPLETED | OUTPATIENT
Start: 2019-07-01 | End: 2019-07-01

## 2019-07-01 RX ORDER — NAPROXEN 500 MG/1
500 TABLET ORAL 2 TIMES DAILY WITH MEALS
Qty: 20 TAB | Refills: 0 | Status: SHIPPED | OUTPATIENT
Start: 2019-07-01 | End: 2019-07-18

## 2019-07-01 RX ADMIN — NAPROXEN 500 MG: 250 TABLET ORAL at 18:20

## 2019-07-01 NOTE — ED PROVIDER NOTES
EMERGENCY DEPARTMENT HISTORY AND PHYSICAL EXAM      Date: 7/1/2019  Patient Name: Nancy Molina    History of Presenting Illness     Chief Complaint   Patient presents with    Fall       History Provided By: Patient    HPI: Nancy Molina, 53 y.o. female with PMHx significant for stomach ulcer, anemia, arthritis, sciatica, anxiety, TIA, previous cocaine abuse presents ambulatory to the ED with cc of fall from front porch onto cement sidewalk about 4 hours PTA. Pt reports landing on right hand specifically right fourth finger, and right knee. Denies hitting head and LOC. Pt has not taken anything for sx. Denies numbness/tingling, radiating pain. Rates pain 10/10. Describes pain as a throbbing, made worse with movement. There are no other complaints, changes, or physical findings at this time. PCP: Hamzah Dumont MD    No current facility-administered medications on file prior to encounter. Current Outpatient Medications on File Prior to Encounter   Medication Sig Dispense Refill    dextromethorphan-guaiFENesin (ROBITUSSIN COUGH-CHEST ANAIS DM) 5-100 mg/5 mL liqd Take 10 mL by mouth four (4) times daily as needed for Cough. 177 mL 0    meclizine (ANTIVERT) 25 mg tablet Take 1 Tab by mouth three (3) times daily as needed for Dizziness. 20 Tab 0    albuterol (PROVENTIL HFA, VENTOLIN HFA, PROAIR HFA) 90 mcg/actuation inhaler Take 2 Puffs by inhalation every four (4) hours as needed for Wheezing. 1 Inhaler 0    ALPRAZolam (XANAX) 1 mg tablet Take 1 mg by mouth three (3) times daily as needed for Anxiety.          Past History     Past Medical History:  Past Medical History:   Diagnosis Date    Acute bronchitis Jan 2015    Acute sinusitis     Anxiety     Arthritis     Back pain     Cocaine abuse with cocaine-induced disorder (HCC)     Delivery normal     x4    Dizziness     Gastrointestinal disorder     \"stomach ulcer\"    Other ill-defined conditions(815.76)     anemia    Palpitations     Peptic ulcer disease     Sciatica     TIA (transient ischemic attack)     Upper respiratory infection        Past Surgical History:  Past Surgical History:   Procedure Laterality Date    ABDOMEN SURGERY PROC UNLISTED      ulcer    HX GYN      tubal ligation    HX HEENT      sinus surgery    HX TONSILLECTOMY      HX TONSILLECTOMY         Family History:  Family History   Problem Relation Age of Onset    Stroke Mother     Heart Disease Mother        Social History:  Social History     Tobacco Use    Smoking status: Former Smoker     Packs/day: 0.50     Years: 15.00     Pack years: 7.50     Last attempt to quit: 4/2/2016     Years since quitting: 3.2    Smokeless tobacco: Never Used   Substance Use Topics    Alcohol use: No    Drug use: No       Allergies: Allergies   Allergen Reactions    Penicillins Anaphylaxis     Has taken cephalexin without problem    Hydrocodone Itching         Review of Systems   Review of Systems   Constitutional: Negative for chills and fever. Respiratory: Negative for shortness of breath. Cardiovascular: Negative for chest pain. Gastrointestinal: Negative for abdominal pain, nausea and vomiting. Genitourinary: Negative for flank pain. Musculoskeletal: Positive for arthralgias. Negative for back pain and myalgias. Right fourth finger, right knee     Skin: Negative for color change, pallor, rash and wound. Neurological: Negative for dizziness, weakness and light-headedness. All other systems reviewed and are negative. Physical Exam   Physical Exam   Constitutional: She is oriented to person, place, and time. She appears well-developed and well-nourished. No distress. HENT:   Head: Normocephalic and atraumatic. Eyes: Conjunctivae are normal.   Cardiovascular: Normal rate, regular rhythm and normal heart sounds. Pulmonary/Chest: Effort normal and breath sounds normal. No respiratory distress. Abdominal: Soft.  Bowel sounds are normal. She exhibits no distension. Musculoskeletal:        Right hip: Normal.        Right knee: She exhibits swelling. She exhibits no bony tenderness. Tenderness found. No patellar tendon tenderness noted. Right hand: She exhibits tenderness, bony tenderness and swelling. Normal sensation noted. Normal strength noted. Hands:  <3 sec cap refill   Neurological: She is alert and oriented to person, place, and time. Skin: Skin is warm. No rash noted. Psychiatric: She has a normal mood and affect. Her behavior is normal.   Nursing note and vitals reviewed. Diagnostic Study Results     Labs -   No results found for this or any previous visit (from the past 12 hour(s)). Radiologic Studies -   XR KNEE RT 3 V   Final Result   IMPRESSION: No acute abnormality. Osteoarthritis. XR HAND RT MIN 3 V   Final Result   IMPRESSION: No fracture. Soft tissue swelling fourth digit. CT Results  (Last 48 hours)    None        CXR Results  (Last 48 hours)    None            Medical Decision Making   I am the first provider for this patient. I reviewed the vital signs, available nursing notes, past medical history, past surgical history, family history and social history. Vital Signs-Reviewed the patient's vital signs. Patient Vitals for the past 12 hrs:   Temp Pulse Resp BP SpO2   07/01/19 1741 98.2 °F (36.8 °C) 84 16 153/88 96 %         Records Reviewed: Nursing Notes and Old Medical Records    Provider Notes (Medical Decision Making):   DDX: Knee contusion vs fracture, finger fracture vs sprain vs dislocation, fall    ED Course:   Initial assessment performed. The patients presenting problems have been discussed, and they are in agreement with the care plan formulated and outlined with them. I have encouraged them to ask questions as they arise throughout their visit. Splint applied to finger. NVI intact after application.        Disposition:  Discussed imaging results with pt along with dx and treatment plan. Discussed importance of ortho follow up. All questions answered. Pt voiced they understood. Return if sx worsen. PLAN:  1. Discharge Medication List as of 7/1/2019  8:11 PM        2. Follow-up Information     Follow up With Specialties Details Why Contact Info    Kristen Hyatt MD Hand Surgery, General Surgery Schedule an appointment as soon as possible for a visit in 1 day  [address]  [id number]  [address] [place] [address]  Schedule an appointment as soon as possible for a visit in 1 day  [address]  [address] [place] [address] [place]  [phone]        Return to ED if worse     Diagnosis     Clinical Impression:   1. Fall, initial encounter    2. Sprain of right ring finger, unspecified site of finger, initial encounter    3.  Contusion of right knee, initial encounter

## 2019-07-01 NOTE — ED NOTES
Verbal shift change report given to Andrzej Yeh RN (oncoming nurse) by Sue Wick (offgoing nurse). Report included the following information SBAR.

## 2019-07-01 NOTE — ED NOTES
Pt S/P GLF with injury to rt 4th finger and rt knee and hip. Pt is A+Ox3 clear speaking. + deformity to rt 4th finger . Emergency Department Nursing Plan of Care       The Nursing Plan of Care is developed from the Nursing assessment and Emergency Department Attending provider initial evaluation. The plan of care may be reviewed in the ED Provider note.     The Plan of Care was developed with the following considerations:   Patient / Family readiness to learn indicated by:verbalized understanding  Persons(s) to be included in education: patient  Barriers to Learning/Limitations:No    Signed     Karla Pollard RN    7/1/2019   5:58 PM

## 2019-07-01 NOTE — ED NOTES
Pt resting in stretcher quietly. States her pain has decreased with the naproxen. No needs expressed at this time.

## 2019-07-01 NOTE — LETTER
[facility] EMERGENCY DEPT 
[address] [place] [address] 
[phone] Work/School Note Date: 7/1/2019 To Whom It May concern: 
 
Adalberto Pickard was seen and treated today in the emergency room by the following provider(s): 
Attending Provider: Richard Rangel MD 
Physician Assistant: HAYDEE Harvey. Adalberto Pickard may return to work on 7/3/2019. Sincerely, HAYDEE Zarco

## 2019-07-01 NOTE — ED TRIAGE NOTES
Pt c/o fall from the 3rd step of her front porch onto cement sidewalk. Fall occurred around 145 today. Pt c/o pain in 4th digit R hand, with visible swelling and bruising. Pt also c/o R knee and hip pain. Denies LOC.

## 2019-07-02 NOTE — DISCHARGE INSTRUCTIONS
Patient Education        Contusion: Care Instructions  Your Care Instructions    Contusion is the medical term for a bruise. It is the result of a direct blow or an impact, such as a fall. Contusions are common sports injuries. Most people think of a bruise as a black-and-blue spot. This happens when small blood vessels get torn and leak blood under the skin. But bones, muscles, and organs can also get bruised. This may damage deep tissues but not cause a bruise you can see. The doctor will do a physical exam to find the location of your contusion. You may also have tests to make sure you do not have a more serious injury, such as a broken bone or nerve damage. These may include X-rays or other imaging tests like a CT scan or MRI. Deep-tissue contusions may cause pain and swelling. But if there is no serious damage, they will often get better in a few weeks with home treatment. The doctor has checked you carefully, but problems can develop later. If you notice any problems or new symptoms, get medical treatment right away. Follow-up care is a key part of your treatment and safety. Be sure to make and go to all appointments, and call your doctor if you are having problems. It's also a good idea to know your test results and keep a list of the medicines you take. How can you care for yourself at home? · Put ice or a cold pack on the sore area for 10 to 20 minutes at a time to stop swelling. Put a thin cloth between the ice pack and your skin. · Be safe with medicines. Read and follow all instructions on the label. ? If the doctor gave you a prescription medicine for pain, take it as prescribed. ? If you are not taking a prescription pain medicine, ask your doctor if you can take an over-the-counter medicine. · If you can, prop up the sore area on pillows as much as possible for the next few days. Try to keep the sore area above the level of your heart. When should you call for help?   Call your doctor now or seek immediate medical care if:    · Your pain gets worse.     · You have new or worse swelling.     · You have tingling, weakness, or numbness in the area near the contusion.     · The area near the contusion is cold or pale.    Watch closely for changes in your health, and be sure to contact your doctor if:    · You do not get better as expected. Where can you learn more? Go to http://veronica-janene.info/. Enter D304 in the search box to learn more about \"Contusion: Care Instructions. \"  Current as of: September 23, 2018  Content Version: 11.9  © 1451-4237 [employer]. Care instructions adapted under license by [employer] (which disclaims liability or warranty for this information). If you have questions about a medical condition or this instruction, always ask your healthcare professional. [address] any warranty or liability for your use of this information. Patient Education        Finger Sprain: Care Instructions  Overview    A sprain is an injury to the tough fibers (ligaments) that connect bone to bone. This injury can happen in joints such as in your finger. Some sprains stretch the ligaments but don't tear them. More severe sprains can partly or completely tear the ligaments. Sprains can cause pain and swelling. It may take weeks to months before your finger can move easily and without pain. Resting the finger for a short time after the injury can help you heal. To keep the injured finger in position while it heals, your doctor may have put a splint on it. Or the doctor may have taped the finger to the one next to it. After the pain and swelling have gone down, your doctor may recommend exercises to strengthen your finger or more treatment if needed. Follow-up care is a key part of your treatment and safety. Be sure to make and go to all appointments, and call your doctor if you are having problems.  It's also a good idea to know your test results and keep a list of the medicines you take. How can you care for yourself at home? · If your doctor put a splint on your finger, wear the splint as directed. Don't remove it until your doctor says it's okay. · If your fingers are taped together, make sure that the tape is snug. But it shouldn't be so tight that the fingers get numb or tingle. You can loosen the tape if it's too tight. If you need to retape your fingers, always put padding between the fingers before you put on the new tape. · Put ice or a cold pack on your finger for 10 to 20 minutes at a time. Try to do this every 1 to 2 hours for the first 3 days (when you are awake) or until the swelling goes down. Put a thin cloth between the ice and your skin. · Prop up your hand on a pillow when you ice it or anytime you sit or lie down during the next 3 days. Try to keep it above the level of your heart. This will help reduce swelling. · Be safe with medicines. Read and follow all instructions on the label. ? If the doctor gave you a prescription medicine for pain, take it as prescribed. ? If you are not taking a prescription pain medicine, ask your doctor if you can take an over-the-counter medicine. · If your doctor recommends exercises, do them as directed. When should you call for help? Call your doctor now or seek immediate medical care if:    · You have new or worse pain.     · Your finger is cool or pale or changes color.     · Your finger is tingly, weak, or numb.    Watch closely for changes in your health, and be sure to contact your doctor if:    · You do not get better as expected. Where can you learn more? Go to http://veronica-janene.info/. Enter F155 in the search box to learn more about \"Finger Sprain: Care Instructions. \"  Current as of: September 20, 2018  Content Version: 11.9  © 3011-8797 [employer].  Care instructions adapted under license by [employer] (which disclaims liability or warranty for this information). If you have questions about a medical condition or this instruction, always ask your healthcare professional. [address] any warranty or liability for your use of this information.

## 2019-07-02 NOTE — ED NOTES
..Discharge summary and discharge medications reviewed with patient and appropriate educational materials and side effects teaching were provided. patient  Given 0 paper prescriptions and 2 electronic prescriptions sent to pt's listed pharmacy. Patient  verbalized understanding of the importance of discussing medications with his or her physician or clinic they will be following up with. No si/s of acute distress prior to discharge. Patient offered wheelchair from treatment area to hospital entrance, patient declined wheelchair. Provided with a work note.

## 2019-07-17 PROCEDURE — 99283 EMERGENCY DEPT VISIT LOW MDM: CPT

## 2019-07-17 PROCEDURE — 93005 ELECTROCARDIOGRAM TRACING: CPT

## 2019-07-18 ENCOUNTER — APPOINTMENT (OUTPATIENT)
Dept: GENERAL RADIOLOGY | Age: 54
End: 2019-07-18
Attending: EMERGENCY MEDICINE
Payer: COMMERCIAL

## 2019-07-18 ENCOUNTER — HOSPITAL ENCOUNTER (EMERGENCY)
Age: 54
Discharge: HOME | End: 2019-07-18
Attending: EMERGENCY MEDICINE
Payer: COMMERCIAL

## 2019-07-18 VITALS
SYSTOLIC BLOOD PRESSURE: 129 MMHG | HEIGHT: 72 IN | OXYGEN SATURATION: 97 % | RESPIRATION RATE: 18 BRPM | DIASTOLIC BLOOD PRESSURE: 92 MMHG | WEIGHT: 268.96 LBS | BODY MASS INDEX: 36.43 KG/M2 | HEART RATE: 60 BPM | TEMPERATURE: 97.9 F

## 2019-07-18 DIAGNOSIS — R06.02 SOB (SHORTNESS OF BREATH): ICD-10-CM

## 2019-07-18 DIAGNOSIS — J20.9 ACUTE BRONCHITIS, UNSPECIFIED ORGANISM: Primary | ICD-10-CM

## 2019-07-18 LAB
ALBUMIN SERPL-MCNC: 3.6 G/DL (ref 3.5–5)
ALBUMIN/GLOB SERPL: 0.8 {RATIO} (ref 1.1–2.2)
ALP SERPL-CCNC: 118 U/L (ref 45–117)
ALT SERPL-CCNC: 20 U/L (ref 12–78)
ANION GAP SERPL CALC-SCNC: 6 MMOL/L (ref 5–15)
AST SERPL-CCNC: 16 U/L (ref 15–37)
ATRIAL RATE: 70 BPM
BASOPHILS # BLD: 0.1 K/UL (ref 0–0.1)
BASOPHILS NFR BLD: 1 % (ref 0–1)
BILIRUB SERPL-MCNC: 0.3 MG/DL (ref 0.2–1)
BUN SERPL-MCNC: 13 MG/DL (ref 6–20)
BUN/CREAT SERPL: 16 (ref 12–20)
CALCIUM SERPL-MCNC: 9 MG/DL (ref 8.5–10.1)
CALCULATED P AXIS, ECG09: 20 DEGREES
CALCULATED R AXIS, ECG10: -5 DEGREES
CALCULATED T AXIS, ECG11: 26 DEGREES
CHLORIDE SERPL-SCNC: 107 MMOL/L (ref 97–108)
CK SERPL-CCNC: 161 U/L (ref 26–192)
CO2 SERPL-SCNC: 28 MMOL/L (ref 21–32)
CREAT SERPL-MCNC: 0.83 MG/DL (ref 0.55–1.02)
DIAGNOSIS, 93000: NORMAL
DIFFERENTIAL METHOD BLD: NORMAL
EOSINOPHIL # BLD: 0.3 K/UL (ref 0–0.4)
EOSINOPHIL NFR BLD: 4 % (ref 0–7)
ERYTHROCYTE [DISTWIDTH] IN BLOOD BY AUTOMATED COUNT: 13.8 % (ref 11.5–14.5)
GLOBULIN SER CALC-MCNC: 4.6 G/DL (ref 2–4)
GLUCOSE SERPL-MCNC: 101 MG/DL (ref 65–100)
HCT VFR BLD AUTO: 37 % (ref 35–47)
HGB BLD-MCNC: 12.3 G/DL (ref 11.5–16)
IMM GRANULOCYTES # BLD AUTO: 0 K/UL (ref 0–0.04)
IMM GRANULOCYTES NFR BLD AUTO: 0 % (ref 0–0.5)
LYMPHOCYTES # BLD: 3.3 K/UL (ref 0.8–3.5)
LYMPHOCYTES NFR BLD: 49 % (ref 12–49)
MCH RBC QN AUTO: 27.7 PG (ref 26–34)
MCHC RBC AUTO-ENTMCNC: 33.2 G/DL (ref 30–36.5)
MCV RBC AUTO: 83.3 FL (ref 80–99)
MONOCYTES # BLD: 0.5 K/UL (ref 0–1)
MONOCYTES NFR BLD: 8 % (ref 5–13)
NEUTS SEG # BLD: 2.5 K/UL (ref 1.8–8)
NEUTS SEG NFR BLD: 38 % (ref 32–75)
NRBC # BLD: 0 K/UL (ref 0–0.01)
NRBC BLD-RTO: 0 PER 100 WBC
P-R INTERVAL, ECG05: 182 MS
PLATELET # BLD AUTO: 303 K/UL (ref 150–400)
PMV BLD AUTO: 9.9 FL (ref 8.9–12.9)
POTASSIUM SERPL-SCNC: 3.6 MMOL/L (ref 3.5–5.1)
PROT SERPL-MCNC: 8.2 G/DL (ref 6.4–8.2)
Q-T INTERVAL, ECG07: 404 MS
QRS DURATION, ECG06: 84 MS
QTC CALCULATION (BEZET), ECG08: 436 MS
RBC # BLD AUTO: 4.44 M/UL (ref 3.8–5.2)
SODIUM SERPL-SCNC: 141 MMOL/L (ref 136–145)
TROPONIN I SERPL-MCNC: <0.05 NG/ML
VENTRICULAR RATE, ECG03: 70 BPM
WBC # BLD AUTO: 6.6 K/UL (ref 3.6–11)

## 2019-07-18 PROCEDURE — 80053 COMPREHEN METABOLIC PANEL: CPT

## 2019-07-18 PROCEDURE — 71046 X-RAY EXAM CHEST 2 VIEWS: CPT

## 2019-07-18 PROCEDURE — 74011000250 HC RX REV CODE- 250: Performed by: EMERGENCY MEDICINE

## 2019-07-18 PROCEDURE — 84484 ASSAY OF TROPONIN QUANT: CPT

## 2019-07-18 PROCEDURE — 77030029684 HC NEB SM VOL KT MONA -A

## 2019-07-18 PROCEDURE — 82550 ASSAY OF CK (CPK): CPT

## 2019-07-18 PROCEDURE — 74011250636 HC RX REV CODE- 250/636: Performed by: EMERGENCY MEDICINE

## 2019-07-18 PROCEDURE — 85025 COMPLETE CBC W/AUTO DIFF WBC: CPT

## 2019-07-18 PROCEDURE — 36415 COLL VENOUS BLD VENIPUNCTURE: CPT

## 2019-07-18 PROCEDURE — 94640 AIRWAY INHALATION TREATMENT: CPT

## 2019-07-18 PROCEDURE — 96374 THER/PROPH/DIAG INJ IV PUSH: CPT

## 2019-07-18 RX ORDER — PREDNISONE 20 MG/1
20 TABLET ORAL DAILY
Qty: 5 TAB | Refills: 0 | Status: SHIPPED | OUTPATIENT
Start: 2019-07-18 | End: 2019-07-23

## 2019-07-18 RX ORDER — IPRATROPIUM BROMIDE AND ALBUTEROL SULFATE 2.5; .5 MG/3ML; MG/3ML
3 SOLUTION RESPIRATORY (INHALATION)
Status: COMPLETED | OUTPATIENT
Start: 2019-07-18 | End: 2019-07-18

## 2019-07-18 RX ORDER — PANTOPRAZOLE SODIUM 40 MG/1
40 TABLET, DELAYED RELEASE ORAL DAILY
COMMUNITY

## 2019-07-18 RX ORDER — ALBUTEROL SULFATE 90 UG/1
2 AEROSOL, METERED RESPIRATORY (INHALATION)
Qty: 1 INHALER | Refills: 0 | Status: SHIPPED | OUTPATIENT
Start: 2019-07-18

## 2019-07-18 RX ADMIN — IPRATROPIUM BROMIDE AND ALBUTEROL SULFATE 3 ML: .5; 3 SOLUTION RESPIRATORY (INHALATION) at 03:00

## 2019-07-18 RX ADMIN — METHYLPREDNISOLONE SODIUM SUCCINATE 125 MG: 125 INJECTION, POWDER, FOR SOLUTION INTRAMUSCULAR; INTRAVENOUS at 03:00

## 2019-07-18 NOTE — ED TRIAGE NOTES
Cough shortness of breath this evening. Used inhaler without relief. Lower rib pain bilaterally. Denies fever.

## 2019-07-18 NOTE — LETTER
[address] 
[facility] EMERGENCY DEPT 
[address] [place] [id number] [phone] Work/School Note Date: 7/17/2019 To Whom It May concern: 
 
Jaylene Maurer was seen and treated today in the emergency room by the following provider(s): 
Attending Provider: Jimbo Urbina MD.   
 
Jaylene Maurer may return to work on July 19, 2019. Sincerely, Clarisa Smith MD

## 2019-07-18 NOTE — DISCHARGE INSTRUCTIONS
Patient Education        Bronchitis: Care Instructions  Your Care Instructions    Bronchitis is inflammation of the bronchial tubes, which carry air to the lungs. The tubes swell and produce mucus, or phlegm. The mucus and inflamed bronchial tubes make you cough. You may have trouble breathing. Most cases of bronchitis are caused by viruses like those that cause colds. Antibiotics usually do not help and they may be harmful. Bronchitis usually develops rapidly and lasts about 2 to 3 weeks in otherwise healthy people. Follow-up care is a key part of your treatment and safety. Be sure to make and go to all appointments, and call your doctor if you are having problems. It's also a good idea to know your test results and keep a list of the medicines you take. How can you care for yourself at home? · Take all medicines exactly as prescribed. Call your doctor if you think you are having a problem with your medicine. · Get some extra rest.  · Take an over-the-counter pain medicine, such as acetaminophen (Tylenol), ibuprofen (Advil, Motrin), or naproxen (Aleve) to reduce fever and relieve body aches. Read and follow all instructions on the label. · Do not take two or more pain medicines at the same time unless the doctor told you to. Many pain medicines have acetaminophen, which is Tylenol. Too much acetaminophen (Tylenol) can be harmful. · Take an over-the-counter cough medicine that contains dextromethorphan to help quiet a dry, hacking cough so that you can sleep. Avoid cough medicines that have more than one active ingredient. Read and follow all instructions on the label. · Breathe moist air from a humidifier, hot shower, or sink filled with hot water. The heat and moisture will thin mucus so you can cough it out. · Do not smoke. Smoking can make bronchitis worse. If you need help quitting, talk to your doctor about stop-smoking programs and medicines.  These can increase your chances of quitting for good.  When should you call for help? Call 911 anytime you think you may need emergency care. For example, call if:    · You have severe trouble breathing.    Call your doctor now or seek immediate medical care if:    · You have new or worse trouble breathing.     · You cough up dark brown or bloody mucus (sputum).     · You have a new or higher fever.     · You have a new rash.    Watch closely for changes in your health, and be sure to contact your doctor if:    · You cough more deeply or more often, especially if you notice more mucus or a change in the color of your mucus.     · You are not getting better as expected. Where can you learn more? Go to http://veronica-janene.info/. Enter H333 in the search box to learn more about \"Bronchitis: Care Instructions. \"  Current as of: September 5, 2018  Content Version: 11.9  © 1094-6681 [employer]. Care instructions adapted under license by [employer] (which disclaims liability or warranty for this information). If you have questions about a medical condition or this instruction, always ask your healthcare professional. [place] [address] any warranty or liability for your use of this information. Patient Education        Shortness of Breath: Care Instructions  Your Care Instructions  Shortness of breath has many causes. Sometimes conditions such as anxiety can lead to shortness of breath. Some people get mild shortness of breath when they exercise. Trouble breathing also can be a symptom of a serious problem, such as asthma, lung disease, emphysema, heart problems, and pneumonia. If your shortness of breath continues, you may need tests and treatment. Watch for any changes in your breathing and other symptoms. Follow-up care is a key part of your treatment and safety. Be sure to make and go to all appointments, and call your doctor if you are having problems.  It's also a good idea to know your test results and keep a list of the medicines you take. How can you care for yourself at home? · Do not smoke or allow others to smoke around you. If you need help quitting, talk to your doctor about stop-smoking programs and medicines. These can increase your chances of quitting for good. · Get plenty of rest and sleep. · Take your medicines exactly as prescribed. Call your doctor if you think you are having a problem with your medicine. · Find healthy ways to deal with stress. ? Exercise daily. ? Get plenty of sleep. ? Eat regularly and well. When should you call for help? Call 911 anytime you think you may need emergency care. For example, call if:    · You have severe shortness of breath.     · You have symptoms of a heart attack. These may include:  ? Chest pain or pressure, or a strange feeling in the chest.  ? Sweating. ? Shortness of breath. ? Nausea or vomiting. ? Pain, pressure, or a strange feeling in the back, neck, jaw, or upper belly or in one or both shoulders or arms. ? Lightheadedness or sudden weakness. ? A fast or irregular heartbeat. After you call 911, the  may tell you to chew 1 adult-strength or 2 to 4 low-dose aspirin. Wait for an ambulance. Do not try to drive yourself.    Call your doctor now or seek immediate medical care if:    · Your shortness of breath gets worse or you start to wheeze. Wheezing is a high-pitched sound when you breathe.     · You wake up at night out of breath or have to prop your head up on several pillows to breathe.     · You are short of breath after only light activity or while at rest.    Watch closely for changes in your health, and be sure to contact your doctor if:    · You do not get better over the next 1 to 2 days. Where can you learn more? Go to http://veronica-janene.info/. Enter S780 in the search box to learn more about \"Shortness of Breath: Care Instructions. \"  Current as of: September 5, 2018  Content Version: 11.9  © 0866-1273 [employer]. Care instructions adapted under license by [employer] (which disclaims liability or warranty for this information). If you have questions about a medical condition or this instruction, always ask your healthcare professional. [address] any warranty or liability for your use of this information.

## 2019-07-21 NOTE — ED PROVIDER NOTES
EMERGENCY DEPARTMENT HISTORY AND PHYSICAL EXAM      Date: 7/18/2019  Patient Name: Ольга Squires    History of Presenting Illness     Chief Complaint   Patient presents with    Shortness of Breath       History Provided By: Patient    HPI: Ольга Squires, 53 y.o. female with PMHx significant for bronchitis and palpitations, presents    to the ED with cc of shortness of breath. The patient has had shortness of breath and a cough this evening. The cough is nonproductive, and is associated with bilateral lower rib pain. The pain is a 9 out of 10 in severity. The patient denies fever, chills, nausea or vomiting. She also denies leg pain or leg edema. Her lower rib pain is constant. There are no other complaints, changes, or physical findings at this time. PCP: Romina Vasquez MD    No current facility-administered medications on file prior to encounter. Current Outpatient Medications on File Prior to Encounter   Medication Sig Dispense Refill    pantoprazole (PROTONIX) 40 mg tablet Take 40 mg by mouth daily.  ALPRAZolam (XANAX) 1 mg tablet Take 1 mg by mouth three (3) times daily as needed for Anxiety.          Past History     Past Medical History:  Past Medical History:   Diagnosis Date    Acute bronchitis Jan 2015    Acute sinusitis     Anxiety     Arthritis     Back pain     Cocaine abuse with cocaine-induced disorder (HCC)     Delivery normal     x4    Dizziness     Gastrointestinal disorder     \"stomach ulcer\"    Other ill-defined conditions(319.89)     anemia    Palpitations     Peptic ulcer disease     Sciatica     TIA (transient ischemic attack)     Upper respiratory infection        Past Surgical History:  Past Surgical History:   Procedure Laterality Date    ABDOMEN SURGERY PROC UNLISTED      ulcer    HX GYN      tubal ligation    HX HEENT      sinus surgery    HX TONSILLECTOMY      HX TONSILLECTOMY         Family History:  Family History   Problem Relation Age of Onset    Stroke Mother     Heart Disease Mother        Social History:  Social History     Tobacco Use    Smoking status: Former Smoker     Packs/day: 0.50     Years: 15.00     Pack years: 7.50     Last attempt to quit: 4/2/2016     Years since quitting: 3.3    Smokeless tobacco: Never Used   Substance Use Topics    Alcohol use: No    Drug use: No       Allergies: Allergies   Allergen Reactions    Penicillins Anaphylaxis     Has taken cephalexin without problem    Hydrocodone Itching         Review of Systems   Review of Systems   Constitutional: Negative for fever. HENT: Negative for congestion. Eyes: Negative for visual disturbance. Respiratory: Positive for cough and shortness of breath. Cardiovascular: Negative for chest pain. Gastrointestinal: Negative for abdominal pain. Endocrine: Negative for polyuria. Genitourinary: Negative for flank pain. Musculoskeletal: Negative for back pain. Skin: Negative for rash. Allergic/Immunologic: Negative for immunocompromised state. Neurological: Negative for weakness. Hematological: Negative for adenopathy. Psychiatric/Behavioral: Negative for agitation. All other systems reviewed and are negative. Physical Exam   Physical Exam   Constitutional: She is oriented to person, place, and time. She appears well-developed and well-nourished. No distress. HENT:   Head: Normocephalic and atraumatic. Eyes: Pupils are equal, round, and reactive to light. EOM are normal.   Neck: Normal range of motion. Neck supple. Cardiovascular: Normal rate, regular rhythm, normal heart sounds and intact distal pulses. Pulmonary/Chest: Effort normal and breath sounds normal. No respiratory distress. She exhibits tenderness. Abdominal: Soft. Bowel sounds are normal. She exhibits no mass. There is no tenderness. Musculoskeletal: Normal range of motion. She exhibits no edema. Neurological: She is alert and oriented to person, place, and time. Coordination normal.   Skin: Skin is warm and dry. Psychiatric: She has a normal mood and affect. Her behavior is normal.   Nursing note and vitals reviewed. Diagnostic Study Results     Labs -   No results found for this or any previous visit (from the past 12 hour(s)). Radiologic Studies -   XR CHEST PA LAT   Final Result   IMPRESSION:      Normal chest views. No significant change. CT Results  (Last 48 hours)    None        CXR Results  (Last 48 hours)    None            Medical Decision Making   I am the first provider for this patient. I reviewed the vital signs, available nursing notes, past medical history, past surgical history, family history and social history. Vital Signs-Reviewed the patient's vital signs. No data found. Pulse Oximetry Analysis - 98% on room air    Cardiac Monitor:   Rate: 64 bpm  Rhythm: Normal Sinus Rhythm      EKG interpretation: (Preliminary)  Rhythm: normal sinus rhythm; and regular . Rate (approx.): 70; Axis: normal; WY interval: normal; QRS interval: normal ; ST/T wave: non-specific changes; Other findings: unchanged from previous ekg. Records Reviewed: Nursing Notes, Old Medical Records, Previous electrocardiograms, Previous Radiology Studies and Previous Laboratory Studies    Provider Notes (Medical Decision Making):   Bronchitis, pneumonia, upper respiratory infection, CAD, costochondritis, reflux    ED Course:   Initial assessment performed. The patients presenting problems have been discussed, and they are in agreement with the care plan formulated and outlined with them. I have encouraged them to ask questions as they arise throughout their visit. Progress note: The patient is feeling better. Her results were reviewed. She is advised to follow-up and return to ER if worse    Critical Care Time:   0    Disposition:  home    PLAN:  1.    Discharge Medication List as of 7/18/2019  3:58 AM      START taking these medications    Details predniSONE (DELTASONE) 20 mg tablet Take 20 mg by mouth daily for 5 days. With Breakfast, Normal, Disp-5 Tab, R-0         CONTINUE these medications which have CHANGED    Details   albuterol (PROVENTIL HFA, VENTOLIN HFA, PROAIR HFA) 90 mcg/actuation inhaler Take 2 Puffs by inhalation every four (4) hours as needed for Wheezing., Normal, Disp-1 Inhaler, R-0         CONTINUE these medications which have NOT CHANGED    Details   pantoprazole (PROTONIX) 40 mg tablet Take 40 mg by mouth daily. , Historical Med      ALPRAZolam (XANAX) 1 mg tablet Take 1 mg by mouth three (3) times daily as needed for Anxiety. , Historical Med           2. Follow-up Information     Follow up With Specialties Details Why Contact Info    Won Jolley MD Family Practice In 4 days As needed [address] [address] [address] [facility]  [phone]      [facility] EMERGENCY DEPT Emergency Medicine  If symptoms worsen [address] [place], [place] [address] [facility]  [phone]        Return to ED if worse     Diagnosis     Clinical Impression:   1. Acute bronchitis, unspecified organism    2. SOB (shortness of breath)        Attestations:     This chart was completed by myself, Dr. José Miguel Headley

## 2019-09-15 ENCOUNTER — APPOINTMENT (OUTPATIENT)
Dept: GENERAL RADIOLOGY | Age: 54
End: 2019-09-15
Attending: PHYSICIAN ASSISTANT
Payer: COMMERCIAL

## 2019-09-15 ENCOUNTER — HOSPITAL ENCOUNTER (EMERGENCY)
Age: 54
Discharge: HOME | End: 2019-09-15
Attending: EMERGENCY MEDICINE
Payer: COMMERCIAL

## 2019-09-15 VITALS
WEIGHT: 250 LBS | HEIGHT: 72 IN | BODY MASS INDEX: 33.86 KG/M2 | RESPIRATION RATE: 18 BRPM | TEMPERATURE: 99.1 F | HEART RATE: 84 BPM | DIASTOLIC BLOOD PRESSURE: 95 MMHG | SYSTOLIC BLOOD PRESSURE: 148 MMHG | OXYGEN SATURATION: 97 %

## 2019-09-15 DIAGNOSIS — S16.1XXA STRAIN OF NECK MUSCLE, INITIAL ENCOUNTER: ICD-10-CM

## 2019-09-15 DIAGNOSIS — S80.02XA CONTUSION OF LEFT KNEE, INITIAL ENCOUNTER: Primary | ICD-10-CM
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
## 2019-09-15 DIAGNOSIS — G44.319 ACUTE POST-TRAUMATIC HEADACHE, NOT INTRACTABLE: ICD-10-CM

## 2019-09-15 DIAGNOSIS — S39.012A STRAIN OF LUMBAR REGION, INITIAL ENCOUNTER: ICD-10-CM

## 2019-09-15 DIAGNOSIS — M47.812 CERVICAL ARTHRITIS: ICD-10-CM

## 2019-09-15 PROCEDURE — 72100 X-RAY EXAM L-S SPINE 2/3 VWS: CPT

## 2019-09-15 PROCEDURE — 74011250637 HC RX REV CODE- 250/637: Performed by: PHYSICIAN ASSISTANT

## 2019-09-15 PROCEDURE — 73562 X-RAY EXAM OF KNEE 3: CPT

## 2019-09-15 PROCEDURE — 99284 EMERGENCY DEPT VISIT MOD MDM: CPT

## 2019-09-15 PROCEDURE — 72050 X-RAY EXAM NECK SPINE 4/5VWS: CPT

## 2019-09-15 RX ORDER — METHOCARBAMOL 750 MG/1
750 TABLET, FILM COATED ORAL
Status: COMPLETED | OUTPATIENT
Start: 2019-09-15 | End: 2019-09-15

## 2019-09-15 RX ORDER — BUTALBITAL, ACETAMINOPHEN AND CAFFEINE 50; 325; 40 MG/1; MG/1; MG/1
1 TABLET ORAL
Status: COMPLETED | OUTPATIENT
Start: 2019-09-15 | End: 2019-09-15

## 2019-09-15 RX ORDER — NAPROXEN 500 MG/1
500 TABLET ORAL
Qty: 20 TAB | Refills: 0 | Status: SHIPPED | OUTPATIENT
Start: 2019-09-15 | End: 2020-07-17

## 2019-09-15 RX ORDER — BUTALBITAL, ACETAMINOPHEN AND CAFFEINE 300; 40; 50 MG/1; MG/1; MG/1
1 CAPSULE ORAL
Qty: 20 CAP | Refills: 0 | Status: SHIPPED | OUTPATIENT
Start: 2019-09-15 | End: 2020-07-17

## 2019-09-15 RX ORDER — NAPROXEN 250 MG/1
500 TABLET ORAL
Status: COMPLETED | OUTPATIENT
Start: 2019-09-15 | End: 2019-09-15

## 2019-09-15 RX ORDER — METHOCARBAMOL 750 MG/1
750 TABLET, FILM COATED ORAL 4 TIMES DAILY
Qty: 20 TAB | Refills: 0 | Status: SHIPPED | OUTPATIENT
Start: 2019-09-15 | End: 2020-07-17

## 2019-09-15 RX ADMIN — METHOCARBAMOL TABLETS 750 MG: 750 TABLET, COATED ORAL at 18:10

## 2019-09-15 RX ADMIN — NAPROXEN 500 MG: 250 TABLET ORAL at 18:10

## 2019-09-15 RX ADMIN — BUTALBITAL, ACETAMINOPHEN, AND CAFFEINE 1 TABLET: 50; 325; 40 TABLET ORAL at 18:10

## 2019-09-15 NOTE — ED NOTES
Carrie Rooney PA-C reviewed discharge instructions with the patient. The patient verbalized understanding. All questions and concerns were addressed. The patient declined a wheelchair and is discharged ambulatory in the care of family members with instructions and prescriptions in hand. Pt is alert and oriented x 4. Respirations are clear and unlabored.
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
## 2019-09-27 ENCOUNTER — HOSPITAL ENCOUNTER (EMERGENCY)
Age: 54
Discharge: HOME | End: 2019-09-27
Attending: EMERGENCY MEDICINE
Payer: COMMERCIAL

## 2019-09-27 ENCOUNTER — APPOINTMENT (OUTPATIENT)
Dept: GENERAL RADIOLOGY | Age: 54
End: 2019-09-27
Attending: EMERGENCY MEDICINE
Payer: COMMERCIAL

## 2019-09-27 VITALS
RESPIRATION RATE: 12 BRPM | OXYGEN SATURATION: 98 % | BODY MASS INDEX: 36.7 KG/M2 | HEIGHT: 72 IN | DIASTOLIC BLOOD PRESSURE: 64 MMHG | HEART RATE: 70 BPM | TEMPERATURE: 98.1 F | SYSTOLIC BLOOD PRESSURE: 117 MMHG | WEIGHT: 270.95 LBS

## 2019-09-27 DIAGNOSIS — R00.2 HEART PALPITATIONS: Primary | ICD-10-CM

## 2019-09-27 LAB
ALBUMIN SERPL-MCNC: 3.3 G/DL (ref 3.5–5)
ALBUMIN/GLOB SERPL: 0.7 {RATIO} (ref 1.1–2.2)
ALP SERPL-CCNC: 118 U/L (ref 45–117)
ALT SERPL-CCNC: 22 U/L (ref 12–78)
ANION GAP SERPL CALC-SCNC: 5 MMOL/L (ref 5–15)
APPEARANCE UR: CLEAR
AST SERPL-CCNC: 12 U/L (ref 15–37)
ATRIAL RATE: 86 BPM
BACTERIA URNS QL MICRO: NEGATIVE /HPF
BASOPHILS # BLD: 0.1 K/UL (ref 0–0.1)
BASOPHILS NFR BLD: 1 % (ref 0–1)
BILIRUB SERPL-MCNC: 0.2 MG/DL (ref 0.2–1)
BILIRUB UR QL: NEGATIVE
BUN SERPL-MCNC: 13 MG/DL (ref 6–20)
BUN/CREAT SERPL: 15 (ref 12–20)
CALCIUM SERPL-MCNC: 8.5 MG/DL (ref 8.5–10.1)
CALCULATED P AXIS, ECG09: 61 DEGREES
CALCULATED R AXIS, ECG10: -13 DEGREES
CALCULATED T AXIS, ECG11: 58 DEGREES
CHLORIDE SERPL-SCNC: 107 MMOL/L (ref 97–108)
CO2 SERPL-SCNC: 28 MMOL/L (ref 21–32)
COLOR UR: NORMAL
CREAT SERPL-MCNC: 0.85 MG/DL (ref 0.55–1.02)
DIAGNOSIS, 93000: NORMAL
DIFFERENTIAL METHOD BLD: ABNORMAL
EOSINOPHIL # BLD: 0.2 K/UL (ref 0–0.4)
EOSINOPHIL NFR BLD: 3 % (ref 0–7)
EPITH CASTS URNS QL MICRO: NORMAL /LPF
ERYTHROCYTE [DISTWIDTH] IN BLOOD BY AUTOMATED COUNT: 13.8 % (ref 11.5–14.5)
GLOBULIN SER CALC-MCNC: 4.6 G/DL (ref 2–4)
GLUCOSE SERPL-MCNC: 132 MG/DL (ref 65–100)
GLUCOSE UR STRIP.AUTO-MCNC: NEGATIVE MG/DL
HCT VFR BLD AUTO: 39.1 % (ref 35–47)
HGB BLD-MCNC: 12.7 G/DL (ref 11.5–16)
HGB UR QL STRIP: NEGATIVE
HYALINE CASTS URNS QL MICRO: NORMAL /LPF (ref 0–5)
IMM GRANULOCYTES # BLD AUTO: 0 K/UL (ref 0–0.04)
IMM GRANULOCYTES NFR BLD AUTO: 0 % (ref 0–0.5)
KETONES UR QL STRIP.AUTO: NEGATIVE MG/DL
LEUKOCYTE ESTERASE UR QL STRIP.AUTO: NEGATIVE
LYMPHOCYTES # BLD: 3.1 K/UL (ref 0.8–3.5)
LYMPHOCYTES NFR BLD: 50 % (ref 12–49)
MCH RBC QN AUTO: 27.2 PG (ref 26–34)
MCHC RBC AUTO-ENTMCNC: 32.5 G/DL (ref 30–36.5)
MCV RBC AUTO: 83.7 FL (ref 80–99)
MONOCYTES # BLD: 0.5 K/UL (ref 0–1)
MONOCYTES NFR BLD: 7 % (ref 5–13)
NEUTS SEG # BLD: 2.5 K/UL (ref 1.8–8)
NEUTS SEG NFR BLD: 39 % (ref 32–75)
NITRITE UR QL STRIP.AUTO: NEGATIVE
NRBC # BLD: 0 K/UL (ref 0–0.01)
NRBC BLD-RTO: 0 PER 100 WBC
P-R INTERVAL, ECG05: 174 MS
PH UR STRIP: 5.5 [PH] (ref 5–8)
PLATELET # BLD AUTO: 300 K/UL (ref 150–400)
PMV BLD AUTO: 9.5 FL (ref 8.9–12.9)
POTASSIUM SERPL-SCNC: 3.4 MMOL/L (ref 3.5–5.1)
PROT SERPL-MCNC: 7.9 G/DL (ref 6.4–8.2)
PROT UR STRIP-MCNC: NEGATIVE MG/DL
Q-T INTERVAL, ECG07: 382 MS
QRS DURATION, ECG06: 84 MS
QTC CALCULATION (BEZET), ECG08: 457 MS
RBC # BLD AUTO: 4.67 M/UL (ref 3.8–5.2)
RBC #/AREA URNS HPF: NORMAL /HPF (ref 0–5)
SODIUM SERPL-SCNC: 140 MMOL/L (ref 136–145)
SP GR UR REFRACTOMETRY: 1.02 (ref 1–1.03)
TROPONIN I SERPL-MCNC: <0.05 NG/ML
UA: UC IF INDICATED,UAUC: NORMAL
UROBILINOGEN UR QL STRIP.AUTO: 1 EU/DL (ref 0.2–1)
VENTRICULAR RATE, ECG03: 86 BPM
WBC # BLD AUTO: 6.4 K/UL (ref 3.6–11)
WBC URNS QL MICRO: NORMAL /HPF (ref 0–4)

## 2019-09-27 PROCEDURE — 80053 COMPREHEN METABOLIC PANEL: CPT

## 2019-09-27 PROCEDURE — 84484 ASSAY OF TROPONIN QUANT: CPT

## 2019-09-27 PROCEDURE — 36415 COLL VENOUS BLD VENIPUNCTURE: CPT

## 2019-09-27 PROCEDURE — 93005 ELECTROCARDIOGRAM TRACING: CPT

## 2019-09-27 PROCEDURE — 85025 COMPLETE CBC W/AUTO DIFF WBC: CPT

## 2019-09-27 PROCEDURE — 71046 X-RAY EXAM CHEST 2 VIEWS: CPT

## 2019-09-27 PROCEDURE — 99285 EMERGENCY DEPT VISIT HI MDM: CPT

## 2019-09-27 PROCEDURE — 81001 URINALYSIS AUTO W/SCOPE: CPT

## 2019-09-27 NOTE — ED PROVIDER NOTES
EMERGENCY DEPARTMENT HISTORY AND PHYSICAL EXAM      Date: 9/27/2019  Patient Name: Adalberto Pickard    History of Presenting Illness     Chief Complaint   Patient presents with    Palpitations     x3 days. States it woke her up out of her sleep today. Hx of anxiety       History Provided By: Patient    HPI: Adalberto Pickard, 53 y.o. female  presents to the ED with cc of palpitations. Patient states she has had heart palpitations for the past 3 days. Tonight at around 1 AM she states the palpitations woke her from her sleep. She denies any chest pain. She has no shortness of breath. She felt a little giddy but no dizziness or lightheadedness. No vomiting or diarrhea. Symptoms have currently resolved. She does have a history of anxiety. There are no other complaints, changes, or physical findings at this time. PCP: Mikey Goodman MD    No current facility-administered medications on file prior to encounter. Current Outpatient Medications on File Prior to Encounter   Medication Sig Dispense Refill    butalbital-acetaminophen-caff (FIORICET) -40 mg per capsule Take 1 Cap by mouth every six (6) hours as needed for Pain. 20 Cap 0    naproxen (NAPROSYN) 500 mg tablet Take 1 Tab by mouth every twelve (12) hours as needed for Pain. 20 Tab 0    methocarbamol (ROBAXIN-750) 750 mg tablet Take 1 Tab by mouth four (4) times daily. 20 Tab 0    pantoprazole (PROTONIX) 40 mg tablet Take 40 mg by mouth daily.  albuterol (PROVENTIL HFA, VENTOLIN HFA, PROAIR HFA) 90 mcg/actuation inhaler Take 2 Puffs by inhalation every four (4) hours as needed for Wheezing. 1 Inhaler 0    ALPRAZolam (XANAX) 1 mg tablet Take 1 mg by mouth three (3) times daily as needed for Anxiety.          Past History     Past Medical History:  Past Medical History:   Diagnosis Date    Acute bronchitis Jan 2015    Acute sinusitis     Anxiety     Arthritis     Back pain     Cocaine abuse with cocaine-induced disorder ([facility] [address]     Delivery normal     x4    Dizziness     Gastrointestinal disorder     \"stomach ulcer\"    Other ill-defined conditions(799.89)     anemia    Palpitations     Peptic ulcer disease     Sciatica     TIA (transient ischemic attack)     Upper respiratory infection        Past Surgical History:  Past Surgical History:   Procedure Laterality Date    ABDOMEN SURGERY PROC UNLISTED      ulcer    HX GYN      tubal ligation    HX HEENT      sinus surgery    HX TONSILLECTOMY      HX TONSILLECTOMY         Family History:  Family History   Problem Relation Age of Onset    Stroke Mother     Heart Disease Mother        Social History:  Social History     Tobacco Use    Smoking status: Former Smoker     Packs/day: 0.50     Years: 15.00     Pack years: 7.50     Last attempt to quit: 4/2/2016     Years since quitting: 3.4    Smokeless tobacco: Never Used   Substance Use Topics    Alcohol use: No    Drug use: No       Allergies: Allergies   Allergen Reactions    Penicillins Anaphylaxis     Has taken cephalexin without problem    Hydrocodone Itching         Review of Systems   Review of Systems   Constitutional: Negative for chills and fever. HENT: Negative for congestion, ear pain, rhinorrhea, sore throat and trouble swallowing. Eyes: Negative for visual disturbance. Respiratory: Negative for cough, chest tightness and shortness of breath. Cardiovascular: Positive for palpitations. Negative for chest pain. Gastrointestinal: Negative for abdominal pain, blood in stool, constipation, diarrhea, nausea and vomiting. Genitourinary: Negative for decreased urine volume, difficulty urinating, dysuria and frequency. Musculoskeletal: Negative for back pain and neck pain. Skin: Negative for color change and rash. Neurological: Negative for dizziness, weakness, light-headedness and headaches. Physical Exam   Physical Exam   Constitutional: She is oriented to person, place, and time.  She appears well-developed and well-nourished. She does not appear ill. No distress. HENT:   Mouth/Throat: Oropharynx is clear and moist.   Blue hair   Eyes: Conjunctivae are normal.   Neck: Neck supple. Cardiovascular: Normal rate and regular rhythm. Pulmonary/Chest: Effort normal and breath sounds normal. No accessory muscle usage. No respiratory distress. Abdominal: Soft. She exhibits no distension. There is no tenderness. Lymphadenopathy:     She has no cervical adenopathy. Neurological: She is alert and oriented to person, place, and time. She has normal strength. No cranial nerve deficit or sensory deficit. Skin: Skin is warm and dry. Nursing note and vitals reviewed. Diagnostic Study Results     Labs -     Recent Results (from the past 24 hour(s))   EKG, 12 LEAD, INITIAL    Collection Time: 09/27/19  1:56 AM   Result Value Ref Range    Ventricular Rate 86 BPM    Atrial Rate 86 BPM    P-R Interval 174 ms    QRS Duration 84 ms    Q-T Interval 382 ms    QTC Calculation (Bezet) 457 ms    Calculated P Axis 61 degrees    Calculated R Axis -13 degrees    Calculated T Axis 58 degrees    Diagnosis       Normal sinus rhythm  Cannot rule out Anterior infarct (cited on or before 27-SEP-2019)  When compared with ECG of 17-JUL-2019 23:46,  No significant change was found     CBC WITH AUTOMATED DIFF    Collection Time: 09/27/19  2:08 AM   Result Value Ref Range    WBC 6.4 3.6 - 11.0 K/uL    RBC 4.67 3.80 - 5.20 M/uL    HGB 12.7 11.5 - 16.0 g/dL    HCT 39.1 35.0 - 47.0 %    MCV 83.7 80.0 - 99.0 FL    MCH 27.2 26.0 - 34.0 PG    MCHC 32.5 30.0 - 36.5 g/dL    RDW 13.8 11.5 - 14.5 %    PLATELET 525 552 - 365 K/uL    MPV 9.5 8.9 - 12.9 FL    NRBC 0.0 0  WBC    ABSOLUTE NRBC 0.00 0.00 - 0.01 K/uL    NEUTROPHILS 39 32 - 75 %    LYMPHOCYTES 50 (H) 12 - 49 %    MONOCYTES 7 5 - 13 %    EOSINOPHILS 3 0 - 7 %    BASOPHILS 1 0 - 1 %    IMMATURE GRANULOCYTES 0 0.0 - 0.5 %    ABS. NEUTROPHILS 2.5 1.8 - 8.0 K/UL    ABS. LYMPHOCYTES 3.1 0.8 - 3.5 K/UL    ABS. MONOCYTES 0.5 0.0 - 1.0 K/UL    ABS. EOSINOPHILS 0.2 0.0 - 0.4 K/UL    ABS. BASOPHILS 0.1 0.0 - 0.1 K/UL    ABS. IMM. GRANS. 0.0 0.00 - 0.04 K/UL    DF AUTOMATED     METABOLIC PANEL, COMPREHENSIVE    Collection Time: 09/27/19  2:08 AM   Result Value Ref Range    Sodium 140 136 - 145 mmol/L    Potassium 3.4 (L) 3.5 - 5.1 mmol/L    Chloride 107 97 - 108 mmol/L    CO2 28 21 - 32 mmol/L    Anion gap 5 5 - 15 mmol/L    Glucose 132 (H) 65 - 100 mg/dL    BUN 13 6 - 20 MG/DL    Creatinine 0.85 0.55 - 1.02 MG/DL    BUN/Creatinine ratio 15 12 - 20      GFR est AA >60 >60 ml/min/1.73m2    GFR est non-AA >60 >60 ml/min/1.73m2    Calcium 8.5 8.5 - 10.1 MG/DL    Bilirubin, total 0.2 0.2 - 1.0 MG/DL    ALT (SGPT) 22 12 - 78 U/L    AST (SGOT) 12 (L) 15 - 37 U/L    Alk. phosphatase 118 (H) 45 - 117 U/L    Protein, total 7.9 6.4 - 8.2 g/dL    Albumin 3.3 (L) 3.5 - 5.0 g/dL    Globulin 4.6 (H) 2.0 - 4.0 g/dL    A-G Ratio 0.7 (L) 1.1 - 2.2     TROPONIN I    Collection Time: 09/27/19  2:08 AM   Result Value Ref Range    Troponin-I, Qt. <0.05 <0.05 ng/mL   URINALYSIS W/ REFLEX CULTURE    Collection Time: 09/27/19  2:08 AM   Result Value Ref Range    Color YELLOW/STRAW      Appearance CLEAR CLEAR      Specific gravity 1.016 1.003 - 1.030      pH (UA) 5.5 5.0 - 8.0      Protein NEGATIVE  NEG mg/dL    Glucose NEGATIVE  NEG mg/dL    Ketone NEGATIVE  NEG mg/dL    Bilirubin NEGATIVE  NEG      Blood NEGATIVE  NEG      Urobilinogen 1.0 0.2 - 1.0 EU/dL    Nitrites NEGATIVE  NEG      Leukocyte Esterase NEGATIVE  NEG      WBC 0-4 0 - 4 /hpf    RBC 0-5 0 - 5 /hpf    Epithelial cells FEW FEW /lpf    Bacteria NEGATIVE  NEG /hpf    UA:UC IF INDICATED CULTURE NOT INDICATED BY UA RESULT CNI      Hyaline cast 0-2 0 - 5 /lpf       Radiologic Studies -   XR CHEST PA LAT   Final Result   IMPRESSION: No acute process.         CT Results  (Last 48 hours)    None        CXR Results  (Last 48 hours)               09/27/19 0221  XR CHEST PA LAT Final result    Impression:  IMPRESSION: No acute process. Narrative:  INDICATION: Palpitations       COMPARISON: July 18, 2019       FINDINGS: PA and lateral views of the chest demonstrate a stable   cardiomediastinal silhouette and clear lungs bilaterally. The visualized osseous   structures are unremarkable. Medical Decision Making   I am the first provider for this patient. I reviewed the vital signs, available nursing notes, past medical history, past surgical history, family history and social history. Vital Signs-Reviewed the patient's vital signs. Patient Vitals for the past 24 hrs:   Temp Pulse Resp BP SpO2   09/27/19 0151 98.1 °F (36.7 °C) 88 15 (!) 136/91 98 %       EKG interpretation: (Preliminary)  Rhythm: normal sinus rhythm; and regular . Rate (approx.): 86; Axis: normal; SC interval: normal; QRS interval: normal ; ST/T wave: non-specific changes. Records Reviewed: Nursing Notes and Old Medical Records    Provider Notes (Medical Decision Making): This patient presents with heart palpitations that woke her from sleep. Symptoms resolved vital signs normal on arrival.  EKG is nonischemic and shows no dysrhythmias or ectopic beats. Electrolytes are all normal no metabolic abnormalities. Will recommend that she follow-up with cardiology for possible Holter monitor. No medication changes or additions to medications at this time. ED Course:   Initial assessment performed. The patients presenting problems have been discussed, and they are in agreement with the care plan formulated and outlined with them. I have encouraged them to ask questions as they arise throughout their visit.          Orders Placed This Encounter    XR CHEST PA LAT    CBC WITH AUTOMATED DIFF    METABOLIC PANEL, COMPREHENSIVE    TROPONIN I    URINALYSIS W/ REFLEX CULTURE    EKG 12 LEAD INITIAL    INSERT PERIPHERAL IV ONE TIME STAT         Critical Care Time: 0    Disposition:  Discharge  4:12 AM    The patient's emergency department evaluation is now complete. I have reviewed all labs, imaging, and pertinent information. I have discussed all results with the patient and/or family. Based on our evaluation today I do believe that the patient is safe to be discharged home. The patient has been provided with at home instructions that are pertinent to their complaint today, although these may not be specific to the exact diagnosis. I have reviewed the patient's home medications and attempted to reconcile if not already done so by pharmacy or nursing staff. I have discussed all new prescriptions with the patient. The patient has been encouraged to follow-up with primary care doctor and/or specialist, and these have been discussed with the patient. The patient has been advised that they may return to the emergency department if they have any worsening symptoms and or new symptoms that are of concern to them. Verbal discharge instructions may have also been provided to the patient that may not be specifically contained in the written discharge instructions. The patient has been given opportunity to ask questions prior to discharge. PLAN:  1. Current Discharge Medication List        2. Follow-up Information     Follow up With Specialties Details Why Contact Natalee Nava MD Cardiology Schedule an appointment as soon as possible for a visit in 1 week  [address] [facility]  [phone]          Return to ED if worse     Diagnosis     Clinical Impression:   1. Heart palpitations            This note will not be viewable in [employer].

## 2019-09-27 NOTE — DISCHARGE INSTRUCTIONS
Thank you for visiting our emergency department today. We all do hope that we were able to assist you in your emergent needs today. Please read over your discharge instructions as these contain pertinent information to help you in the healing process. These instructions include a list of prescriptions you were given today. Follow-up information is also noted on your discharge papers. There are attached instructions and information pertaining to the reason why you were seen in the emergency department today. These discharge instructions may not be for exactly why you were here, but may be the closest available instructions that we have. These include important advice for things that you can do at home to feel better, and reasons to return to the emergency department. The evaluation and treatment you received in the emergency department is not always definitive care. If follow-up with your primary care doctor or specialist was recommended, it is important that you make these appointments for follow-up care. You may need further testing, procedures, and/or medications to help you feel better. Further tests may be required that are not available in the emergency department. Failure to make these follow-up appointments may jeopardize your health. The emergency department is here for emergent stabilization and evaluation of life and limb threatening illness and/or injuries. Further care through a specialist or primary care doctor may be required to assist in your healing.

## 2020-07-17 ENCOUNTER — OFFICE VISIT (OUTPATIENT)
Dept: URGENT CARE | Age: 55
End: 2020-07-17

## 2020-07-17 VITALS — RESPIRATION RATE: 14 BRPM | TEMPERATURE: 98.5 F | HEART RATE: 77 BPM | OXYGEN SATURATION: 95 %

## 2020-07-17 DIAGNOSIS — R51.9 ACUTE NONINTRACTABLE HEADACHE, UNSPECIFIED HEADACHE TYPE: ICD-10-CM

## 2020-07-17 DIAGNOSIS — Z20.822 EXPOSURE TO COVID-19 VIRUS: Primary | ICD-10-CM

## 2020-07-17 NOTE — PROGRESS NOTES
This patient was seen in [facility] at [address] [place] [address] Urgent Care while in their vehicle due to COVID-19 pandemic with PPE and focused examination in order to decrease community viral transmission. The patient/guardian gave verbal consent to treat. Deedee Lazar is a 63 y.o. female who presents with slight HA for a few days. Was exposed to COVID-19 by grandson. Denies cough, fever, SOB. PMH: anemia. Former smoker. The history is provided by the patient.         Past Medical History:   Diagnosis Date    Acute bronchitis 2015    Acute sinusitis     Anxiety     Arthritis     Back pain     Cocaine abuse with cocaine-induced disorder (HCC)     Delivery normal     x4    Dizziness     Gastrointestinal disorder     \"stomach ulcer\"    Other ill-defined conditions(799.89)     anemia    Palpitations     Peptic ulcer disease     Sciatica     TIA (transient ischemic attack)     Upper respiratory infection         Past Surgical History:   Procedure Laterality Date    ABDOMEN SURGERY PROC UNLISTED      ulcer    HX GYN      tubal ligation    HX HEENT      sinus surgery    HX TONSILLECTOMY      HX TONSILLECTOMY           Family History   Problem Relation Age of Onset    Stroke Mother     Heart Disease Mother         Social History     Socioeconomic History    Marital status: SINGLE     Spouse name: Not on file    Number of children: Not on file    Years of education: Not on file    Highest education level: Not on file   Occupational History    Not on file   Social Needs    Financial resource strain: Not on file    Food insecurity     Worry: Not on file     Inability: Not on file    Transportation needs     Medical: Not on file     Non-medical: Not on file   Tobacco Use    Smoking status: Former Smoker     Packs/day: 0.50     Years: 15.00     Pack years: 7.50     Last attempt to quit: 2016     Years since quittin.2    Smokeless tobacco: Never Used   Substance and Sexual Activity    Alcohol use: No    Drug use: No    Sexual activity: Never     Birth control/protection: Surgical   Lifestyle    Physical activity     Days per week: Not on file     Minutes per session: Not on file    Stress: Not on file   Relationships    Social connections     Talks on phone: Not on file     Gets together: Not on file     Attends Druze service: Not on file     Active member of club or organization: Not on file     Attends meetings of clubs or organizations: Not on file     Relationship status: Not on file    Intimate partner violence     Fear of current or ex partner: Not on file     Emotionally abused: Not on file     Physically abused: Not on file     Forced sexual activity: Not on file   Other Topics Concern    Not on file   Social History Narrative    Not on file                ALLERGIES: Penicillins and Hydrocodone    Review of Systems   Constitutional: Negative for activity change, appetite change, chills and fever. HENT: Negative for congestion, rhinorrhea and sore throat. Respiratory: Negative for cough, shortness of breath and wheezing. Cardiovascular: Negative for chest pain. Gastrointestinal: Negative for abdominal pain, diarrhea, nausea and vomiting. Musculoskeletal: Negative for myalgias. Neurological: Positive for headaches. Vitals:    07/17/20 1110   Pulse: 77   Resp: 14   Temp: 98.5 °F (36.9 °C)   SpO2: 95%       Physical Exam  Vitals signs and nursing note reviewed. Constitutional:       General: She is not in acute distress. Appearance: She is well-developed. She is not diaphoretic. Pulmonary:      Effort: Pulmonary effort is normal. No respiratory distress. Breath sounds: Normal breath sounds. No stridor. No wheezing, rhonchi or rales. Neurological:      Mental Status: She is alert. Psychiatric:         Behavior: Behavior normal.         Thought Content:  Thought content normal.         Judgment: Judgment normal.         MDM ICD-10-CM ICD-9-CM   1. Exposure to COVID-19 virus  Z20.828 V01.79   2. Acute nonintractable headache, unspecified headache type  R51 784.0       Orders Placed This Encounter    NOVEL CORONAVIRUS (COVID-19)     Scheduling Instructions:      1) Due to current limited availability of the COVID-19 PCR test, tests will be prioritized and may not be completed.              2) Order only if the test result will change clinical management or necessary for a return to mission-critical employment decision.              3) Print and instruct patient to adhere to [facility] home isolation program. (Link Above)              4) Set up or refer patient for a monitoring program.              5) Have patient sign up for and leverage [employer] (if not previously done). Order Specific Question:   Status     Answer:   Symptomatic/Infection Suspected      Self Quarantine  Deep breathing exercises  Tylenol prn  Increase fluids    If signs and symptoms become worse the pt is to go to the ER.          Procedures

## 2020-07-22 LAB — SARS-COV-2, NAA: NOT DETECTED

## 2020-08-11 ENCOUNTER — OFFICE VISIT (OUTPATIENT)
Dept: URGENT CARE | Age: 55
End: 2020-08-11
Payer: COMMERCIAL

## 2020-08-11 VITALS — HEART RATE: 76 BPM | RESPIRATION RATE: 16 BRPM | OXYGEN SATURATION: 97 % | TEMPERATURE: 98.4 F

## 2020-08-11 DIAGNOSIS — Z20.822 EXPOSURE TO COVID-19 VIRUS: Primary | ICD-10-CM

## 2020-08-11 PROCEDURE — 99212 OFFICE O/P EST SF 10 MIN: CPT | Performed by: NURSE PRACTITIONER

## 2020-08-11 NOTE — PROGRESS NOTES
Subjective: (As above and below)       This patient was seen in [facility] Clinic at [address] [place] [address] Urgent Care while in their vehicle due to COVID-19 pandemic with PPE and focused examination in order to decrease community viral transmission. The patient/guardian gave verbal consent to treat. Chief Complaint   Patient presents with    Covid Testing     No sx, positive COVID exposure     Shirley Perkins is a 63 y.o. female who presents for COVID-19 Exposure and testing. Currently has not tried any therapies and denies any symptoms at this point in time. Feels well otherwise. Recent travel: no  Known Exposure to COVID-19: YES  Known flu or strep contact: no         ROS- negative for dizziness, cough, SOB, rhinorrhea, myalgias, n/v/d, rashes, headaches, fevers, chills, chest pains. Review of Systems - negative except as listed above    Reviewed PmHx, RxHx, FmHx, SocHx, AllgHx and updated in chart.   Family History   Problem Relation Age of Onset    Stroke Mother     Heart Disease Mother        Past Medical History:   Diagnosis Date    Acute bronchitis 2015    Acute sinusitis     Anxiety     Arthritis     Back pain     Cocaine abuse with cocaine-induced disorder ([facility] [address]     Delivery normal     x4    Dizziness     Gastrointestinal disorder     \"stomach ulcer\"    Other ill-defined conditions(799.89)     anemia    Palpitations     Peptic ulcer disease     Sciatica     TIA (transient ischemic attack)     Upper respiratory infection       Social History     Socioeconomic History    Marital status: SINGLE     Spouse name: Not on file    Number of children: Not on file    Years of education: Not on file    Highest education level: Not on file   Tobacco Use    Smoking status: Former Smoker     Packs/day: 0.50     Years: 15.00     Pack years: 7.50     Last attempt to quit: 2016     Years since quittin.3    Smokeless tobacco: Never Used   Substance and Sexual Activity    Alcohol use: No    Drug use: No    Sexual activity: Never     Birth control/protection: Surgical          Current Outpatient Medications   Medication Sig    pantoprazole (PROTONIX) 40 mg tablet Take 40 mg by mouth daily.  albuterol (PROVENTIL HFA, VENTOLIN HFA, PROAIR HFA) 90 mcg/actuation inhaler Take 2 Puffs by inhalation every four (4) hours as needed for Wheezing.  ALPRAZolam (XANAX) 1 mg tablet Take 1 mg by mouth three (3) times daily as needed for Anxiety. No current facility-administered medications for this visit. Objective:     Vitals:    08/11/20 1323   Pulse: 76   Resp: 16   Temp: 98.4 °F (36.9 °C)   SpO2: 97%       Physical Exam  General appearance  appears well hydrated and does not appear toxic, no acute distress  Eyes - EOMs intact. Non injected. No scleral icterus   Ears - no external swelling  Nose - No purulent drainage  Neck/Lymphatics  trachea midline, full AROM  Chest - normal breathing effort. No active cough heard. No audible wheezing. Heart - HR-see vitals  Skin - no observable rashes or pallor  Neurologic- alert and oriented x 3  Psychiatric- normal mood, behavior and though content. Assessment/ Plan:     1. Exposure to COVID-19 virus    - NOVEL CORONAVIRUS (COVID-19)      Will test for COVID-19 given exposure  Supportive home care reviewed for any development of mild symptoms - tylenol, maintain adequate fluid intake, deep breathing exercises  Self isolate/quarantine advised based on recommendations in current CDC guidelines. Follow up: We have reviewed, in detail, particular presentations/worsening/concerning signs and symptoms that may warrant seeking immediate care in the ED setting and patient verbalized being aware of what to watch for. For other non-severe changes, non-improvement or questions, patient aware to contact PCP office or consider a virtual online medical consultation.       Reviewed with her that COVID-19 pandemic is an evolving situation with rapidly changing recommendations & guidelines. Medical decisions are made based on the the best information available at the time.   Recommended she stay tuned for updates published by trusted sources and to advise your PCP of any unexpected changes in clinical condition     Stephen Avalos NP

## 2020-08-13 LAB — SARS-COV-2, NAA: NOT DETECTED

## 2020-09-07 ENCOUNTER — HOSPITAL ENCOUNTER (EMERGENCY)
Age: 55
Discharge: HOME | End: 2020-09-07
Attending: EMERGENCY MEDICINE
Payer: COMMERCIAL

## 2020-09-07 ENCOUNTER — APPOINTMENT (OUTPATIENT)
Dept: GENERAL RADIOLOGY | Age: 55
End: 2020-09-07
Attending: EMERGENCY MEDICINE
Payer: COMMERCIAL

## 2020-09-07 VITALS
DIASTOLIC BLOOD PRESSURE: 77 MMHG | WEIGHT: 272.27 LBS | RESPIRATION RATE: 20 BRPM | HEART RATE: 90 BPM | TEMPERATURE: 103 F | HEIGHT: 72 IN | SYSTOLIC BLOOD PRESSURE: 130 MMHG | BODY MASS INDEX: 36.88 KG/M2 | OXYGEN SATURATION: 99 %

## 2020-09-07 DIAGNOSIS — R50.9 FEVER, UNSPECIFIED FEVER CAUSE: Primary | ICD-10-CM

## 2020-09-07 DIAGNOSIS — U07.1 COVID-19: ICD-10-CM

## 2020-09-07 LAB
ALBUMIN SERPL-MCNC: 3.6 G/DL (ref 3.5–5)
ALBUMIN/GLOB SERPL: 0.7 {RATIO} (ref 1.1–2.2)
ALP SERPL-CCNC: 105 U/L (ref 45–117)
ALT SERPL-CCNC: 37 U/L (ref 12–78)
ANION GAP SERPL CALC-SCNC: 2 MMOL/L (ref 5–15)
APPEARANCE UR: CLEAR
AST SERPL-CCNC: 28 U/L (ref 15–37)
BASOPHILS # BLD: 0 K/UL (ref 0–0.1)
BASOPHILS NFR BLD: 0 % (ref 0–1)
BILIRUB SERPL-MCNC: 0.4 MG/DL (ref 0.2–1)
BILIRUB UR QL: NEGATIVE
BUN SERPL-MCNC: 9 MG/DL (ref 6–20)
BUN/CREAT SERPL: 9 (ref 12–20)
CALCIUM SERPL-MCNC: 9.3 MG/DL (ref 8.5–10.1)
CHLORIDE SERPL-SCNC: 104 MMOL/L (ref 97–108)
CO2 SERPL-SCNC: 32 MMOL/L (ref 21–32)
COLOR UR: ABNORMAL
COMMENT, HOLDF: NORMAL
CREAT SERPL-MCNC: 1.03 MG/DL (ref 0.55–1.02)
DIFFERENTIAL METHOD BLD: ABNORMAL
EOSINOPHIL # BLD: 0 K/UL (ref 0–0.4)
EOSINOPHIL NFR BLD: 0 % (ref 0–7)
ERYTHROCYTE [DISTWIDTH] IN BLOOD BY AUTOMATED COUNT: 14.7 % (ref 11.5–14.5)
GLOBULIN SER CALC-MCNC: 5.5 G/DL (ref 2–4)
GLUCOSE SERPL-MCNC: 119 MG/DL (ref 65–100)
GLUCOSE UR STRIP.AUTO-MCNC: NEGATIVE MG/DL
HCT VFR BLD AUTO: 39.3 % (ref 35–47)
HGB BLD-MCNC: 12.9 G/DL (ref 11.5–16)
HGB UR QL STRIP: NEGATIVE
IMM GRANULOCYTES # BLD AUTO: 0 K/UL (ref 0–0.04)
IMM GRANULOCYTES NFR BLD AUTO: 0 % (ref 0–0.5)
KETONES UR QL STRIP.AUTO: ABNORMAL MG/DL
LACTATE SERPL-SCNC: 1.5 MMOL/L (ref 0.4–2)
LEUKOCYTE ESTERASE UR QL STRIP.AUTO: NEGATIVE
LYMPHOCYTES # BLD: 1.1 K/UL (ref 0.8–3.5)
LYMPHOCYTES NFR BLD: 9 % (ref 12–49)
MCH RBC QN AUTO: 27.3 PG (ref 26–34)
MCHC RBC AUTO-ENTMCNC: 32.8 G/DL (ref 30–36.5)
MCV RBC AUTO: 83.1 FL (ref 80–99)
MONOCYTES # BLD: 0.5 K/UL (ref 0–1)
MONOCYTES NFR BLD: 4 % (ref 5–13)
NEUTS BAND NFR BLD MANUAL: 2 %
NEUTS SEG # BLD: 10.2 K/UL (ref 1.8–8)
NEUTS SEG NFR BLD: 85 % (ref 32–75)
NITRITE UR QL STRIP.AUTO: NEGATIVE
NRBC # BLD: 0 K/UL (ref 0–0.01)
NRBC BLD-RTO: 0 PER 100 WBC
PH UR STRIP: 7.5 [PH] (ref 5–8)
PLATELET # BLD AUTO: 284 K/UL (ref 150–400)
PMV BLD AUTO: 9.5 FL (ref 8.9–12.9)
POTASSIUM SERPL-SCNC: 3.4 MMOL/L (ref 3.5–5.1)
PROT SERPL-MCNC: 9.1 G/DL (ref 6.4–8.2)
PROT UR STRIP-MCNC: NEGATIVE MG/DL
RBC # BLD AUTO: 4.73 M/UL (ref 3.8–5.2)
RBC MORPH BLD: ABNORMAL
SAMPLES BEING HELD,HOLD: NORMAL
SODIUM SERPL-SCNC: 138 MMOL/L (ref 136–145)
SP GR UR REFRACTOMETRY: 1.01 (ref 1–1.03)
UROBILINOGEN UR QL STRIP.AUTO: 1 EU/DL (ref 0.2–1)
WBC # BLD AUTO: 11.8 K/UL (ref 3.6–11)

## 2020-09-07 PROCEDURE — 71045 X-RAY EXAM CHEST 1 VIEW: CPT

## 2020-09-07 PROCEDURE — 87040 BLOOD CULTURE FOR BACTERIA: CPT

## 2020-09-07 PROCEDURE — 99285 EMERGENCY DEPT VISIT HI MDM: CPT

## 2020-09-07 PROCEDURE — 80053 COMPREHEN METABOLIC PANEL: CPT

## 2020-09-07 PROCEDURE — 85025 COMPLETE CBC W/AUTO DIFF WBC: CPT

## 2020-09-07 PROCEDURE — 93005 ELECTROCARDIOGRAM TRACING: CPT

## 2020-09-07 PROCEDURE — 81003 URINALYSIS AUTO W/O SCOPE: CPT

## 2020-09-07 PROCEDURE — 83605 ASSAY OF LACTIC ACID: CPT

## 2020-09-07 PROCEDURE — 74011250637 HC RX REV CODE- 250/637: Performed by: EMERGENCY MEDICINE

## 2020-09-07 PROCEDURE — 87635 SARS-COV-2 COVID-19 AMP PRB: CPT

## 2020-09-07 PROCEDURE — 74011250636 HC RX REV CODE- 250/636: Performed by: EMERGENCY MEDICINE

## 2020-09-07 PROCEDURE — 36415 COLL VENOUS BLD VENIPUNCTURE: CPT

## 2020-09-07 RX ORDER — IBUPROFEN 600 MG/1
600 TABLET ORAL
Status: COMPLETED | OUTPATIENT
Start: 2020-09-07 | End: 2020-09-07

## 2020-09-07 RX ADMIN — SODIUM CHLORIDE 1000 ML: 900 INJECTION, SOLUTION INTRAVENOUS at 20:01

## 2020-09-07 RX ADMIN — IBUPROFEN 600 MG: 600 TABLET, FILM COATED ORAL at 19:56

## 2020-09-07 NOTE — ED NOTES
Pt report went to [facility] for her brothers   and got back to [place] today. Pt stated having a \"drunk-like\" feeling and a high temp of 101.2 starting yesterday evening. Pt reports taken Nyquil earlier today and woke up feeling worse. Pt states having a high fever. Body aches, headache  and sore throat.  Pt denies Chest pain, SOB, N/V.

## 2020-09-07 NOTE — ED NOTES
7:35 PM: Bedside shift change report given to Radha RN (oncoming nurse) by Ron Acosta (offgoing nurse). Report included the following information SBAR, Kardex, ED Summary, [facility] and Recent Results.      7:35 PM: pt returned from restroom

## 2020-09-07 NOTE — ED PROVIDER NOTES
EMERGENCY DEPARTMENT HISTORY AND PHYSICAL EXAM      Date: 2020  Patient Name: Peggy Jimenez    History of Presenting Illness     Chief Complaint   Patient presents with    Fever     103.0 in triage. Pt reports fever and chills with aches x 1 day. Pt reports recent travel. Pt took OTC at 1600. HPI: Peggy Jimenez, 63 y.o. female with history of hypertension presenting to ED with chief complaint of generalized weakness, myalgias, headache, fever. She was recently in [place] for a  and states she was wearing her mask the whole time. Today she started feeling bad. She denies any cough or shortness of breath she denies urinary symptoms. She denies abdominal pain. She denies nausea vomiting or diarrhea. No known sick contacts or exposuires. There are no other complaints, changes, or physical findings at this time. PCP: Shelley Muir MD    No current facility-administered medications on file prior to encounter. Current Outpatient Medications on File Prior to Encounter   Medication Sig Dispense Refill    pantoprazole (PROTONIX) 40 mg tablet Take 40 mg by mouth daily.  ALPRAZolam (XANAX) 1 mg tablet Take 1 mg by mouth three (3) times daily as needed for Anxiety.  albuterol (PROVENTIL HFA, VENTOLIN HFA, PROAIR HFA) 90 mcg/actuation inhaler Take 2 Puffs by inhalation every four (4) hours as needed for Wheezing.  1 Inhaler 0       Past History     Past Medical History:  Past Medical History:   Diagnosis Date    Acute bronchitis 2015    Acute sinusitis     Anxiety     Arthritis     Back pain     Cocaine abuse with cocaine-induced disorder (HCC)     Delivery normal     x4    Dizziness     Gastrointestinal disorder     \"stomach ulcer\"    Other ill-defined conditions(949.89)     anemia    Palpitations     Peptic ulcer disease     Sciatica     TIA (transient ischemic attack)     Upper respiratory infection        Past Surgical History:  Past Surgical History:   Procedure Laterality Date    ABDOMEN SURGERY PROC UNLISTED      ulcer    HX GYN      tubal ligation    HX HEENT      sinus surgery    HX TONSILLECTOMY      HX TONSILLECTOMY         Family History:  Family History   Problem Relation Age of Onset    Stroke Mother     Heart Disease Mother        Social History:  Social History     Tobacco Use    Smoking status: Former Smoker     Packs/day: 0.50     Years: 15.00     Pack years: 7.50     Last attempt to quit: 2016     Years since quittin.4    Smokeless tobacco: Never Used   Substance Use Topics    Alcohol use: No    Drug use: No       Allergies: Allergies   Allergen Reactions    Penicillins Anaphylaxis     Has taken cephalexin without problem    Hydrocodone Itching         Review of Systems   Review of Systems   Constitutional: Positive for fatigue and fever. Negative for chills. HENT: Negative for rhinorrhea. Eyes: Negative for redness. Respiratory: Negative for shortness of breath. Cardiovascular: Negative for chest pain. Gastrointestinal: Negative for abdominal pain. Genitourinary: Negative for dysuria. Musculoskeletal: Positive for myalgias. Negative for back pain. Neurological: Positive for headaches. Negative for syncope. Psychiatric/Behavioral: The patient is not nervous/anxious. All other systems reviewed and are negative. Physical Exam   Physical Exam  Vitals signs and nursing note reviewed. Constitutional:       Appearance: Normal appearance. HENT:      Head: Normocephalic and atraumatic. Mouth/Throat:      Mouth: Mucous membranes are moist.   Eyes:      Extraocular Movements: Extraocular movements intact. Neck:      Musculoskeletal: Neck supple. Cardiovascular:      Rate and Rhythm: Regular rhythm. Tachycardia present. Pulses: Normal pulses. Pulmonary:      Effort: Pulmonary effort is normal.      Breath sounds: Normal breath sounds. Abdominal:      Palpations: Abdomen is soft. Tenderness: There is no abdominal tenderness. Musculoskeletal:         General: No deformity. Skin:     General: Skin is warm and dry. Neurological:      General: No focal deficit present. Mental Status: She is alert. Psychiatric:         Mood and Affect: Mood normal.         Behavior: Behavior normal.         Diagnostic Study Results     Labs -     Recent Results (from the past 24 hour(s))   CBC WITH AUTOMATED DIFF    Collection Time: 09/07/20  6:40 PM   Result Value Ref Range    WBC 11.8 (H) 3.6 - 11.0 K/uL    RBC 4.73 3.80 - 5.20 M/uL    HGB 12.9 11.5 - 16.0 g/dL    HCT 39.3 35.0 - 47.0 %    MCV 83.1 80.0 - 99.0 FL    MCH 27.3 26.0 - 34.0 PG    MCHC 32.8 30.0 - 36.5 g/dL    RDW 14.7 (H) 11.5 - 14.5 %    PLATELET 914 333 - 703 K/uL    MPV 9.5 8.9 - 12.9 FL    NRBC 0.0 0  WBC    ABSOLUTE NRBC 0.00 0.00 - 0.01 K/uL    NEUTROPHILS 85 (H) 32 - 75 %    BAND NEUTROPHILS 2 %    LYMPHOCYTES 9 (L) 12 - 49 %    MONOCYTES 4 (L) 5 - 13 %    EOSINOPHILS 0 0 - 7 %    BASOPHILS 0 0 - 1 %    IMMATURE GRANULOCYTES 0 0.0 - 0.5 %    ABS. NEUTROPHILS 10.2 (H) 1.8 - 8.0 K/UL    ABS. LYMPHOCYTES 1.1 0.8 - 3.5 K/UL    ABS. MONOCYTES 0.5 0.0 - 1.0 K/UL    ABS. EOSINOPHILS 0.0 0.0 - 0.4 K/UL    ABS. BASOPHILS 0.0 0.0 - 0.1 K/UL    ABS. IMM.  GRANS. 0.0 0.00 - 0.04 K/UL    DF AUTOMATED      RBC COMMENTS NORMOCYTIC, NORMOCHROMIC     METABOLIC PANEL, COMPREHENSIVE    Collection Time: 09/07/20  6:40 PM   Result Value Ref Range    Sodium 138 136 - 145 mmol/L    Potassium 3.4 (L) 3.5 - 5.1 mmol/L    Chloride 104 97 - 108 mmol/L    CO2 32 21 - 32 mmol/L    Anion gap 2 (L) 5 - 15 mmol/L    Glucose 119 (H) 65 - 100 mg/dL    BUN 9 6 - 20 MG/DL    Creatinine 1.03 (H) 0.55 - 1.02 MG/DL    BUN/Creatinine ratio 9 (L) 12 - 20      GFR est AA >60 >60 ml/min/1.73m2    GFR est non-AA 55 (L) >60 ml/min/1.73m2    Calcium 9.3 8.5 - 10.1 MG/DL    Bilirubin, total 0.4 0.2 - 1.0 MG/DL    ALT (SGPT) 37 12 - 78 U/L    AST (SGOT) 28 15 - 37 U/L    Alk. phosphatase 105 45 - 117 U/L    Protein, total 9.1 (H) 6.4 - 8.2 g/dL    Albumin 3.6 3.5 - 5.0 g/dL    Globulin 5.5 (H) 2.0 - 4.0 g/dL    A-G Ratio 0.7 (L) 1.1 - 2.2     LACTIC ACID    Collection Time: 09/07/20  6:40 PM   Result Value Ref Range    Lactic acid 1.5 0.4 - 2.0 MMOL/L   URINALYSIS W/ RFLX MICROSCOPIC    Collection Time: 09/07/20  7:55 PM   Result Value Ref Range    Color YELLOW/STRAW      Appearance CLEAR CLEAR      Specific gravity 1.013 1.003 - 1.030      pH (UA) 7.5 5.0 - 8.0      Protein Negative NEG mg/dL    Glucose Negative NEG mg/dL    Ketone TRACE (A) NEG mg/dL    Bilirubin Negative NEG      Blood Negative NEG      Urobilinogen 1.0 0.2 - 1.0 EU/dL    Nitrites Negative NEG      Leukocyte Esterase Negative NEG     SAMPLES BEING HELD    Collection Time: 09/07/20  7:55 PM   Result Value Ref Range    SAMPLES BEING HELD UC     COMMENT        Add-on orders for these samples will be processed based on acceptable specimen integrity and analyte stability, which may vary by analyte. EKG, 12 LEAD, INITIAL    Collection Time: 09/07/20  8:01 PM   Result Value Ref Range    Ventricular Rate 105 BPM    Atrial Rate 105 BPM    P-R Interval 170 ms    QRS Duration 84 ms    Q-T Interval 310 ms    QTC Calculation (Bezet) 409 ms    Calculated P Axis 51 degrees    Calculated R Axis -16 degrees    Calculated T Axis 29 degrees    Diagnosis       Sinus tachycardia  When compared with ECG of 27-SEP-2019 01:56,  QT has shortened     SARS-COV-2    Collection Time: 09/07/20  8:47 PM   Result Value Ref Range    Specimen source Nasopharyngeal      SARS-CoV-2 PENDING     SARS-CoV-2 PENDING     Specimen source Nasopharyngeal      COVID-19 rapid test PENDING     Specimen type NP Swab      Health status PENDING     COVID-19 PENDING        Radiologic Studies -   XR CHEST PORT   Final Result   IMPRESSION:      No acute process on portable chest. No change.            CT Results  (Last 48 hours)    None        CXR Results (Last 48 hours)               09/07/20 1943  XR CHEST PORT Final result    Impression:  IMPRESSION:       No acute process on portable chest. No change. Narrative:  EXAM: XR CHEST PORT       INDICATION: Generalized weakness, myalgias, fever, and headache. COMPARISON: Chest views on 9/27/2019 and 7/18/2019. TECHNIQUE: Upright portable chest AP view       FINDINGS: The cardiomediastinal and hilar contours are within normal limits. The   pulmonary vasculature is within normal limits. The lungs and pleural spaces are clear. The visualized bones and upper abdomen   are age-appropriate. Medical Decision Making   I am the first provider for this patient. I reviewed the vital signs, available nursing notes, past medical history, past surgical history, family history and social history. Vital Signs-Reviewed the patient's vital signs. Patient Vitals for the past 24 hrs:   Temp Pulse Resp BP SpO2   09/07/20 2030    130/77 99 %   09/07/20 2007 (!) 103 °F (39.4 °C) 90 20 (!) 145/92 98 %   09/07/20 1930    (!) 114/92 100 %   09/07/20 1907     100 %   09/07/20 1834 (!) 103 °F (39.4 °C) (!) 128 16 156/89 97 %         Provider Notes (Medical Decision Making):   Very pleasant 79-year-old presenting to ED with chief complaint of symptoms suggestive of viral illness. Very patient for COVID-19 due to her recent travel and exposure to other people. She is febrile and tachycardic initially in ED but tachycardia resolved with waiting and ibuprofen. Chest x-ray is normal with no pneumonia or pneumothorax. Analysis negative no infection. Her white blood cells are normal.  Electrolytes are normal only slightly elevated creatinine that may be chronic. Ultimately, patient nontoxic in appearance and I think safe for discharge. Will recommend antipyretics and close follow-up with primary care physician. COVID-19 test sent. Return precautions given.     EKG sinus, rate 105 no VELMA, no STD, normal QT    ED Course:     Initial assessment performed. The patients presenting problems have been discussed, and they are in agreement with the care plan formulated and outlined with them. I have encouraged them to ask questions as they arise throughout their visit. Disposition:  dc    PLAN:  1. Discharge Medication List as of 9/7/2020  8:31 PM        2.    Follow-up Information    None       Return to ED if worse     Diagnosis     Clinical Impression: fever, possible COVID-19

## 2020-09-08 ENCOUNTER — PATIENT OUTREACH (OUTPATIENT)
Dept: CASE MANAGEMENT | Age: 55
End: 2020-09-08

## 2020-09-08 DIAGNOSIS — Z71.89 ADVANCED DIRECTIVES, COUNSELING/DISCUSSION: Primary | ICD-10-CM

## 2020-09-08 NOTE — ED NOTES
2045: Dr. Kaycee Escobedo has reviewed discharge instructions with the patient. The patient verbalized understanding without further questions. Education provided regarding COVID-19 testing and isolation. 2120: Patient ambulatory out of ED at this time to ride home.

## 2020-09-08 NOTE — PROGRESS NOTES
Patient contacted regarding COVID-19  risk. Discussed COVID-19 related testing which was pending at this time. Test results were pending. Patient informed of results, if available? Result Pending. Care Transition Nurse/ Ambulatory Care Manager/ LPN Care Coordinator contacted the patient by telephone to perform post discharge assessment. Verified name and  with patient as identifiers. Provided introduction to self, and explanation of the CTN/ACM/LPN role, and reason for call due to risk factors for infection and/or exposure to COVID-19. Symptoms reviewed with patient who verbalized the following symptoms: dizziness/lightheadedness, no new symptoms and no worsening symptoms. Due to no new or worsening symptoms encounter was not routed to provider for escalation. Discussed follow-up appointments. If no appointment was previously scheduled, appointment scheduling offered: N/A; followed by nonBS PCP.  [address] follow up appointment(s): No future appointments. [facility] follow up appointment(s):  Pt was advised to f/u with PCP (Dr. Doreen Collins Provider) post-discharge. ACM reminded patient today of recommended outpatient follow-up. Advance Care Planning:   Does patient have an Advance Directive: currently not on file; education provided . Patient is interested in additional information and completing an Advance Medical Directive. Referral to ACP Clinical Specialist ordered today. Patient has following risk factors of: no known risk factors. CTN/ACM/LPN reviewed discharge instructions, medical action plan and red flags such as increased shortness of breath, increasing fever and signs of decompensation with patient who verbalized understanding. Discussed exposure protocols and quarantine with CDC Guidelines What to do if you are sick with coronavirus disease .  Patient was given an opportunity for questions and concerns.  The patient agrees to contact the [facility] exposure line [phone], [facility] [address]  ([phone]) and PCP office for questions related to their healthcare. CTN/ACM provided contact information for future needs. Reviewed and educated patient on any new and changed medications related to discharge diagnosis; Rx - none. Patient/family/caregiver given information for [employer] and agrees to enroll yes  Patient's preferred e-mail:  [email or web address]  Patient's preferred phone number: [phone]  Based on Loop alert triggers, patient will be contacted by nurse care manager for worsening symptoms. Pt will be further monitored by COVID Loop Team based on severity of symptoms and risk factors.

## 2020-09-08 NOTE — ACP (ADVANCE CARE PLANNING)
Does patient have an Advance Directive: currently not on file; education provided . Patient is interested in additional information and completing an Advance Medical Directive. Referral to ACP Clinical Specialist ordered today.

## 2020-09-09 ENCOUNTER — PATIENT OUTREACH (OUTPATIENT)
Dept: CASE MANAGEMENT | Age: 55
End: 2020-09-09

## 2020-09-09 LAB
ATRIAL RATE: 105 BPM
CALCULATED P AXIS, ECG09: 51 DEGREES
CALCULATED R AXIS, ECG10: -16 DEGREES
CALCULATED T AXIS, ECG11: 29 DEGREES
COVID-19, XGCOVT: NOT DETECTED
DIAGNOSIS, 93000: NORMAL
HEALTH STATUS, XMCV2T: NORMAL
P-R INTERVAL, ECG05: 170 MS
Q-T INTERVAL, ECG07: 310 MS
QRS DURATION, ECG06: 84 MS
QTC CALCULATION (BEZET), ECG08: 409 MS
SOURCE, COVRS: NORMAL
SPECIMEN SOURCE, FCOV2M: NORMAL
SPECIMEN TYPE, XMCV1T: NORMAL
VENTRICULAR RATE, ECG03: 105 BPM

## 2020-09-09 NOTE — PROGRESS NOTES
The patient was called for notification of a NEGATIVE test result for COVID-19. The following information was given to the patient's daughter who is listed as an emergency contact on the demographics sheet:    The COVID-19 test, also known as novel coronavirus, result was negative  You probably were not infected at the time your sample was collected. However, that does not mean you will not get sick. The test result only means that you did not have COVID-19 at the time of testing. If you have no symptoms, continue to use preventive measures to protect yourself and others. If you have been sick, there are many other potential infectious causes. Contact your Primary Care Provider or Care Team for specific guidance, particularly if your symptoms worsen.

## 2020-09-09 NOTE — PROGRESS NOTES
Yellow alert noted in remote symptom monitoring program. Messaged patient to notify Lashawn Bundy if symptoms have worsened since yesterday or if they would like to have a nurse reach out.

## 2020-09-12 LAB
BACTERIA SPEC CULT: NORMAL
SERVICE CMNT-IMP: NORMAL

## 2020-09-18 ENCOUNTER — TELEPHONE (OUTPATIENT)
Dept: CASE MANAGEMENT | Age: 55
End: 2020-09-18

## 2020-09-18 NOTE — TELEPHONE ENCOUNTER
RN called patient to review Advance Care Planning and to schedule an in depth conversation with completion of an Advance Medical Directive.   Patient agreed to receive ACP information via email and a follow up in depth conversation was scheduled for 9/24/20 @ 1:00PM.   RN will email the following: ACP Information sheet, information regarding choosing a health care agent, Feeding tube information, Ventilator information, CPR information, Sample AMD.  Varney Opitz, RN

## 2020-09-24 ENCOUNTER — TELEPHONE (OUTPATIENT)
Dept: CASE MANAGEMENT | Age: 55
End: 2020-09-24

## 2020-09-24 NOTE — TELEPHONE ENCOUNTER
RN called patient for previously scheduled ACP conversation to advise that this RN would need to call back in 10 minutes after the agreed upon appointment time. Patient agreed to this. RN called back later, and no answer. Left message for return call. RN also discovered that the ACP related information had not been emailed to patient as promised on 9/18/20. This was done today.    Alaina Go RN

## 2020-09-25 ENCOUNTER — TELEPHONE (OUTPATIENT)
Dept: CASE MANAGEMENT | Age: 55
End: 2020-09-25

## 2020-09-25 NOTE — TELEPHONE ENCOUNTER
RN called patient as requested by patient. No answer, left message for return call.  Jocelyn Katz RN

## 2020-09-25 NOTE — TELEPHONE ENCOUNTER
RN called patient for follow up on missed ACP conversation scheduled for yesterday, 9/24/20. SHe advised that she had a telehealth appointment yesterday that she had forgotten about. She has not reviewed the ACP information emailed to her. She agreed to do so and RN will call back later this afternoon for in depth ACP conversation.   Varney Opitz, RN

## 2020-10-01 ENCOUNTER — TELEPHONE (OUTPATIENT)
Dept: CASE MANAGEMENT | Age: 55
End: 2020-10-01

## 2020-10-01 NOTE — TELEPHONE ENCOUNTER
RN called patient to inquire if she wanted to proceed with completing an Advance Medical Directive. She requested that I call back after 3:00PM as her granddaughter is doing her school work virtually and she is assisting her.   RN agreed to call back after 3;00PM.  Asa Record, RN

## 2020-10-01 NOTE — TELEPHONE ENCOUNTER
RN attempted to call patient back as she requested during phone call earlier today. No answer, left message for return call. Patient has my contact information if she decides to proceed with Advance Care Planning. No additional outreach calls will be made.  Duglas Huang RN

## 2021-01-20 ENCOUNTER — APPOINTMENT (OUTPATIENT)
Dept: GENERAL RADIOLOGY | Age: 56
End: 2021-01-20
Attending: EMERGENCY MEDICINE
Payer: COMMERCIAL

## 2021-01-20 ENCOUNTER — HOSPITAL ENCOUNTER (EMERGENCY)
Age: 56
Discharge: HOME | End: 2021-01-20
Attending: EMERGENCY MEDICINE
Payer: COMMERCIAL

## 2021-01-20 VITALS
RESPIRATION RATE: 16 BRPM | SYSTOLIC BLOOD PRESSURE: 129 MMHG | BODY MASS INDEX: 38.88 KG/M2 | HEART RATE: 69 BPM | DIASTOLIC BLOOD PRESSURE: 91 MMHG | WEIGHT: 287.04 LBS | OXYGEN SATURATION: 99 % | HEIGHT: 72 IN | TEMPERATURE: 98.4 F

## 2021-01-20 DIAGNOSIS — R07.9 CHEST PAIN, UNSPECIFIED TYPE: Primary | ICD-10-CM

## 2021-01-20 DIAGNOSIS — M54.9 ACUTE RIGHT-SIDED BACK PAIN, UNSPECIFIED BACK LOCATION: ICD-10-CM

## 2021-01-20 LAB
ALBUMIN SERPL-MCNC: 3.6 G/DL (ref 3.5–5)
ALBUMIN/GLOB SERPL: 0.7 {RATIO} (ref 1.1–2.2)
ALP SERPL-CCNC: 96 U/L (ref 45–117)
ALT SERPL-CCNC: 27 U/L (ref 12–78)
ANION GAP SERPL CALC-SCNC: 3 MMOL/L (ref 5–15)
AST SERPL-CCNC: 15 U/L (ref 15–37)
ATRIAL RATE: 81 BPM
BASOPHILS # BLD: 0.1 K/UL (ref 0–0.1)
BASOPHILS NFR BLD: 1 % (ref 0–1)
BILIRUB SERPL-MCNC: 0.3 MG/DL (ref 0.2–1)
BUN SERPL-MCNC: 8 MG/DL (ref 6–20)
BUN/CREAT SERPL: 11 (ref 12–20)
CALCIUM SERPL-MCNC: 9.1 MG/DL (ref 8.5–10.1)
CALCULATED P AXIS, ECG09: 62 DEGREES
CALCULATED R AXIS, ECG10: -3 DEGREES
CALCULATED T AXIS, ECG11: 42 DEGREES
CHLORIDE SERPL-SCNC: 105 MMOL/L (ref 97–108)
CO2 SERPL-SCNC: 30 MMOL/L (ref 21–32)
CREAT SERPL-MCNC: 0.76 MG/DL (ref 0.55–1.02)
DIAGNOSIS, 93000: NORMAL
DIFFERENTIAL METHOD BLD: NORMAL
EOSINOPHIL # BLD: 0.2 K/UL (ref 0–0.4)
EOSINOPHIL NFR BLD: 3 % (ref 0–7)
ERYTHROCYTE [DISTWIDTH] IN BLOOD BY AUTOMATED COUNT: 13.9 % (ref 11.5–14.5)
GLOBULIN SER CALC-MCNC: 4.9 G/DL (ref 2–4)
GLUCOSE SERPL-MCNC: 79 MG/DL (ref 65–100)
HCT VFR BLD AUTO: 37.1 % (ref 35–47)
HGB BLD-MCNC: 12.1 G/DL (ref 11.5–16)
IMM GRANULOCYTES # BLD AUTO: 0 K/UL (ref 0–0.04)
IMM GRANULOCYTES NFR BLD AUTO: 0 % (ref 0–0.5)
LIPASE SERPL-CCNC: 94 U/L (ref 73–393)
LYMPHOCYTES # BLD: 2.8 K/UL (ref 0.8–3.5)
LYMPHOCYTES NFR BLD: 46 % (ref 12–49)
MCH RBC QN AUTO: 27.3 PG (ref 26–34)
MCHC RBC AUTO-ENTMCNC: 32.6 G/DL (ref 30–36.5)
MCV RBC AUTO: 83.6 FL (ref 80–99)
MONOCYTES # BLD: 0.5 K/UL (ref 0–1)
MONOCYTES NFR BLD: 7 % (ref 5–13)
NEUTS SEG # BLD: 2.7 K/UL (ref 1.8–8)
NEUTS SEG NFR BLD: 43 % (ref 32–75)
NRBC # BLD: 0 K/UL (ref 0–0.01)
NRBC BLD-RTO: 0 PER 100 WBC
P-R INTERVAL, ECG05: 170 MS
PLATELET # BLD AUTO: 291 K/UL (ref 150–400)
PMV BLD AUTO: 9.2 FL (ref 8.9–12.9)
POTASSIUM SERPL-SCNC: 3.8 MMOL/L (ref 3.5–5.1)
PROT SERPL-MCNC: 8.5 G/DL (ref 6.4–8.2)
Q-T INTERVAL, ECG07: 374 MS
QRS DURATION, ECG06: 82 MS
QTC CALCULATION (BEZET), ECG08: 434 MS
RBC # BLD AUTO: 4.44 M/UL (ref 3.8–5.2)
SODIUM SERPL-SCNC: 138 MMOL/L (ref 136–145)
TROPONIN I SERPL-MCNC: <0.05 NG/ML
TROPONIN I SERPL-MCNC: <0.05 NG/ML
VENTRICULAR RATE, ECG03: 81 BPM
WBC # BLD AUTO: 6.2 K/UL (ref 3.6–11)

## 2021-01-20 PROCEDURE — 74011000250 HC RX REV CODE- 250: Performed by: EMERGENCY MEDICINE

## 2021-01-20 PROCEDURE — 71046 X-RAY EXAM CHEST 2 VIEWS: CPT

## 2021-01-20 PROCEDURE — 85025 COMPLETE CBC W/AUTO DIFF WBC: CPT

## 2021-01-20 PROCEDURE — 80053 COMPREHEN METABOLIC PANEL: CPT

## 2021-01-20 PROCEDURE — 74011250637 HC RX REV CODE- 250/637: Performed by: EMERGENCY MEDICINE

## 2021-01-20 PROCEDURE — 83690 ASSAY OF LIPASE: CPT

## 2021-01-20 PROCEDURE — 93005 ELECTROCARDIOGRAM TRACING: CPT

## 2021-01-20 PROCEDURE — 84484 ASSAY OF TROPONIN QUANT: CPT

## 2021-01-20 PROCEDURE — 99284 EMERGENCY DEPT VISIT MOD MDM: CPT

## 2021-01-20 PROCEDURE — 36415 COLL VENOUS BLD VENIPUNCTURE: CPT

## 2021-01-20 RX ADMIN — LIDOCAINE HYDROCHLORIDE 40 ML: 20 SOLUTION ORAL; TOPICAL at 18:47

## 2021-01-20 NOTE — DISCHARGE INSTRUCTIONS
You were seen in the ER for your symptoms. Please call your cardiologist for an appointment. Please return for worsening symptoms at any time. We are always happy to reevaluate you.

## 2021-01-20 NOTE — ED PROVIDER NOTES
EMERGENCY DEPARTMENT HISTORY AND PHYSICAL EXAM      Date: 1/20/2021  Patient Name: Ladarius Mccracken    History of Presenting Illness     Chief Complaint   Patient presents with    Chest Pain     Chest pain that started 2 to 3 days ago. States it is like tightness. History Provided By: Patient    HPI: Ladarius Mccracken, 61 y.o. female presents to the ED with cc of chest pain. Patient has a history of GERD and palpitations and anxiety. Has been told she has borderline diabetes and high cholesterol. Patient reports 2 days ago developed chest fullness. Patient reports has tried TUMs and pantoprazole without relief. Patient reports she developed pain in her right shoulder that is worse with movement. No trauma. Today she developed a chest pressure that is intermittent and lasts \"minutes\" it is central.  Patient denies any clear alleviating or aggravating symptoms. She has seen a cardiologist in the past, stress test 2 years ago was normal.  Patient reports she will occasionally get \"winded\" with exertion, but no worsening) or chest pain. Due to continuation of symptoms, patient presents to ED for evaluation. There are no other complaints, changes, or physical findings at this time. PCP: Lauren Fischer MD    No current facility-administered medications on file prior to encounter. Current Outpatient Medications on File Prior to Encounter   Medication Sig Dispense Refill    pantoprazole (PROTONIX) 40 mg tablet Take 40 mg by mouth daily.  ALPRAZolam (XANAX) 1 mg tablet Take 1 mg by mouth three (3) times daily as needed for Anxiety.  albuterol (PROVENTIL HFA, VENTOLIN HFA, PROAIR HFA) 90 mcg/actuation inhaler Take 2 Puffs by inhalation every four (4) hours as needed for Wheezing.  1 Inhaler 0       Past History     Past Medical History:  Past Medical History:   Diagnosis Date    Acute bronchitis Jan 2015    Acute sinusitis     Anxiety     Arthritis     Back pain     Cocaine abuse with cocaine-induced disorder ([facility] [address]     Delivery normal     x4    Dizziness     Gastrointestinal disorder     \"stomach ulcer\"    Other ill-defined conditions(799.89)     anemia    Palpitations     Peptic ulcer disease     Sciatica     TIA (transient ischemic attack)     Upper respiratory infection        Past Surgical History:  Past Surgical History:   Procedure Laterality Date    ABDOMEN SURGERY PROC UNLISTED      ulcer    HX GYN      tubal ligation    HX HEENT      sinus surgery    HX TONSILLECTOMY      HX TONSILLECTOMY         Family History:  Family History   Problem Relation Age of Onset    Stroke Mother     Heart Disease Mother        Social History:  Social History     Tobacco Use    Smoking status: Former Smoker     Packs/day: 0.50     Years: 15.00     Pack years: 7.50     Quit date: 2016     Years since quittin.8    Smokeless tobacco: Never Used   Substance Use Topics    Alcohol use: No    Drug use: No       Allergies: Allergies   Allergen Reactions    Penicillins Anaphylaxis     Has taken cephalexin without problem    Hydrocodone Itching         Review of Systems   Review of Systems   Constitutional: Negative for activity change, chills and fever. HENT: Negative for facial swelling and voice change. Eyes: Negative for redness. Respiratory: Positive for chest tightness. Negative for cough, shortness of breath and wheezing. Cardiovascular: Positive for chest pain. Negative for leg swelling. Gastrointestinal: Negative for abdominal pain, diarrhea, nausea and vomiting. Genitourinary: Negative for decreased urine volume. Musculoskeletal: Negative for gait problem. Skin: Negative for pallor and rash. Neurological: Negative for tremors and facial asymmetry. Psychiatric/Behavioral: Negative for agitation. All other systems reviewed and are negative. Physical Exam   Physical Exam  Vitals signs and nursing note reviewed.    Constitutional:       Comments: 61 YOF, appears well, no distress, sitting on edge of stretcher   HENT:      Head: Normocephalic and atraumatic. Neck:      Musculoskeletal: Normal range of motion. Cardiovascular:      Rate and Rhythm: Normal rate and regular rhythm. Pulses:           Radial pulses are 2+ on the right side and 2+ on the left side. Heart sounds: Normal heart sounds. No murmur. No friction rub. No gallop. Pulmonary:      Effort: Pulmonary effort is normal. No tachypnea. Breath sounds: Normal breath sounds. Abdominal:      Palpations: Abdomen is soft. Tenderness: There is no abdominal tenderness. Musculoskeletal: Normal range of motion. Right lower leg: No edema. Left lower leg: No edema. Comments: There is some reproduction of back pain with patient rotating L and R.    Skin:     General: Skin is warm. Capillary Refill: Capillary refill takes less than 2 seconds. Neurological:      General: No focal deficit present. Mental Status: She is alert.    Psychiatric:         Mood and Affect: Mood normal.         Diagnostic Study Results     Labs -     Recent Results (from the past 12 hour(s))   EKG, 12 LEAD, INITIAL    Collection Time: 01/20/21 12:52 PM   Result Value Ref Range    Ventricular Rate 81 BPM    Atrial Rate 81 BPM    P-R Interval 170 ms    QRS Duration 82 ms    Q-T Interval 374 ms    QTC Calculation (Bezet) 434 ms    Calculated P Axis 62 degrees    Calculated R Axis -3 degrees    Calculated T Axis 42 degrees    Diagnosis       Normal sinus rhythm  When compared with ECG of 07-SEP-2020 20:01,  No significant change was found  Confirmed by Rehana Rodgers ([id number]) on 1/20/2021 2:50:10 PM     CBC WITH AUTOMATED DIFF    Collection Time: 01/20/21  4:16 PM   Result Value Ref Range    WBC 6.2 3.6 - 11.0 K/uL    RBC 4.44 3.80 - 5.20 M/uL    HGB 12.1 11.5 - 16.0 g/dL    HCT 37.1 35.0 - 47.0 %    MCV 83.6 80.0 - 99.0 FL    MCH 27.3 26.0 - 34.0 PG    MCHC 32.6 30.0 - 36.5 g/dL    RDW 13.9 11.5 - 14.5 %    PLATELET 851 702 - 951 K/uL    MPV 9.2 8.9 - 12.9 FL    NRBC 0.0 0  WBC    ABSOLUTE NRBC 0.00 0.00 - 0.01 K/uL    NEUTROPHILS 43 32 - 75 %    LYMPHOCYTES 46 12 - 49 %    MONOCYTES 7 5 - 13 %    EOSINOPHILS 3 0 - 7 %    BASOPHILS 1 0 - 1 %    IMMATURE GRANULOCYTES 0 0.0 - 0.5 %    ABS. NEUTROPHILS 2.7 1.8 - 8.0 K/UL    ABS. LYMPHOCYTES 2.8 0.8 - 3.5 K/UL    ABS. MONOCYTES 0.5 0.0 - 1.0 K/UL    ABS. EOSINOPHILS 0.2 0.0 - 0.4 K/UL    ABS. BASOPHILS 0.1 0.0 - 0.1 K/UL    ABS. IMM. GRANS. 0.0 0.00 - 0.04 K/UL    DF AUTOMATED     METABOLIC PANEL, COMPREHENSIVE    Collection Time: 01/20/21  4:16 PM   Result Value Ref Range    Sodium 138 136 - 145 mmol/L    Potassium 3.8 3.5 - 5.1 mmol/L    Chloride 105 97 - 108 mmol/L    CO2 30 21 - 32 mmol/L    Anion gap 3 (L) 5 - 15 mmol/L    Glucose 79 65 - 100 mg/dL    BUN 8 6 - 20 MG/DL    Creatinine 0.76 0.55 - 1.02 MG/DL    BUN/Creatinine ratio 11 (L) 12 - 20      GFR est AA >60 >60 ml/min/1.73m2    GFR est non-AA >60 >60 ml/min/1.73m2    Calcium 9.1 8.5 - 10.1 MG/DL    Bilirubin, total 0.3 0.2 - 1.0 MG/DL    ALT (SGPT) 27 12 - 78 U/L    AST (SGOT) 15 15 - 37 U/L    Alk. phosphatase 96 45 - 117 U/L    Protein, total 8.5 (H) 6.4 - 8.2 g/dL    Albumin 3.6 3.5 - 5.0 g/dL    Globulin 4.9 (H) 2.0 - 4.0 g/dL    A-G Ratio 0.7 (L) 1.1 - 2.2     TROPONIN I    Collection Time: 01/20/21  4:16 PM   Result Value Ref Range    Troponin-I, Qt. <0.05 <0.05 ng/mL   LIPASE    Collection Time: 01/20/21  4:16 PM   Result Value Ref Range    Lipase 94 73 - 393 U/L   TROPONIN I    Collection Time: 01/20/21  6:08 PM   Result Value Ref Range    Troponin-I, Qt. <0.05 <0.05 ng/mL       Radiologic Studies -   XR CHEST PA LAT   Final Result   No acute intrathoracic disease. CT Results  (Last 48 hours)    None        CXR Results  (Last 48 hours)               01/20/21 1514  XR CHEST PA LAT Final result    Impression:  No acute intrathoracic disease.            Narrative:  Clinical history: chest pain   INDICATION:   chest pain   COMPARISON: 9/7/2020       FINDINGS:    PA and lateral views of the chest are obtained. The cardiopericardial silhouette is within normal limits. There is no pleural   effusion, pneumothorax or focal consolidation present. Medical Decision Making   I am the first provider for this patient. I reviewed the vital signs, available nursing notes, past medical history, past surgical history, family history and social history. Vital Signs-Reviewed the patient's vital signs. Patient Vitals for the past 12 hrs:   Temp Pulse Resp BP SpO2   01/20/21 1730    (!) 129/91 99 %   01/20/21 1700    129/81 99 %   01/20/21 1600    (!) 118/93 99 %   01/20/21 1530  69  133/79 98 %   01/20/21 1243 98.4 °F (36.9 °C) 87 16 (!) 175/98 98 %       Records Reviewed: Nursing Notes, Old Medical Records and Previous Laboratory Studies    Provider Notes (Medical Decision Making):     79-year-old female presents emergency department with a chief complaint of chest pain. Currently chest pain-free. Vitals are initially notable for elevated blood pressure at triage, but on my assessment, patient is resting with normal blood pressure. Patient's EKG is nonischemic, patient has atypical history of chest pain with short duration of symptoms and non-exertional component. I am less concerned for an unstable angina type picture. Back pain appears to be more musculoskeletal on history and exam. Considered dissection, but not suspicious on history. 2+ pulses. No clinical evidence for PE. Doubt PNA/PNx. Patient did have a normal stress test 3 years ago. Discussed that she will need to follow-up with her cardiologist if her ED work-up is negative. She expressed understanding. ED Course:   Initial assessment performed. The patients presenting problems have been discussed, and they are in agreement with the care plan formulated and outlined with them.   I have encouraged them to ask questions as they arise throughout their visit. ED Course as of Jan 20 2218 Wed Jan 20, 2021   1434 Preliminary EKG interpreted by me. Shows SR with a HR of 81. No ST elevations or depressions concerning for ischemia. Normal intervals. [MB]   5495 Chest x-ray with normal mediastinum. Labs are unremarkable. Troponin is negative. Patient reassessed and resting comfortably in bed. Requesting GI cocktail. [MB]   0077 Troponin-I, Qt.: <0.05 [MB]      ED Course User Index  [MB] Gurinder Moe MD     Reassessments as above. Labs and ED course reviewed. Patient was discharged home and was provided strict return precautions. Patient to follow-up with PCP or specialist per discharge instructions or return to ED for worsening symptoms. Oswaldo Johnson MD      Disposition:    Discharged    DISCHARGE PLAN:  1. Discharge Medication List as of 1/20/2021  6:50 PM        2. Follow-up Information     Follow up With Specialties Details Why Contact Info    Shon Weiss MD Family Medicine In 1 week  [address] [place] [address]  [phone]      Your cardiologist - call for stress test            3. Return to ED if worse     Diagnosis     Clinical Impression:   1. Chest pain, unspecified type    2. Acute right-sided back pain, unspecified back location        Attestations:    Oswaldo Johnson MD    Please note that this dictation was completed with [employer], the computer voice recognition software. Quite often unanticipated grammatical, syntax, homophones, and other interpretive errors are inadvertently transcribed by the computer software. Please disregard these errors. Please excuse any errors that have escaped final proofreading. Thank you.

## 2021-01-20 NOTE — ED NOTES
1500: Assumed care of patient from triage. Patient ambulatory to room at this time. Accompanied by family. Placed on monitor, call bell in reach. 1616: Lab samples hemolyzed. EDT at bedside for redraw. 1815: RN at bedside for Trop redraw. Patient ambulatory to RR at this time. Returned to bed, call bell in reach. 1920: I have reviewed discharge instructions with the patient. The patient verbalized understanding. Patient ambulatory out of ED at this time.

## 2023-01-22 ENCOUNTER — APPOINTMENT (OUTPATIENT)
Dept: GENERAL RADIOLOGY | Age: 58
End: 2023-01-22
Attending: PHYSICIAN ASSISTANT
Payer: COMMERCIAL

## 2023-01-22 ENCOUNTER — HOSPITAL ENCOUNTER (EMERGENCY)
Age: 58
Discharge: HOME | End: 2023-01-22
Attending: EMERGENCY MEDICINE
Payer: COMMERCIAL

## 2023-01-22 VITALS
SYSTOLIC BLOOD PRESSURE: 133 MMHG | WEIGHT: 267.42 LBS | DIASTOLIC BLOOD PRESSURE: 82 MMHG | OXYGEN SATURATION: 98 % | TEMPERATURE: 97.5 F | BODY MASS INDEX: 36.22 KG/M2 | HEIGHT: 72 IN | HEART RATE: 73 BPM | RESPIRATION RATE: 20 BRPM

## 2023-01-22 DIAGNOSIS — S29.019A STRAIN OF THORACIC REGION, INITIAL ENCOUNTER: Primary | ICD-10-CM

## 2023-01-22 PROCEDURE — 96372 THER/PROPH/DIAG INJ SC/IM: CPT

## 2023-01-22 PROCEDURE — 72072 X-RAY EXAM THORAC SPINE 3VWS: CPT

## 2023-01-22 PROCEDURE — 74011250636 HC RX REV CODE- 250/636: Performed by: PHYSICIAN ASSISTANT

## 2023-01-22 PROCEDURE — 99284 EMERGENCY DEPT VISIT MOD MDM: CPT

## 2023-01-22 RX ORDER — OXYCODONE AND ACETAMINOPHEN 5; 325 MG/1; MG/1
1 TABLET ORAL
Qty: 10 TABLET | Refills: 0 | Status: SHIPPED | OUTPATIENT
Start: 2023-01-22 | End: 2023-01-25

## 2023-01-22 RX ORDER — KETOROLAC TROMETHAMINE 30 MG/ML
30 INJECTION, SOLUTION INTRAMUSCULAR; INTRAVENOUS
Status: COMPLETED | OUTPATIENT
Start: 2023-01-22 | End: 2023-01-22

## 2023-01-22 RX ORDER — CYCLOBENZAPRINE HCL 10 MG
10 TABLET ORAL
Qty: 20 TABLET | Refills: 0 | Status: SHIPPED | OUTPATIENT
Start: 2023-01-22

## 2023-01-22 RX ADMIN — KETOROLAC TROMETHAMINE 30 MG: 30 INJECTION, SOLUTION INTRAMUSCULAR at 18:50

## 2023-01-22 NOTE — Clinical Note
Bello Bosch was seen and treated in our emergency department on 1/22/2023. Please excuse her from work January 23 and 24.           Vilma Garcia

## 2023-01-22 NOTE — ED PROVIDER NOTES
[facility] EMERGENCY DEPT  EMERGENCY DEPARTMENT ENCOUNTER       Pt Name: Eufemia Luu  MRN: [id number]  [place] 1965  Date of evaluation: 1/22/2023  Provider: HAYDEE Chavis   PCP: Lali Barry MD  Note Started: 6:48 PM 1/22/23     CHIEF COMPLAINT       Chief Complaint   Patient presents with    Back Pain     Patient reports bending down to do something today and since then has had pain in the mid lumbar and thoracic region        HISTORY OF PRESENT ILLNESS: 1 or more elements      History From: Patient  HPI Limitations : None     Eufemia Luu is a 60 y.o. female who presents with back pain. The patient was bending over to pick something up today when she had the sudden onset of lower thoracic back pain. The pain is the worst over the spine but radiates to the bilateral paraspinal areas. She reports it was difficult to stand back up after it started and the pain has been worse with movements of her trunk since then. She took 800 mg ibuprofen without significant relief. She denies radiation of pain into extremities, extremity numbness or weakness, bowel or bladder dysfunction, saddle anesthesia, fevers. Nursing Notes were all reviewed and agreed with or any disagreements were addressed in the HPI. REVIEW OF SYSTEMS      Review of Systems     Positives and Pertinent negatives as per HPI.     PAST HISTORY     Past Medical History:  Past Medical History:   Diagnosis Date    Acute bronchitis Jan 2015    Acute sinusitis     Anxiety     Arthritis     Back pain     Cocaine abuse with cocaine-induced disorder (HCC)     Delivery normal     x4    Dizziness     Gastrointestinal disorder     \"stomach ulcer\"    Other ill-defined conditions(799.89)     anemia    Palpitations     Peptic ulcer disease     Sciatica     TIA (transient ischemic attack)     Upper respiratory infection        Past Surgical History:  Past Surgical History:   Procedure Laterality Date    HX GYN      tubal ligation    HX HEENT      sinus surgery    HX TONSILLECTOMY      HX TONSILLECTOMY      WA UNLISTED PROCEDURE ABDOMEN PERITONEUM & OMENTUM      ulcer       Family History:  Family History   Problem Relation Age of Onset    Stroke Mother     Heart Disease Mother        Social History:  Social History     Tobacco Use    Smoking status: Former     Packs/day: 0.50     Years: 15.00     Pack years: 7.50     Types: Cigarettes     Quit date: 2016     Years since quittin.8    Smokeless tobacco: Never   Substance Use Topics    Alcohol use: No    Drug use: No       Allergies: Allergies   Allergen Reactions    Penicillins Anaphylaxis     Has taken cephalexin without problem    Hydrocodone Itching       CURRENT MEDICATIONS      Discharge Medication List as of 2023  7:54 PM        CONTINUE these medications which have NOT CHANGED    Details   pantoprazole (PROTONIX) 40 mg tablet Take 40 mg by mouth daily. , Historical Med      albuterol (PROVENTIL HFA, VENTOLIN HFA, PROAIR HFA) 90 mcg/actuation inhaler Take 2 Puffs by inhalation every four (4) hours as needed for Wheezing., Normal, Disp-1 Inhaler, R-0      ALPRAZolam (XANAX) 1 mg tablet Take 1 mg by mouth three (3) times daily as needed for Anxiety. , Historical Med             PHYSICAL EXAM      ED Triage Vitals   ED Encounter Vitals Group      BP 23 1539 133/82      Pulse (Heart Rate) 23 1539 73      Resp Rate 23 1539 20      Temp 23 1539 97.5 °F (36.4 °C)      Temp src --       O2 Sat (%) 23 1537 98 %      Weight 23 1537 267 lb 6.7 oz      Height 23 1537 6'        Physical Exam  Vitals and nursing note reviewed. Constitutional:       Comments: Interactive, conversant female in no acute distress. Cardiovascular:      Rate and Rhythm: Normal rate and regular rhythm. Heart sounds: No murmur heard. Pulmonary:      Effort: Pulmonary effort is normal.      Breath sounds: Normal breath sounds. No wheezing.    Musculoskeletal:      Comments: Midline tenderness to palpation of the lower thoracic spine and the bilateral thoracic paraspinal muscles. Pain increased with movement. Neurological:      Mental Status: She is alert. Comments: Strength 5 out of 5 in bilateral lower extremities. Distal sensation intact bilaterally. DIAGNOSTIC RESULTS   LABS:     No results found for this or any previous visit (from the past 12 hour(s)). EKG: When ordered, EKG's are interpreted by the Emergency Department Physician in the absence of a cardiologist.  Please see their note for interpretation of EKG. RADIOLOGY:  Non-plain film images such as CT, Ultrasound and MRI are read by the radiologist. Plain radiographic images are visualized and preliminarily interpreted by the ED Provider with the below findings:          Interpretation per the Radiologist below, if available at the time of this note:     XR SPINE [facility] 3 V    Result Date: 1/22/2023  XR SPINE [facility] 3 V, 1/22/2023 7:02 PM INDICATION:  lower thoracic pain after bending over today. COMPARISON: Chest radiograph January 20, 2021 TECHNIQUE: AP, lateral, and swimmer's lateral views of the thoracic spine FINDINGS: The vertebral body heights are preserved. There is multilevel disc space narrowing and endplate osteophytosis of the upper thoracic spine. No acute fracture deformity is seen. The included lungs are clear. No evidence of acute fracture or subluxation. There is multilevel degenerative disc disease of the upper thoracic spine.         PROCEDURES   Unless otherwise noted below, none  Procedures     CRITICAL CARE TIME       EMERGENCY DEPARTMENT COURSE and DIFFERENTIAL DIAGNOSIS/MDM   Vitals:    Vitals:    01/22/23 1537 01/22/23 1539   BP:  133/82   Pulse:  73   Resp:  20   Temp:  97.5 °F (36.4 °C)   SpO2: 98%    Weight: 121.3 kg (267 lb 6.7 oz)    Height: 6' (1.829 m)         Patient was given the following medications:  Medications   ketorolac (TORADOL) injection 30 mg (30 mg IntraMUSCular Given 1/22/23 1850)       CONSULTS: (Who and What was discussed)  None    Chronic Conditions: Arthritis, peptic ulcer disease    Social Determinants affecting Dx or Tx: None    Records Reviewed (source and summary of external records): Nursing Notes and Old Medical Records    CC/HPI Summary, DDx, ED Course, and Reassessment: This is a 62-year-old female who presents with lower thoracic back pain after bending over to pick something up today. Differential diagnosis includes muscle strain/spasm, herniated disc, degenerative disc disease, fracture. She is afebrile with stable vital signs. She has reproducible tenderness to palpation over the midline lower thoracic spine and bilateral thoracic paraspinal muscles that is worse on movement. She has no back pain red flag signs and neurologic exam is intact -do not suspect cauda equina syndrome or infectious spinal lesion. We will plan to obtain x-rays and treat symptomatically. X-ray shows degenerative changes without evidence of fracture or dislocation. Discussed symptomatic treatment. Advise follow-up with PCP for recheck and discussed return precautions. Disposition Considerations (Tests not done, Shared Decision Making, Pt Expectation of Test or Tx.):      FINAL IMPRESSION     1. Strain of thoracic region, initial encounter          DISPOSITION/PLAN   Discharged    Discharge Note: The patient is stable for discharge home. The signs, symptoms, diagnosis, and discharge instructions have been discussed, understanding conveyed, and agreed upon. The patient is to follow up as recommended or return to ER should their symptoms worsen.       PATIENT REFERRED TO:  Follow-up Information       Follow up With Specialties Details Why Contact Info    Deysi Whalen MD Family Medicine Schedule an appointment as soon as possible for a visit in 1 week  [address] [place] [address]  [phone]      [facility] EMERGENCY DEPT Emergency Medicine Go to  If symptoms worsen [address] [place] [address]  [phone]              DISCHARGE MEDICATIONS:  Discharge Medication List as of 1/22/2023  7:54 PM        START taking these medications    Details   oxyCODONE-acetaminophen (Percocet) 5-325 mg per tablet Take 1 Tablet by mouth every six (6) hours as needed for Pain for up to 3 days. Max Daily Amount: 4 Tablets., Normal, Disp-10 Tablet, R-0Supervising physician Ludwin Bolton MD      cyclobenzaprine (FLEXERIL) 10 mg tablet Take 1 Tablet by mouth three (3) times daily as needed for Muscle Spasm(s). , Normal, Disp-20 Tablet, R-0           CONTINUE these medications which have NOT CHANGED    Details   pantoprazole (PROTONIX) 40 mg tablet Take 40 mg by mouth daily. , Historical Med      albuterol (PROVENTIL HFA, VENTOLIN HFA, PROAIR HFA) 90 mcg/actuation inhaler Take 2 Puffs by inhalation every four (4) hours as needed for Wheezing., Normal, Disp-1 Inhaler, R-0      ALPRAZolam (XANAX) 1 mg tablet Take 1 mg by mouth three (3) times daily as needed for Anxiety. , Historical Med               DISCONTINUED MEDICATIONS:  Discharge Medication List as of 1/22/2023  7:54 PM          Shared Not Shared ENEDINA: I have seen and evaluated the patient. My supervision physician was available for consultation. I am the Primary Clinician of Record. HAYDEE Vidal (electronically signed)    (Please note that parts of this dictation were completed with voice recognition software. Quite often unanticipated grammatical, syntax, homophones, and other interpretive errors are inadvertently transcribed by the computer software. Please disregards these errors.  Please excuse any errors that have escaped final proofreading.)

## 2023-04-03 ENCOUNTER — HOSPITAL ENCOUNTER (EMERGENCY)
Age: 58
End: 2023-04-03
Attending: EMERGENCY MEDICINE
Payer: MEDICARE

## 2023-04-03 PROCEDURE — 99285 EMERGENCY DEPT VISIT HI MDM: CPT

## 2023-04-04 ENCOUNTER — APPOINTMENT (OUTPATIENT)
Dept: GENERAL RADIOLOGY | Age: 58
End: 2023-04-04
Attending: EMERGENCY MEDICINE
Payer: MEDICARE

## 2023-04-04 LAB
ALBUMIN SERPL-MCNC: 3.3 G/DL (ref 3.5–5)
ALBUMIN/GLOB SERPL: 0.7 (ref 1.1–2.2)
ALP SERPL-CCNC: 103 U/L (ref 45–117)
ALT SERPL-CCNC: 19 U/L (ref 12–78)
ANION GAP SERPL CALC-SCNC: 6 MMOL/L (ref 5–15)
AST SERPL-CCNC: 10 U/L (ref 15–37)
ATRIAL RATE: 80 BPM
BASOPHILS # BLD: 0.1 K/UL (ref 0–0.1)
BASOPHILS NFR BLD: 1 % (ref 0–1)
BILIRUB SERPL-MCNC: 0.2 MG/DL (ref 0.2–1)
BNP SERPL-MCNC: 24 PG/ML
BUN SERPL-MCNC: 14 MG/DL (ref 6–20)
BUN/CREAT SERPL: 18 (ref 12–20)
CALCIUM SERPL-MCNC: 8.9 MG/DL (ref 8.5–10.1)
CALCULATED P AXIS, ECG09: 59 DEGREES
CALCULATED R AXIS, ECG10: -11 DEGREES
CALCULATED T AXIS, ECG11: 42 DEGREES
CHLORIDE SERPL-SCNC: 106 MMOL/L (ref 97–108)
CO2 SERPL-SCNC: 26 MMOL/L (ref 21–32)
CREAT SERPL-MCNC: 0.8 MG/DL (ref 0.55–1.02)
DIAGNOSIS, 93000: NORMAL
DIFFERENTIAL METHOD BLD: ABNORMAL
EOSINOPHIL # BLD: 0.4 K/UL (ref 0–0.4)
EOSINOPHIL NFR BLD: 5 % (ref 0–7)
ERYTHROCYTE [DISTWIDTH] IN BLOOD BY AUTOMATED COUNT: 13.9 % (ref 11.5–14.5)
GLOBULIN SER CALC-MCNC: 4.5 G/DL (ref 2–4)
GLUCOSE SERPL-MCNC: 113 MG/DL (ref 65–100)
HCT VFR BLD AUTO: 37.4 % (ref 35–47)
HGB BLD-MCNC: 12.4 G/DL (ref 11.5–16)
IMM GRANULOCYTES # BLD AUTO: 0 K/UL (ref 0–0.04)
IMM GRANULOCYTES NFR BLD AUTO: 0 % (ref 0–0.5)
LYMPHOCYTES # BLD: 3.6 K/UL (ref 0.8–3.5)
LYMPHOCYTES NFR BLD: 46 % (ref 12–49)
MCH RBC QN AUTO: 27.9 PG (ref 26–34)
MCHC RBC AUTO-ENTMCNC: 33.2 G/DL (ref 30–36.5)
MCV RBC AUTO: 84 FL (ref 80–99)
MONOCYTES # BLD: 0.5 K/UL (ref 0–1)
MONOCYTES NFR BLD: 7 % (ref 5–13)
NEUTS SEG # BLD: 3.2 K/UL (ref 1.8–8)
NEUTS SEG NFR BLD: 41 % (ref 32–75)
NRBC # BLD: 0 K/UL (ref 0–0.01)
NRBC BLD-RTO: 0 PER 100 WBC
P-R INTERVAL, ECG05: 184 MS
PLATELET # BLD AUTO: 370 K/UL (ref 150–400)
PMV BLD AUTO: 9.8 FL (ref 8.9–12.9)
POTASSIUM SERPL-SCNC: 3.4 MMOL/L (ref 3.5–5.1)
PROT SERPL-MCNC: 7.8 G/DL (ref 6.4–8.2)
Q-T INTERVAL, ECG07: 394 MS
QRS DURATION, ECG06: 82 MS
QTC CALCULATION (BEZET), ECG08: 454 MS
RBC # BLD AUTO: 4.45 M/UL (ref 3.8–5.2)
SODIUM SERPL-SCNC: 138 MMOL/L (ref 136–145)
TROPONIN I SERPL HS-MCNC: <4 NG/L (ref 0–51)
VENTRICULAR RATE, ECG03: 80 BPM
WBC # BLD AUTO: 7.8 K/UL (ref 3.6–11)

## 2023-04-04 PROCEDURE — 80053 COMPREHEN METABOLIC PANEL: CPT

## 2023-04-04 PROCEDURE — 93005 ELECTROCARDIOGRAM TRACING: CPT

## 2023-04-04 PROCEDURE — 83880 ASSAY OF NATRIURETIC PEPTIDE: CPT

## 2023-04-04 PROCEDURE — 71045 X-RAY EXAM CHEST 1 VIEW: CPT

## 2023-04-04 PROCEDURE — 84484 ASSAY OF TROPONIN QUANT: CPT

## 2023-04-04 PROCEDURE — 73562 X-RAY EXAM OF KNEE 3: CPT

## 2023-04-04 PROCEDURE — 85025 COMPLETE CBC W/AUTO DIFF WBC: CPT

## 2023-04-04 PROCEDURE — 36415 COLL VENOUS BLD VENIPUNCTURE: CPT

## 2023-04-04 RX ORDER — DILTIAZEM HYDROCHLORIDE 120 MG/1: CAPSULE, EXTENDED RELEASE ORAL

## 2023-04-04 RX ORDER — GUAIFENESIN 600 MG/1
600 TABLET, EXTENDED RELEASE ORAL 2 TIMES DAILY
Qty: 14 TABLET | Refills: 0 | Status: SHIPPED
Start: 2023-04-04

## 2023-04-04 RX ORDER — BENZONATATE 100 MG/1
100 CAPSULE ORAL
Qty: 21 CAPSULE | Refills: 0 | Status: SHIPPED
Start: 2023-04-04 | End: 2023-04-11

## 2023-04-04 RX ORDER — ALBUTEROL SULFATE 90 UG/1
2 AEROSOL, METERED RESPIRATORY (INHALATION)
Qty: 18 G | Refills: 0 | Status: SHIPPED
Start: 2023-04-04

## 2023-04-04 NOTE — DISCHARGE INSTRUCTIONS
You were evaluated in the emergency department for shortness of breath and chest pain with cough. Your examination was reassuring as was your work-up including blood work, EKG, and chest xray. It will be important for you to follow-up with your primary care physician in 2-3 days. If you develop worsening symptoms such as worsening chest pain, shortness of breath, or fevers, please return to the emergency department immediately.

## 2023-04-04 NOTE — ED PROVIDER NOTES
[facility] EMERGENCY DEPT  EMERGENCY DEPARTMENT ENCOUNTER       Pt Name: Dimitri Sparrow  MRN: [id number]  [place] 1965  Date of evaluation: 4/3/2023  Provider: Giuseppe Arnold MD   PCP: Jerry Giron MD  Note Started: 1:44 AM 4/4/23     CHIEF COMPLAINT       Chief Complaint   Patient presents with    Chest Pain     Patient reports chest tightness and chest pain with coughing. Patient also reports an increased work of breathing. Patient states prior to arriving at the hospital tonight she fell in her driveway injuring her knee. Patient does acknowledge taking 81 asa daily. Patient denies any recent fevers or chills. Shortness of Breath        HISTORY OF PRESENT ILLNESS: 1 or more elements      History From: Patient, History limited by: No limitations     Dimitri Sparrow is a 60 y.o. female who presents with chief complaint of cough, chest pain, shortness of breath. Symptoms have been present over the past 1 to 2 days. Denies any fevers or other recent illness. Cough is productive with thick yellow sputum. She endorses associated shortness of breath, denies any symptoms with exertion. She does endorse chest pain located to midsternal chest worse with coughing. Denies any significant history of CAD, VTE. Nursing Notes were all reviewed and agreed with or any disagreements were addressed in the HPI. REVIEW OF SYSTEMS        Positives and Pertinent negatives as per HPI.     PAST HISTORY     Past Medical History:  Past Medical History:   Diagnosis Date    Acute bronchitis Jan 2015    Acute sinusitis     Anxiety     Arthritis     Back pain     Cocaine abuse with cocaine-induced disorder (HCC)     Delivery normal     x4    Dizziness     Gastrointestinal disorder     \"stomach ulcer\"    Other ill-defined conditions(959.89)     anemia    Palpitations     Peptic ulcer disease     Sciatica     TIA (transient ischemic attack)     Upper respiratory infection        Past Surgical History:  Past Surgical History: Procedure Laterality Date    HX GYN      tubal ligation    HX HEENT      sinus surgery    HX TONSILLECTOMY      HX TONSILLECTOMY      MA UNLISTED PROCEDURE ABDOMEN PERITONEUM & OMENTUM      ulcer       Family History:  Family History   Problem Relation Age of Onset    Stroke Mother     Heart Disease Mother        Social History:  Social History     Tobacco Use    Smoking status: Former     Packs/day: 0.50     Years: 15.00     Pack years: 7.50     Types: Cigarettes     Quit date: 2016     Years since quittin.0    Smokeless tobacco: Never   Substance Use Topics    Alcohol use: No    Drug use: No       Allergies: Allergies   Allergen Reactions    Penicillins Anaphylaxis     Has taken cephalexin without problem    Hydrocodone Itching       CURRENT MEDICATIONS      Discharge Medication List as of 2023  3:22 AM        CONTINUE these medications which have NOT CHANGED    Details   ranitidine HCl (ZANTAC PO) Take  by mouth., Historical Med      ALPRAZolam (XANAX) 1 mg tablet Take 1 mg by mouth three (3) times daily as needed for Anxiety. , Historical Med      dilTIAZem ER (Tiadylt ER) 120 mg capsule 1 cap(s), Historical Med             SCREENINGS               No data recorded         PHYSICAL EXAM      ED Triage Vitals [23 0001]   ED Encounter Vitals Group      BP (!) 141/81      Pulse (Heart Rate) 81      Resp Rate 20      Temp 98.4 °F (36.9 °C)      Temp src       O2 Sat (%) 100 %      Weight 266 lb 8.6 oz      Height 6'        Physical Exam  Vitals and nursing note reviewed. Constitutional:       General: She is not in acute distress. Appearance: She is well-developed. She is not ill-appearing. Cardiovascular:      Rate and Rhythm: Normal rate and regular rhythm. Heart sounds: No murmur heard. Pulmonary:      Effort: Pulmonary effort is normal. No respiratory distress. Breath sounds: Normal breath sounds. No wheezing.    Chest:      Chest wall: Tenderness (Reproducible midsternal chest wall TTP without crepitus, step-off, deformity) present. Musculoskeletal:      Right lower leg: No edema. Left lower leg: No edema. Neurological:      Mental Status: She is alert. DIAGNOSTIC RESULTS   LABS:     Recent Results (from the past 12 hour(s))   EKG, 12 LEAD, INITIAL    Collection Time: 04/04/23 12:11 AM   Result Value Ref Range    Ventricular Rate 80 BPM    Atrial Rate 80 BPM    P-R Interval 184 ms    QRS Duration 82 ms    Q-T Interval 394 ms    QTC Calculation (Bezet) 454 ms    Calculated P Axis 59 degrees    Calculated R Axis -11 degrees    Calculated T Axis 42 degrees    Diagnosis       Normal sinus rhythm  Low voltage QRS  Cannot rule out Anterior infarct (cited on or before 04-APR-2023)  When compared with ECG of 20-JAN-2021 12:52,  No significant change was found     CBC WITH AUTOMATED DIFF    Collection Time: 04/04/23 12:25 AM   Result Value Ref Range    WBC 7.8 3.6 - 11.0 K/uL    RBC 4.45 3.80 - 5.20 M/uL    HGB 12.4 11.5 - 16.0 g/dL    HCT 37.4 35.0 - 47.0 %    MCV 84.0 80.0 - 99.0 FL    MCH 27.9 26.0 - 34.0 PG    MCHC 33.2 30.0 - 36.5 g/dL    RDW 13.9 11.5 - 14.5 %    PLATELET 542 083 - 601 K/uL    MPV 9.8 8.9 - 12.9 FL    NRBC 0.0 0  WBC    ABSOLUTE NRBC 0.00 0.00 - 0.01 K/uL    NEUTROPHILS 41 32 - 75 %    LYMPHOCYTES 46 12 - 49 %    MONOCYTES 7 5 - 13 %    EOSINOPHILS 5 0 - 7 %    BASOPHILS 1 0 - 1 %    IMMATURE GRANULOCYTES 0 0.0 - 0.5 %    ABS. NEUTROPHILS 3.2 1.8 - 8.0 K/UL    ABS. LYMPHOCYTES 3.6 (H) 0.8 - 3.5 K/UL    ABS. MONOCYTES 0.5 0.0 - 1.0 K/UL    ABS. EOSINOPHILS 0.4 0.0 - 0.4 K/UL    ABS. BASOPHILS 0.1 0.0 - 0.1 K/UL    ABS. IMM.  GRANS. 0.0 0.00 - 0.04 K/UL    DF AUTOMATED     METABOLIC PANEL, COMPREHENSIVE    Collection Time: 04/04/23 12:25 AM   Result Value Ref Range    Sodium 138 136 - 145 mmol/L    Potassium 3.4 (L) 3.5 - 5.1 mmol/L    Chloride 106 97 - 108 mmol/L    CO2 26 21 - 32 mmol/L    Anion gap 6 5 - 15 mmol/L    Glucose 113 (H) 65 - 100 mg/dL    BUN 14 6 - 20 MG/DL    Creatinine 0.80 0.55 - 1.02 MG/DL    BUN/Creatinine ratio 18 12 - 20      eGFR >60 >60 ml/min/1.73m2    Calcium 8.9 8.5 - 10.1 MG/DL    Bilirubin, total 0.2 0.2 - 1.0 MG/DL    ALT (SGPT) 19 12 - 78 U/L    AST (SGOT) 10 (L) 15 - 37 U/L    Alk. phosphatase 103 45 - 117 U/L    Protein, total 7.8 6.4 - 8.2 g/dL    Albumin 3.3 (L) 3.5 - 5.0 g/dL    Globulin 4.5 (H) 2.0 - 4.0 g/dL    A-G Ratio 0.7 (L) 1.1 - 2.2     TROPONIN-HIGH SENSITIVITY    Collection Time: 04/04/23 12:25 AM   Result Value Ref Range    Troponin-High Sensitivity <4 0 - 51 ng/L   NT-PRO BNP    Collection Time: 04/04/23 12:25 AM   Result Value Ref Range    NT pro-BNP 24 <125 PG/ML        EKG: If performed, independent interpretation documented below in the MDM section     RADIOLOGY:  Non-plain film images such as CT, Ultrasound and MRI are read by the radiologist. Plain radiographic images are visualized and preliminarily interpreted by the ED Provider with the findings documented in the MDM section. Interpretation per the Radiologist below, if available at the time of this note:     XR CHEST PORT    Result Date: 4/4/2023  EXAM:  XR CHEST PORT INDICATION: Shortness of breath COMPARISON: 1/20/2021 TECHNIQUE: portable chest AP view obtained portably at 0039 hours FINDINGS: The cardiac silhouette is within normal limits. The pulmonary vasculature is within normal limits. The lungs and pleural spaces are clear. The visualized bones and upper abdomen are age-appropriate. No acute process on portable chest.     XR KNEE RT 3 V    Result Date: 4/4/2023  EXAM: XR KNEE RT 3 V INDICATION: Pain after Fall. COMPARISON: None. FINDINGS: Three views of the right knee demonstrate no fracture or other acute osseous or articular abnormality. There is tricompartmental osteoarthritis. There is a small joint effusion. .     Tricompartmental right knee osteoarthritis with small joint effusion       PROCEDURES   Unless otherwise noted below, none  Procedures     CRITICAL CARE TIME   0    EMERGENCY DEPARTMENT COURSE and DIFFERENTIAL DIAGNOSIS/MDM   Vitals:    Vitals:    04/04/23 0001   BP: (!) 141/81   Pulse: 81   Resp: 20   Temp: 98.4 °F (36.9 °C)   SpO2: 100%   Weight: 120.9 kg (266 lb 8.6 oz)   Height: 6' (1.829 m)        Patient was given the following medications:  Medications - No data to display    Medical Decision Making  Amount and/or Complexity of Data Reviewed  Labs: ordered. Decision-making details documented in ED Course. Radiology: ordered and independent interpretation performed. Decision-making details documented in ED Course. ECG/medicine tests: ordered and independent interpretation performed. Decision-making details documented in ED Course. Risk  OTC drugs. Prescription drug management. Decision regarding hospitalization. Healthy 50-year-old female presenting to the emergency department with chest pain, shortness of breath, cough with thick yellow sputum. She is afebrile and vital signs are stable. She appears clinically well and nontoxic. She has no increased work of breathing or hypoxia. She does have reproducible chest wall TTP without crepitus, step-off, deformity. Differential diagnosis includes viral URI, reactive airway disease, pneumothorax, costochondritis, pleurisy, pneumonia, consider ACS but this appears less likely, PE considered be much less likely. I will evaluate with cardiac enzymes, blood work, chest x-ray, EKG, cardiac monitoring, and reassess. Troponin negative and symptoms have been present for greater than 4 hours, no need for repeat. She is low risk for ACS by heart score. Considered admission but she is low risk for ACS by heart score and symptoms appear most consistent with viral URI. Symptoms are not consistent with PE today. Plain films negative for acute process. Symptoms likely consistent with viral URI with cough with associated costochondritis.   I will treat with albuterol inhaler, Tessalon, guaifenesin. Encourage close follow-up with PCP and given strict return ED precautions. All questions answered and she agrees with plan as above. ED Course as of 04/04/23 0434   Tue Apr 04, 2023   0143 EKG per my interpretation normal sinus rhythm, rate 80 bpm, normal axis, no acute ischemic changes or interval changes. [AK]   [id number] Chest x-ray per my independent interpretation no acute process such as acute infiltrate or pneumothorax, confirmed by radiologist.   Luberta Mohs      ED Course User Index  [AK] Ana Cristina Lee MD         Cardiac Monitoring: The cardiac monitor revealed the following rhythm as interpreted by me: Normal Sinus Rhythm, rate 80 bpm  The cardiac monitor was ordered secondary to the patient's reported complaint of chest pain and shortness of breath and to monitor the patient for dysrhythmia. Chapito Pearson MD      FINAL IMPRESSION     1. Viral URI with cough    2. Costochondritis    3. SOB (shortness of breath)          DISPOSITION/PLAN     Discharge Note:  The patient has been re-evaluated and is ready for discharge. Reviewed available results with patient. Counseled patient on diagnosis and care plan. Patient has expressed understanding, and all questions have been answered. Patient agrees with plan and agrees to follow up as recommended, or to return to the ED if their symptoms worsen. Discharge instructions have been provided and explained to the patient, along with reasons to return to the ED. CLINICAL IMPRESSION    1. Viral URI with cough    2. Costochondritis    3.  SOB (shortness of breath)         DISPOSITION  Discharge     PATIENT REFERRED TO:  Follow-up Information       Follow up With Specialties Details Why Contact Info    Ramandeep Ivey MD Family Medicine Schedule an appointment as soon as possible for a visit   [address]  [phone]      [facility] EMERGENCY DEPT Emergency Medicine Go to  As needed, If symptoms worsen [address] [facility]  [phone]              DISCHARGE MEDICATIONS:  Discharge Medication List as of 4/4/2023  3:22 AM        START taking these medications    Details   albuterol (PROVENTIL HFA, VENTOLIN HFA, PROAIR HFA) 90 mcg/actuation inhaler Take 2 Puffs by inhalation every four (4) hours as needed for Wheezing., Normal, Disp-18 g, R-0      benzonatate (Tessalon Perles) 100 mg capsule Take 1 Capsule by mouth three (3) times daily as needed for Cough for up to 7 days. , Normal, Disp-21 Capsule, R-0      guaiFENesin ER (MUCINEX) 600 mg ER tablet Take 1 Tablet by mouth two (2) times a day., Normal, Disp-14 Tablet, R-0           CONTINUE these medications which have NOT CHANGED    Details   ranitidine HCl (ZANTAC PO) Take  by mouth., Historical Med      ALPRAZolam (XANAX) 1 mg tablet Take 1 mg by mouth three (3) times daily as needed for Anxiety. , Historical Med      dilTIAZem ER (Tiadylt ER) 120 mg capsule 1 cap(s), Historical Med               DISCONTINUED MEDICATIONS:  Discharge Medication List as of 4/4/2023  3:22 AM          I am the Primary Clinician of Record. Savage Sexton MD (electronically signed)    (Please note that parts of this dictation were completed with voice recognition software. Quite often unanticipated grammatical, syntax, homophones, and other interpretive errors are inadvertently transcribed by the computer software. Please disregards these errors.  Please excuse any errors that have escaped final proofreading.)

## 2023-04-21 ENCOUNTER — APPOINTMENT (OUTPATIENT)
Dept: CT IMAGING | Age: 58
DRG: 948 | End: 2023-04-21
Attending: STUDENT IN AN ORGANIZED HEALTH CARE EDUCATION/TRAINING PROGRAM
Payer: MEDICARE

## 2023-04-21 ENCOUNTER — APPOINTMENT (OUTPATIENT)
Dept: GENERAL RADIOLOGY | Age: 58
DRG: 948 | End: 2023-04-21
Attending: STUDENT IN AN ORGANIZED HEALTH CARE EDUCATION/TRAINING PROGRAM
Payer: MEDICARE

## 2023-04-21 ENCOUNTER — HOSPITAL ENCOUNTER (INPATIENT)
Age: 58
LOS: 5 days | Discharge: NURSING, REHAB OR LONG-TERM CARE | DRG: 948 | End: 2023-04-26
Attending: STUDENT IN AN ORGANIZED HEALTH CARE EDUCATION/TRAINING PROGRAM | Admitting: INTERNAL MEDICINE
Payer: MEDICARE

## 2023-04-21 DIAGNOSIS — I63.9 ACUTE STROKE DUE TO ISCHEMIA (HCC): ICD-10-CM

## 2023-04-21 DIAGNOSIS — R20.0 LEFT SIDED NUMBNESS: ICD-10-CM

## 2023-04-21 DIAGNOSIS — I63.9 CEREBROVASCULAR ACCIDENT (CVA), UNSPECIFIED MECHANISM (HCC): Primary | ICD-10-CM

## 2023-04-21 DIAGNOSIS — F41.9 ANXIETY: ICD-10-CM

## 2023-04-21 DIAGNOSIS — R53.1 WEAKNESS: ICD-10-CM

## 2023-04-21 DIAGNOSIS — R26.9 ABNORMALITY OF GAIT AND MOBILITY: ICD-10-CM

## 2023-04-21 DIAGNOSIS — F14.10 COCAINE ABUSE (HCC): ICD-10-CM

## 2023-04-21 DIAGNOSIS — G45.1 TRANSIENT ISCHEMIC ATTACK INVOLVING RIGHT INTERNAL CAROTID ARTERY: ICD-10-CM

## 2023-04-21 DIAGNOSIS — I67.1 CEREBRAL ANEURYSM, NONRUPTURED: ICD-10-CM

## 2023-04-21 DIAGNOSIS — I65.23 BILATERAL CAROTID ARTERY STENOSIS: ICD-10-CM

## 2023-04-21 LAB
ALBUMIN SERPL-MCNC: 3.5 G/DL (ref 3.5–5)
ALBUMIN/GLOB SERPL: 0.8 (ref 1.1–2.2)
ALP SERPL-CCNC: 106 U/L (ref 45–117)
ALT SERPL-CCNC: 22 U/L (ref 12–78)
ANION GAP SERPL CALC-SCNC: 4 MMOL/L (ref 5–15)
AST SERPL-CCNC: 14 U/L (ref 15–37)
BASOPHILS # BLD: 0.1 K/UL (ref 0–0.1)
BASOPHILS NFR BLD: 1 % (ref 0–1)
BILIRUB SERPL-MCNC: 0.5 MG/DL (ref 0.2–1)
BUN SERPL-MCNC: 13 MG/DL (ref 6–20)
BUN/CREAT SERPL: 17 (ref 12–20)
CALCIUM SERPL-MCNC: 9.1 MG/DL (ref 8.5–10.1)
CHLORIDE SERPL-SCNC: 107 MMOL/L (ref 97–108)
CO2 SERPL-SCNC: 28 MMOL/L (ref 21–32)
COMMENT, HOLDF: NORMAL
CREAT SERPL-MCNC: 0.78 MG/DL (ref 0.55–1.02)
DIFFERENTIAL METHOD BLD: NORMAL
EOSINOPHIL # BLD: 0.3 K/UL (ref 0–0.4)
EOSINOPHIL NFR BLD: 5 % (ref 0–7)
ERYTHROCYTE [DISTWIDTH] IN BLOOD BY AUTOMATED COUNT: 13.8 % (ref 11.5–14.5)
GLOBULIN SER CALC-MCNC: 4.6 G/DL (ref 2–4)
GLUCOSE SERPL-MCNC: 103 MG/DL (ref 65–100)
HCT VFR BLD AUTO: 37.6 % (ref 35–47)
HGB BLD-MCNC: 12.1 G/DL (ref 11.5–16)
IMM GRANULOCYTES # BLD AUTO: 0 K/UL (ref 0–0.04)
IMM GRANULOCYTES NFR BLD AUTO: 0 % (ref 0–0.5)
LYMPHOCYTES # BLD: 2.5 K/UL (ref 0.8–3.5)
LYMPHOCYTES NFR BLD: 48 % (ref 12–49)
MCH RBC QN AUTO: 27.3 PG (ref 26–34)
MCHC RBC AUTO-ENTMCNC: 32.2 G/DL (ref 30–36.5)
MCV RBC AUTO: 84.9 FL (ref 80–99)
MONOCYTES # BLD: 0.4 K/UL (ref 0–1)
MONOCYTES NFR BLD: 8 % (ref 5–13)
NEUTS SEG # BLD: 2 K/UL (ref 1.8–8)
NEUTS SEG NFR BLD: 38 % (ref 32–75)
NRBC # BLD: 0 K/UL (ref 0–0.01)
NRBC BLD-RTO: 0 PER 100 WBC
PLATELET # BLD AUTO: 304 K/UL (ref 150–400)
PMV BLD AUTO: 8.9 FL (ref 8.9–12.9)
POTASSIUM SERPL-SCNC: 3.7 MMOL/L (ref 3.5–5.1)
PROT SERPL-MCNC: 8.1 G/DL (ref 6.4–8.2)
RBC # BLD AUTO: 4.43 M/UL (ref 3.8–5.2)
SAMPLES BEING HELD,HOLD: NORMAL
SODIUM SERPL-SCNC: 139 MMOL/L (ref 136–145)
WBC # BLD AUTO: 5.2 K/UL (ref 3.6–11)

## 2023-04-21 PROCEDURE — 74011250637 HC RX REV CODE- 250/637: Performed by: INTERNAL MEDICINE

## 2023-04-21 PROCEDURE — 99285 EMERGENCY DEPT VISIT HI MDM: CPT

## 2023-04-21 PROCEDURE — 74011000636 HC RX REV CODE- 636: Performed by: STUDENT IN AN ORGANIZED HEALTH CARE EDUCATION/TRAINING PROGRAM

## 2023-04-21 PROCEDURE — 65270000046 HC RM TELEMETRY

## 2023-04-21 PROCEDURE — 70496 CT ANGIOGRAPHY HEAD: CPT

## 2023-04-21 PROCEDURE — 36415 COLL VENOUS BLD VENIPUNCTURE: CPT

## 2023-04-21 PROCEDURE — 71045 X-RAY EXAM CHEST 1 VIEW: CPT

## 2023-04-21 PROCEDURE — 85025 COMPLETE CBC W/AUTO DIFF WBC: CPT

## 2023-04-21 PROCEDURE — 70450 CT HEAD/BRAIN W/O DYE: CPT

## 2023-04-21 PROCEDURE — 74011250636 HC RX REV CODE- 250/636: Performed by: INTERNAL MEDICINE

## 2023-04-21 PROCEDURE — 93005 ELECTROCARDIOGRAM TRACING: CPT

## 2023-04-21 PROCEDURE — 4A03X5D MEASUREMENT OF ARTERIAL FLOW, INTRACRANIAL, EXTERNAL APPROACH: ICD-10-PCS | Performed by: INTERNAL MEDICINE

## 2023-04-21 PROCEDURE — 0042T CT CODE NEURO PERF W CBF: CPT

## 2023-04-21 PROCEDURE — 74011250637 HC RX REV CODE- 250/637

## 2023-04-21 PROCEDURE — 80053 COMPREHEN METABOLIC PANEL: CPT

## 2023-04-21 RX ORDER — DILTIAZEM HYDROCHLORIDE 120 MG/1
120 CAPSULE, EXTENDED RELEASE ORAL DAILY
Status: DISCONTINUED | OUTPATIENT
Start: 2023-04-22 | End: 2023-04-21

## 2023-04-21 RX ORDER — ACETAMINOPHEN 325 MG/1
650 TABLET ORAL
Status: DISCONTINUED | OUTPATIENT
Start: 2023-04-21 | End: 2023-04-26 | Stop reason: HOSPADM

## 2023-04-21 RX ORDER — FAMOTIDINE 20 MG/1
20 TABLET, FILM COATED ORAL 2 TIMES DAILY
Status: DISCONTINUED | OUTPATIENT
Start: 2023-04-21 | End: 2023-04-26 | Stop reason: HOSPADM

## 2023-04-21 RX ORDER — ATORVASTATIN CALCIUM 40 MG/1
40 TABLET, FILM COATED ORAL
Status: DISCONTINUED | OUTPATIENT
Start: 2023-04-21 | End: 2023-04-26 | Stop reason: HOSPADM

## 2023-04-21 RX ORDER — ALPRAZOLAM 0.5 MG/1
1 TABLET ORAL
Status: DISCONTINUED | OUTPATIENT
Start: 2023-04-21 | End: 2023-04-26 | Stop reason: HOSPADM

## 2023-04-21 RX ORDER — DILTIAZEM HYDROCHLORIDE 120 MG/1
120 CAPSULE, COATED, EXTENDED RELEASE ORAL DAILY
Status: DISCONTINUED | OUTPATIENT
Start: 2023-04-22 | End: 2023-04-26 | Stop reason: HOSPADM

## 2023-04-21 RX ORDER — OXYMETAZOLINE HCL 0.05 %
2 SPRAY, NON-AEROSOL (ML) NASAL
Status: DISCONTINUED | OUTPATIENT
Start: 2023-04-21 | End: 2023-04-26 | Stop reason: HOSPADM

## 2023-04-21 RX ORDER — ALBUTEROL SULFATE 90 UG/1
2 AEROSOL, METERED RESPIRATORY (INHALATION)
Status: DISCONTINUED | OUTPATIENT
Start: 2023-04-21 | End: 2023-04-26 | Stop reason: HOSPADM

## 2023-04-21 RX ORDER — GUAIFENESIN 100 MG/5ML
81 LIQUID (ML) ORAL DAILY
Status: DISCONTINUED | OUTPATIENT
Start: 2023-04-22 | End: 2023-04-26 | Stop reason: HOSPADM

## 2023-04-21 RX ORDER — ACETAMINOPHEN 650 MG/1
650 SUPPOSITORY RECTAL
Status: DISCONTINUED | OUTPATIENT
Start: 2023-04-21 | End: 2023-04-26 | Stop reason: HOSPADM

## 2023-04-21 RX ORDER — ACETAMINOPHEN 325 MG/1
975 TABLET ORAL ONCE
Status: COMPLETED | OUTPATIENT
Start: 2023-04-21 | End: 2023-04-21

## 2023-04-21 RX ORDER — BUTALBITAL, ACETAMINOPHEN AND CAFFEINE 50; 325; 40 MG/1; MG/1; MG/1
1 TABLET ORAL ONCE
Status: COMPLETED | OUTPATIENT
Start: 2023-04-21 | End: 2023-04-21

## 2023-04-21 RX ORDER — ENOXAPARIN SODIUM 100 MG/ML
30 INJECTION SUBCUTANEOUS EVERY 12 HOURS
Status: DISCONTINUED | OUTPATIENT
Start: 2023-04-21 | End: 2023-04-26 | Stop reason: HOSPADM

## 2023-04-21 RX ORDER — TRAMADOL HYDROCHLORIDE 50 MG/1
50 TABLET ORAL
Status: DISCONTINUED | OUTPATIENT
Start: 2023-04-21 | End: 2023-04-26 | Stop reason: HOSPADM

## 2023-04-21 RX ADMIN — FAMOTIDINE 20 MG: 20 TABLET, FILM COATED ORAL at 20:15

## 2023-04-21 RX ADMIN — ACETAMINOPHEN 325MG 975 MG: 325 TABLET ORAL at 20:15

## 2023-04-21 RX ADMIN — IOPAMIDOL 100 ML: 755 INJECTION, SOLUTION INTRAVENOUS at 14:50

## 2023-04-21 RX ADMIN — ATORVASTATIN CALCIUM 40 MG: 40 TABLET, FILM COATED ORAL at 22:00

## 2023-04-21 RX ADMIN — BUTALBITAL, ACETAMINOPHEN, AND CAFFEINE 1 TABLET: 50; 325; 40 TABLET ORAL at 22:12

## 2023-04-21 RX ADMIN — ENOXAPARIN SODIUM 30 MG: 100 INJECTION SUBCUTANEOUS at 20:16

## 2023-04-22 ENCOUNTER — APPOINTMENT (OUTPATIENT)
Dept: MRI IMAGING | Age: 58
DRG: 948 | End: 2023-04-22
Attending: INTERNAL MEDICINE
Payer: MEDICARE

## 2023-04-22 PROBLEM — G45.1 TRANSIENT ISCHEMIC ATTACK INVOLVING RIGHT INTERNAL CAROTID ARTERY: Status: ACTIVE | Noted: 2023-04-22

## 2023-04-22 PROBLEM — I65.23 BILATERAL CAROTID ARTERY STENOSIS: Status: ACTIVE | Noted: 2023-04-22

## 2023-04-22 PROBLEM — I67.1 CEREBRAL ANEURYSM, NONRUPTURED: Status: ACTIVE | Noted: 2023-04-22

## 2023-04-22 PROBLEM — R20.0 LEFT SIDED NUMBNESS: Status: ACTIVE | Noted: 2023-04-22

## 2023-04-22 LAB
ANION GAP SERPL CALC-SCNC: 1 MMOL/L (ref 5–15)
ATRIAL RATE: 66 BPM
BUN SERPL-MCNC: 11 MG/DL (ref 6–20)
BUN/CREAT SERPL: 15 (ref 12–20)
CALCIUM SERPL-MCNC: 9.1 MG/DL (ref 8.5–10.1)
CALCULATED P AXIS, ECG09: 41 DEGREES
CALCULATED R AXIS, ECG10: -18 DEGREES
CALCULATED T AXIS, ECG11: 5 DEGREES
CHLORIDE SERPL-SCNC: 111 MMOL/L (ref 97–108)
CHOLEST SERPL-MCNC: 175 MG/DL
CO2 SERPL-SCNC: 27 MMOL/L (ref 21–32)
CREAT SERPL-MCNC: 0.75 MG/DL (ref 0.55–1.02)
DIAGNOSIS, 93000: NORMAL
ERYTHROCYTE [DISTWIDTH] IN BLOOD BY AUTOMATED COUNT: 13.6 % (ref 11.5–14.5)
ERYTHROCYTE [SEDIMENTATION RATE] IN BLOOD: 29 MM/HR (ref 0–30)
GLUCOSE SERPL-MCNC: 101 MG/DL (ref 65–100)
HCT VFR BLD AUTO: 36.5 % (ref 35–47)
HDLC SERPL-MCNC: 63 MG/DL
HDLC SERPL: 2.8 (ref 0–5)
HGB BLD-MCNC: 12 G/DL (ref 11.5–16)
LDLC SERPL CALC-MCNC: 100.4 MG/DL (ref 0–100)
MCH RBC QN AUTO: 27.3 PG (ref 26–34)
MCHC RBC AUTO-ENTMCNC: 32.9 G/DL (ref 30–36.5)
MCV RBC AUTO: 83 FL (ref 80–99)
NRBC # BLD: 0 K/UL (ref 0–0.01)
NRBC BLD-RTO: 0 PER 100 WBC
P-R INTERVAL, ECG05: 178 MS
PLATELET # BLD AUTO: 303 K/UL (ref 150–400)
PMV BLD AUTO: 9.1 FL (ref 8.9–12.9)
POTASSIUM SERPL-SCNC: 3.6 MMOL/L (ref 3.5–5.1)
Q-T INTERVAL, ECG07: 416 MS
QRS DURATION, ECG06: 88 MS
QTC CALCULATION (BEZET), ECG08: 436 MS
RBC # BLD AUTO: 4.4 M/UL (ref 3.8–5.2)
SODIUM SERPL-SCNC: 139 MMOL/L (ref 136–145)
TRIGL SERPL-MCNC: 58 MG/DL (ref ?–150)
TSH SERPL DL<=0.05 MIU/L-ACNC: 1.93 UIU/ML (ref 0.36–3.74)
VENTRICULAR RATE, ECG03: 66 BPM
VLDLC SERPL CALC-MCNC: 11.6 MG/DL
WBC # BLD AUTO: 4.9 K/UL (ref 3.6–11)

## 2023-04-22 PROCEDURE — 92610 EVALUATE SWALLOWING FUNCTION: CPT

## 2023-04-22 PROCEDURE — 70551 MRI BRAIN STEM W/O DYE: CPT

## 2023-04-22 PROCEDURE — 36415 COLL VENOUS BLD VENIPUNCTURE: CPT

## 2023-04-22 PROCEDURE — 74011250637 HC RX REV CODE- 250/637: Performed by: INTERNAL MEDICINE

## 2023-04-22 PROCEDURE — 51798 US URINE CAPACITY MEASURE: CPT

## 2023-04-22 PROCEDURE — 74011250636 HC RX REV CODE- 250/636: Performed by: INTERNAL MEDICINE

## 2023-04-22 PROCEDURE — 65270000046 HC RM TELEMETRY

## 2023-04-22 PROCEDURE — 80061 LIPID PANEL: CPT

## 2023-04-22 PROCEDURE — 97165 OT EVAL LOW COMPLEX 30 MIN: CPT

## 2023-04-22 PROCEDURE — 99223 1ST HOSP IP/OBS HIGH 75: CPT | Performed by: PSYCHIATRY & NEUROLOGY

## 2023-04-22 PROCEDURE — 92522 EVALUATE SPEECH PRODUCTION: CPT

## 2023-04-22 PROCEDURE — 84443 ASSAY THYROID STIM HORMONE: CPT

## 2023-04-22 PROCEDURE — 85652 RBC SED RATE AUTOMATED: CPT

## 2023-04-22 PROCEDURE — 83036 HEMOGLOBIN GLYCOSYLATED A1C: CPT

## 2023-04-22 PROCEDURE — 97162 PT EVAL MOD COMPLEX 30 MIN: CPT | Performed by: PHYSICAL THERAPIST

## 2023-04-22 PROCEDURE — 85027 COMPLETE CBC AUTOMATED: CPT

## 2023-04-22 PROCEDURE — 97530 THERAPEUTIC ACTIVITIES: CPT | Performed by: PHYSICAL THERAPIST

## 2023-04-22 PROCEDURE — 80048 BASIC METABOLIC PNL TOTAL CA: CPT

## 2023-04-22 RX ORDER — LORAZEPAM 2 MG/ML
2 INJECTION INTRAMUSCULAR ONCE
Status: COMPLETED | OUTPATIENT
Start: 2023-04-22 | End: 2023-04-22

## 2023-04-22 RX ORDER — BUTALBITAL, ACETAMINOPHEN AND CAFFEINE 50; 325; 40 MG/1; MG/1; MG/1
1 TABLET ORAL
Status: DISCONTINUED | OUTPATIENT
Start: 2023-04-22 | End: 2023-04-26 | Stop reason: HOSPADM

## 2023-04-22 RX ADMIN — ENOXAPARIN SODIUM 30 MG: 100 INJECTION SUBCUTANEOUS at 06:31

## 2023-04-22 RX ADMIN — FAMOTIDINE 20 MG: 20 TABLET, FILM COATED ORAL at 08:25

## 2023-04-22 RX ADMIN — BUTALBITAL, ACETAMINOPHEN, AND CAFFEINE 1 TABLET: 50; 325; 40 TABLET ORAL at 21:08

## 2023-04-22 RX ADMIN — ENOXAPARIN SODIUM 30 MG: 100 INJECTION SUBCUTANEOUS at 18:21

## 2023-04-22 RX ADMIN — LORAZEPAM 2 MG: 2 INJECTION INTRAMUSCULAR; INTRAVENOUS at 14:55

## 2023-04-22 RX ADMIN — DILTIAZEM HYDROCHLORIDE 120 MG: 120 CAPSULE, COATED, EXTENDED RELEASE ORAL at 08:25

## 2023-04-22 RX ADMIN — ATORVASTATIN CALCIUM 40 MG: 40 TABLET, FILM COATED ORAL at 21:08

## 2023-04-22 RX ADMIN — BUTALBITAL, ACETAMINOPHEN, AND CAFFEINE 1 TABLET: 50; 325; 40 TABLET ORAL at 09:02

## 2023-04-22 RX ADMIN — FAMOTIDINE 20 MG: 20 TABLET, FILM COATED ORAL at 18:21

## 2023-04-22 RX ADMIN — ASPIRIN 81 MG: 81 TABLET, CHEWABLE ORAL at 08:25

## 2023-04-23 LAB
AMPHET UR QL SCN: NEGATIVE
APPEARANCE UR: CLEAR
BACTERIA URNS QL MICRO: ABNORMAL /HPF
BARBITURATES UR QL SCN: POSITIVE
BENZODIAZ UR QL: POSITIVE
BILIRUB UR QL: NEGATIVE
CANNABINOIDS UR QL SCN: NEGATIVE
COCAINE UR QL SCN: NEGATIVE
COLOR UR: ABNORMAL
DRUG SCRN COMMENT,DRGCM: ABNORMAL
EPITH CASTS URNS QL MICRO: ABNORMAL /LPF
EST. AVERAGE GLUCOSE BLD GHB EST-MCNC: 108 MG/DL
GLUCOSE UR STRIP.AUTO-MCNC: NEGATIVE MG/DL
HBA1C MFR BLD: 5.4 % (ref 4–5.6)
HGB UR QL STRIP: ABNORMAL
KETONES UR QL STRIP.AUTO: NEGATIVE MG/DL
LEUKOCYTE ESTERASE UR QL STRIP.AUTO: ABNORMAL
METHADONE UR QL: NEGATIVE
NITRITE UR QL STRIP.AUTO: NEGATIVE
OPIATES UR QL: NEGATIVE
PCP UR QL: NEGATIVE
PH UR STRIP: 7 (ref 5–8)
PROT UR STRIP-MCNC: NEGATIVE MG/DL
RBC #/AREA URNS HPF: ABNORMAL /HPF (ref 0–5)
SP GR UR REFRACTOMETRY: 1.01
UA: UC IF INDICATED,UAUC: ABNORMAL
UROBILINOGEN UR QL STRIP.AUTO: 1 EU/DL (ref 0.2–1)
WBC URNS QL MICRO: ABNORMAL /HPF (ref 0–4)

## 2023-04-23 PROCEDURE — 74011250636 HC RX REV CODE- 250/636: Performed by: INTERNAL MEDICINE

## 2023-04-23 PROCEDURE — 81001 URINALYSIS AUTO W/SCOPE: CPT

## 2023-04-23 PROCEDURE — 80307 DRUG TEST PRSMV CHEM ANLYZR: CPT

## 2023-04-23 PROCEDURE — 99232 SBSQ HOSP IP/OBS MODERATE 35: CPT | Performed by: PSYCHIATRY & NEUROLOGY

## 2023-04-23 PROCEDURE — 74011250637 HC RX REV CODE- 250/637: Performed by: INTERNAL MEDICINE

## 2023-04-23 PROCEDURE — 65270000046 HC RM TELEMETRY

## 2023-04-23 RX ORDER — TAMSULOSIN HYDROCHLORIDE 0.4 MG/1
0.4 CAPSULE ORAL DAILY
Status: DISCONTINUED | OUTPATIENT
Start: 2023-04-23 | End: 2023-04-26 | Stop reason: HOSPADM

## 2023-04-23 RX ORDER — LEVOFLOXACIN 5 MG/ML
750 INJECTION, SOLUTION INTRAVENOUS EVERY 24 HOURS
Status: COMPLETED | OUTPATIENT
Start: 2023-04-23 | End: 2023-04-26

## 2023-04-23 RX ADMIN — DILTIAZEM HYDROCHLORIDE 120 MG: 120 CAPSULE, COATED, EXTENDED RELEASE ORAL at 09:03

## 2023-04-23 RX ADMIN — TAMSULOSIN HYDROCHLORIDE 0.4 MG: 0.4 CAPSULE ORAL at 14:23

## 2023-04-23 RX ADMIN — ENOXAPARIN SODIUM 30 MG: 100 INJECTION SUBCUTANEOUS at 17:10

## 2023-04-23 RX ADMIN — ENOXAPARIN SODIUM 30 MG: 100 INJECTION SUBCUTANEOUS at 05:21

## 2023-04-23 RX ADMIN — BUTALBITAL, ACETAMINOPHEN, AND CAFFEINE 1 TABLET: 50; 325; 40 TABLET ORAL at 11:29

## 2023-04-23 RX ADMIN — FAMOTIDINE 20 MG: 20 TABLET, FILM COATED ORAL at 17:11

## 2023-04-23 RX ADMIN — ATORVASTATIN CALCIUM 40 MG: 40 TABLET, FILM COATED ORAL at 22:52

## 2023-04-23 RX ADMIN — FAMOTIDINE 20 MG: 20 TABLET, FILM COATED ORAL at 09:03

## 2023-04-23 RX ADMIN — ALPRAZOLAM 1 MG: 0.5 TABLET ORAL at 22:52

## 2023-04-23 RX ADMIN — ASPIRIN 81 MG: 81 TABLET, CHEWABLE ORAL at 09:03

## 2023-04-23 RX ADMIN — LEVOFLOXACIN 750 MG: 5 INJECTION, SOLUTION INTRAVENOUS at 14:23

## 2023-04-24 ENCOUNTER — APPOINTMENT (OUTPATIENT)
Dept: VASCULAR SURGERY | Age: 58
DRG: 948 | End: 2023-04-24
Attending: PSYCHIATRY & NEUROLOGY
Payer: MEDICARE

## 2023-04-24 ENCOUNTER — APPOINTMENT (OUTPATIENT)
Dept: NON INVASIVE DIAGNOSTICS | Age: 58
DRG: 948 | End: 2023-04-24
Attending: INTERNAL MEDICINE
Payer: MEDICARE

## 2023-04-24 DIAGNOSIS — I67.1 CEREBRAL ANEURYSM, NONRUPTURED: Primary | ICD-10-CM

## 2023-04-24 LAB
ECHO AO ASC DIAM: 2.4 CM
ECHO AO ASCENDING AORTA INDEX: 1 CM/M2
ECHO AO ROOT DIAM: 3.5 CM
ECHO AO ROOT INDEX: 1.46 CM/M2
ECHO LA DIAMETER INDEX: 1.42 CM/M2
ECHO LA DIAMETER: 3.4 CM
ECHO LA TO AORTIC ROOT RATIO: 0.97
ECHO LV FRACTIONAL SHORTENING: 20 % (ref 28–44)
ECHO LV INTERNAL DIMENSION DIASTOLE INDEX: 1.92 CM/M2
ECHO LV INTERNAL DIMENSION DIASTOLIC: 4.6 CM (ref 3.9–5.3)
ECHO LV INTERNAL DIMENSION SYSTOLIC INDEX: 1.54 CM/M2
ECHO LV INTERNAL DIMENSION SYSTOLIC: 3.7 CM
ECHO LV IVSD: 1.2 CM (ref 0.6–0.9)
ECHO LV MASS 2D: 158.8 G (ref 67–162)
ECHO LV MASS INDEX 2D: 66.2 G/M2 (ref 43–95)
ECHO LV POSTERIOR WALL DIASTOLIC: 0.8 CM (ref 0.6–0.9)
ECHO LV RELATIVE WALL THICKNESS RATIO: 0.35
LEFT CCA DIST DIAS: 16.8 CM/S
LEFT CCA DIST SYS: 79.4 CM/S
LEFT CCA PROX DIAS: 16.8 CM/S
LEFT CCA PROX SYS: 83.3 CM/S
LEFT ECA DIAS: 0 CM/S
LEFT ECA SYS: 33.8 CM/S
LEFT ICA DIST DIAS: 17.8 CM/S
LEFT ICA DIST SYS: 49 CM/S
LEFT ICA MID DIAS: 11.9 CM/S
LEFT ICA MID SYS: 43 CM/S
LEFT ICA PROX DIAS: 14.2 CM/S
LEFT ICA PROX SYS: 54.6 CM/S
LEFT ICA/CCA SYS: 0.7 NO UNITS
LEFT VERTEBRAL DIAS: 15.5 CM/S
LEFT VERTEBRAL SYS: 46.8 CM/S
RIGHT CCA DIST DIAS: 14.2 CM/S
RIGHT CCA DIST SYS: 71.6 CM/S
RIGHT CCA PROX DIAS: 12.9 CM/S
RIGHT CCA PROX SYS: 75.5 CM/S
RIGHT ECA DIAS: 7.7 CM/S
RIGHT ECA SYS: 104.2 CM/S
RIGHT ICA DIST DIAS: 18.2 CM/S
RIGHT ICA DIST SYS: 51.6 CM/S
RIGHT ICA MID DIAS: 19.4 CM/S
RIGHT ICA MID SYS: 37.7 CM/S
RIGHT ICA PROX DIAS: 12.9 CM/S
RIGHT ICA PROX SYS: 27.3 CM/S
RIGHT ICA/CCA SYS: 0.7 NO UNITS
RIGHT VERTEBRAL DIAS: 10.3 CM/S
RIGHT VERTEBRAL SYS: 39 CM/S
VAS LEFT SUBCLAVIAN PROX EDV: 0 CM/S
VAS LEFT SUBCLAVIAN PROX PSV: 101.6 CM/S
VAS RIGHT SUBCLAVIAN PROX EDV: 0 CM/S
VAS RIGHT SUBCLAVIAN PROX PSV: 92.5 CM/S

## 2023-04-24 PROCEDURE — 97535 SELF CARE MNGMENT TRAINING: CPT

## 2023-04-24 PROCEDURE — 74011250637 HC RX REV CODE- 250/637: Performed by: INTERNAL MEDICINE

## 2023-04-24 PROCEDURE — 65270000046 HC RM TELEMETRY

## 2023-04-24 PROCEDURE — 99232 SBSQ HOSP IP/OBS MODERATE 35: CPT | Performed by: PSYCHIATRY & NEUROLOGY

## 2023-04-24 PROCEDURE — 93880 EXTRACRANIAL BILAT STUDY: CPT

## 2023-04-24 PROCEDURE — 74011250636 HC RX REV CODE- 250/636: Performed by: INTERNAL MEDICINE

## 2023-04-24 PROCEDURE — 93308 TTE F-UP OR LMTD: CPT

## 2023-04-24 PROCEDURE — 92526 ORAL FUNCTION THERAPY: CPT

## 2023-04-24 PROCEDURE — 97530 THERAPEUTIC ACTIVITIES: CPT

## 2023-04-24 PROCEDURE — 93880 EXTRACRANIAL BILAT STUDY: CPT | Performed by: PSYCHIATRY & NEUROLOGY

## 2023-04-24 PROCEDURE — 97116 GAIT TRAINING THERAPY: CPT

## 2023-04-24 PROCEDURE — 92507 TX SP LANG VOICE COMM INDIV: CPT

## 2023-04-24 RX ADMIN — FAMOTIDINE 20 MG: 20 TABLET, FILM COATED ORAL at 08:20

## 2023-04-24 RX ADMIN — ASPIRIN 81 MG: 81 TABLET, CHEWABLE ORAL at 08:20

## 2023-04-24 RX ADMIN — ENOXAPARIN SODIUM 30 MG: 100 INJECTION SUBCUTANEOUS at 05:50

## 2023-04-24 RX ADMIN — BUTALBITAL, ACETAMINOPHEN, AND CAFFEINE 1 TABLET: 50; 325; 40 TABLET ORAL at 04:25

## 2023-04-24 RX ADMIN — ENOXAPARIN SODIUM 30 MG: 100 INJECTION SUBCUTANEOUS at 17:12

## 2023-04-24 RX ADMIN — LEVOFLOXACIN 750 MG: 5 INJECTION, SOLUTION INTRAVENOUS at 14:41

## 2023-04-24 RX ADMIN — ALPRAZOLAM 1 MG: 0.5 TABLET ORAL at 22:05

## 2023-04-24 RX ADMIN — ATORVASTATIN CALCIUM 40 MG: 40 TABLET, FILM COATED ORAL at 22:05

## 2023-04-24 RX ADMIN — TAMSULOSIN HYDROCHLORIDE 0.4 MG: 0.4 CAPSULE ORAL at 08:20

## 2023-04-24 RX ADMIN — DILTIAZEM HYDROCHLORIDE 120 MG: 120 CAPSULE, COATED, EXTENDED RELEASE ORAL at 08:20

## 2023-04-24 RX ADMIN — FAMOTIDINE 20 MG: 20 TABLET, FILM COATED ORAL at 17:12

## 2023-04-24 NOTE — PROGRESS NOTES
Hospitalist Progress Note    Subjective:   Daily Progress Note: 4/24/2023 11:14 AM    Hospital Course:  Rahul Poole is a 60 y.o.  female with hx of anxiety, PUD, TIA, LEVI, cocaine abuse presented to ED with c/o left sided numbness and tingling for 2 days. Patient reports that she had left sided head tingling and sharp pain on temporal region which then radiated to right temporal region. Patient also noticed that her left upper and left lower extremity was numb and felt \" funny\". Patient reports speech changes. Denies any weakness. Patient reports she has been having nose bleeding since yesterday. Had 3 episodes of bleeding with blood clots today. Patient reports that she follows cardiologist, and is taking baby aspirin, rosuvastatin and another pill for her heart. Reports using CPAP at home. Subjective:  Patient reports improvement in left sided tingling and weakness. Had urinary retention yesterday, mccollum placed. Assessment / Plan:  Acute stroke:  CT head negative for stroke  MRI brain negative for stroke.  Unclear why patient had weakness on left side- now improving  ECHO pending  Appreciate Neurology, PT/OT and speech therapy consult  Continue aspirin and statin    Acute urinary retention  Acute UTI  Continue ceftriaxone  Voiding trial prior to discharge  If fails, will discharge patient with mccollum with outpatient urology follow up     Internal carotid aneurysm  MCA aneurysm  Outpatient neurosurgery follow up     Epistaxis:  Will monitor  Continue  afrin prn for nose bleeding     LEVI:  Continue CPAP     GERD:  Resume famotidine        Anxiety:  Continue alprazolam     Hypertension:  Continue diltiazem        Code Status: full code  DVT Prophylaxis: lovenox  GI Prophylaxis: famotidine     Medical Decision Making:     Labs reviewed by myself   No labs from today     Diagnostic data reviewed by myself        Toxic drug monitoring           30.0 - 39.9 Obese / Body mass index is 36.12 kg/m². Code status: Full  Prophylaxis: Lovenox  Recommended Disposition: acute rehab  OBEY: 4/25/23  Barrier: Acute rehab vs clinical improvement.         Current Facility-Administered Medications   Medication Dose Route Frequency    levoFLOXacin (LEVAQUIN) 750 mg in D5W IVPB  750 mg IntraVENous Q24H    tamsulosin (FLOMAX) capsule 0.4 mg  0.4 mg Oral DAILY    butalbital-acetaminophen-caffeine (FIORICET, ESGIC) -40 mg per tablet 1 Tablet  1 Tablet Oral Q6H PRN    acetaminophen (TYLENOL) tablet 650 mg  650 mg Oral Q4H PRN    Or    acetaminophen (TYLENOL) solution 650 mg  650 mg Per NG tube Q4H PRN    Or    acetaminophen (TYLENOL) suppository 650 mg  650 mg Rectal Q4H PRN    aspirin chewable tablet 81 mg  81 mg Oral DAILY    atorvastatin (LIPITOR) tablet 40 mg  40 mg Oral QHS    enoxaparin (LOVENOX) injection 30 mg  30 mg SubCUTAneous Q12H    ALPRAZolam (XANAX) tablet 1 mg  1 mg Oral TID PRN    albuterol (PROVENTIL HFA, VENTOLIN HFA, PROAIR HFA) inhaler 2 Puff  2 Puff Inhalation Q4H PRN    famotidine (PEPCID) tablet 20 mg  20 mg Oral BID    traMADoL (ULTRAM) tablet 50 mg  50 mg Oral Q6H PRN    oxymetazoline (AFRIN) 0.05 % nasal spray 2 Spray  2 Spray Both Nostrils BID PRN    dilTIAZem ER (CARDIZEM CD) capsule 120 mg  120 mg Oral DAILY        Review of Systems  Constitutional: No fevers, No chills, No sweats, No fatigue, No Weakness  Eyes: No redness  Ears, nose, mouth, throat, and face: No nasal congestion, No sore throat, No voice change  Respiratory: No Shortness of Breath, No cough, No wheezing  Cardiovascular: No chest pain, No palpitations, No extremity edema  Gastrointestinal: No nausea, No vomiting, No diarrhea, No abdominal pain  Genitourinary: No frequency, No dysuria, No hematuria  Integument/breast: No skin lesion(s)   Neurological: No Confusion, has headaches, No dizziness      Objective:     Visit Vitals  /82   Pulse 69   Temp 98.3 °F (36.8 °C)   Resp 14   Ht 6' (1.829 m)   Wt 120.8 kg (266 lb 5.1 oz)   LMP 2014   SpO2 98%   BMI 36.12 kg/m²      O2 Device: None (Room air)    Temp (24hrs), Av °F (36.7 °C), Min:97.8 °F (36.6 °C), Max:98.3 °F (36.8 °C)      701 -  190  In: 500 [P.O.:500]  Out: -    190 -  0700  In: 1280 [P.O.:1280]  Out: 3750 [Urine:3750]    PHYSICAL EXAM:  Constitutional: No acute distress  Skin: Extremities and face reveal no rashes. HEENT: Sclerae anicteric. Extra-occular muscles are intact. No oral ulcers. The neck is supple and no masses. Cardiovascular: Regular rate and rhythm. Respiratory:  Clear breath sounds bilaterally with no wheezes, rales, or rhonchi. GI: Abdomen nondistended, soft, and nontender. Normal active bowel sounds. Musculoskeletal: No pitting edema of the lower legs. Able to move all ext. Left sided weakness improving  Neurological:  Patient is alert and oriented. Cranial nerves II-XII grossly intact  Psychiatric: Mood appears appropriate       Data Review    No results found for this or any previous visit (from the past 24 hour(s)).                 Time spent 35 minutes involving direct patient care as well as reviewing patient's labs and coordination of care with nursing staff     Care Plan discussed with: Patient/Family/RN/Case Management

## 2023-04-24 NOTE — PROGRESS NOTES
Transition of Care Plan:     RUR:10% \"low risk\"  Disposition: IPR-pending acceptance   If SNF or IPR: Date FOC offered: 04/24/23  Date FOC received:  Date authorization started with reference number:  Date authorization received and expires:  Accepting facility:  Follow up appointments:to be scheduled   DME needed: None at home  Transportation at Discharge: Family vs BLS  [place] or means to access home: Pt has access to home        IM Medicare Letter: 2nd to be given prior to discharge   Is patient a  and connected with the [employer] ? no               Caregiver Contact: Pt's daughter Johnathan Mathias [phone]   Discharge Caregiver contacted prior to discharge? To be contacted  Care Conference needed?: None at this time    Update 1709 pm: CM met with pt at bedside to get IPR options. Pt is requesting for CM to send referral to [facility]. Referral sent pending disposition. CM will continue to follow up with d/c planning. Update 1445 pm: CM met with pt at bedside, introduced role and discussed d/c planning. CM discussed PT/OT recommendations with pt. Pt is agreeable with recommendations. CM gave pt a list of IPR and requested for at least 3 options. CM to meet with pt at bedside to get IPR options at 4pm. CM will continue to follow up with d/c planning. Initial note: Chart reviewed for updates. PT/OT recommendations acknowledged. CM to meet with pt and discuss IPR and given IPR list. CM will continue to follow up with d/c planning.     NORI Figueredo    [phone]

## 2023-04-24 NOTE — PROGRESS NOTES
Problem: Mobility Impaired (Adult and Pediatric)  Goal: *Acute Goals and Plan of Care (Insert Text)  Description: FUNCTIONAL STATUS PRIOR TO ADMISSION: Patient was independent and active without use of DME.    HOME SUPPORT PRIOR TO ADMISSION: The patient lived alone. Patient has friends and Worship family for support if needed. Physical Therapy Goals  Initiated 4/22/2023  1. Patient will move from supine to sit and sit to supine  in bed with independence within 7 day(s). 2.  Patient will transfer from bed to chair and chair to bed with supervision/set-up using the least restrictive device within 7 day(s). 3.  Patient will perform sit to stand with supervision/set-up within 7 day(s). 4.  Patient will ambulate with minimal assistance/contact guard assist for 50 feet with the least restrictive device within 7 day(s). 5.  Patient will ascend/descend 12 stairs with 1 handrail(s) with minimal assistance/contact guard assist within 7 day(s). Outcome: Progressing Towards Goal     PHYSICAL THERAPY TREATMENT  Patient: Fernando Rod (60 y.o. female)  Date: 4/24/2023  Diagnosis: Acute stroke due to ischemia [facility]) [I63.9] <principal problem not specified>      Precautions: Fall  Chart, physical therapy assessment, plan of care and goals were reviewed. ASSESSMENT  Patient continues with skilled PT services and is progressing towards goals. Able to progress to short gait in room but required use of rolling walker which is below her baseline. MRI negative for CVA but patient continues to present with L sided numbness, tingling, and weakness, Mildly to moderately impaired proprioception L great toe and noted to ambulate keeping L LE extended at knee and ambulating on lateral side of L foot. Unable to safely step with R LE with weight through L LE without UE support. L LE weakness appears somewhat effort dependent during formal testing but weakness appears consistent throughout gait and transfers and with L UE. Patient lives alone and reports would have no one that could stay with her upon dc. Recommending IPR to maximize functional potential. Acute PT to follow. Current Level of Function Impacting Discharge (mobility/balance): up to min A x 2 sit to stand    Other factors to consider for discharge: lives alone     PLAN :  Patient continues to benefit from skilled intervention to address the above impairments. Continue treatment per established plan of care. to address goals. Recommendation for discharge: (in order for the patient to meet his/her long term goals)  Therapy 3 hours per day 5-7 days per week    This discharge recommendation:  Has been made in collaboration with the attending provider and/or case management    IF patient discharges home will need the following DME: to be determined (TBD)     SUBJECTIVE:   Patient stated I dropped water everywhere earlier.     OBJECTIVE DATA SUMMARY:   Critical Behavior:  Neurologic State: Alert  Orientation Level: Oriented X4  Cognition: Follows commands  Safety/Judgement: Awareness of environment, Insight into deficits  Functional Mobility Training:  Bed Mobility:     Supine to Sit: Contact guard assistance; Additional time;Bed Modified     Scooting: Stand-by assistance        Transfers:  Sit to Stand: Contact guard assistance;Minimum assistance; Adaptive equipment;Assist x1;Assist x2 (varying levels of assist required, cues for hand and feet placement, occasionally with unsteadiness upon standing and requiring to sit down fairly immediately)  Stand to Sit: Contact guard assistance; Adaptive equipment        Bed to Chair: Minimum assistance;Assist x2;Adaptive equipment (RW)                    Balance:  Sitting: Intact  Standing: Impaired  Standing - Static: Constant support; Fair  Standing - Dynamic : Constant support; Fair  Ambulation/Gait Training:  Distance (ft): 15 Feet (ft)  Assistive Device: Walker, rolling;Gait belt  Ambulation - Level of Assistance: Minimal assistance; Adaptive equipment        Gait Abnormalities: Decreased step clearance (step, through, increased L knee extension during gait cycle, ambulating on lateral edge of L foot throughout,)        Base of Support: Shift to right;Center of gravity altered  Stance: Left decreased  Speed/Alyssa: Pace decreased (<100 feet/min)     Swing Pattern: Left asymmetrical       Pain Rating:  Did not interfere    Activity Tolerance:   Fair, BP stable, HR 70 bpm at rest and ~100 bpm with gait in room    After treatment patient left in no apparent distress:   Sitting in chair, Call bell within reach, and Bed / chair alarm activated    COMMUNICATION/COLLABORATION:   The patients plan of care was discussed with: Occupational therapist, Registered nurse, and Physician.      Yadira Márquez, PT   Time Calculation: 34 mins

## 2023-04-24 NOTE — PROGRESS NOTES
Problem: Dysphagia (Adult)  Goal: *Acute Goals and Plan of Care (Insert Text)  Description: Speech pathology goals  Initiated 4/22/2023  1. Patient will tolerate easy to chew/thin liquid diet with no overt s/s aspiration or adverse effects within 7 days. Goal met 4/24. 2. Patient will tolerate diet liberalization to regular with no adverse effects within 7 days. Outcome: Progressing Towards Goal     Problem: Communication Impaired (Adult)  Goal: *Acute Goals and Plan of Care (Insert Text)  Description: Speech pathology goals  Initiated 4/22/2023  1. Patient will achieve 100% intelligibility at the conversation level given no cues within 7 days. Goal met 4/24. Outcome: Resolved/Met     SPEECH LANGUAGE PATHOLOGY DYSPHAGIA AND SPEECH TREATMENT  Patient: Chel Perez (63 y.o. female)  Date: 4/24/2023  Diagnosis: Acute stroke due to ischemia [facility]) [I63.9] <principal problem not specified>      Precautions: Fall    ASSESSMENT:  Therapy targeted dysphagia and dysarthria. Patient reported good tolerance of current PO diet of easy to chew with thin liquids. She verbalized she typically eats regular foods at home and is planning to have her dentures brought to hospital. Swallow re-evaluated with thin liquids and solids. Functional oropharyngeal swallow appreciated. Anticipate continued improvement, particularly for speed of mastication of solids, when dentures placed. Will advance to regular texture diet with thin liquids at this time. Patient also reported improvement in motor speech. She independently recalled compensatory speech strategies from initial evaluation. Patient demonstrated functional speech at conversation level throughout session. Anticipate continued improvement in articulation when dentures placed. Further intervention for dysarthria not indicated. Will continue to follow for diet tolerance.       PLAN:  Recommendations and Planned Interventions:  Advance to regular texture diet with thin liquids  Encourage use of dentures with meals  Oral medications whole with liquids   Routine oral care   Upright for PO intake, take small bites, slow rate    Patient continues to benefit from skilled intervention to address the above impairments. Continue treatment per established plan of care. Discharge Recommendations: To Be Determined     SUBJECTIVE:   Patient stated I practiced speaking loud with my girlfriend when she was here and it really helped.     OBJECTIVE:   Cognitive and Communication Status:  Neurologic State: Alert  Orientation Level: Oriented X4  Cognition: Appropriate for age attention/concentration, Follows commands  Perception: Appears intact  Perseveration: No perseveration noted  Safety/Judgement: Insight into deficits    Dysphagia Treatment and Interventions:  P.O. Trials:  Patient Position: Upright in bed  Vocal quality prior to P.O.: No impairment  Consistency Presented: Thin liquid; Solid  How Presented: Self-fed/presented; Successive swallows;Straw  Bolus Acceptance: No impairment  Bolus Formation/Control: No impairment  Propulsion: No impairment  Oral Residue: None  Initiation of Swallow: No impairment  Laryngeal Elevation: Functional  Aspiration Signs/Symptoms: None  Pharyngeal Phase Characteristics: No impairment, issues, or problems     Speech Treatment and Interventions: Motor Speech:  AMRs: WFL  SMRs: Trace imprecision  Conversation: WFL  Intelligibility: 100%  Vocal Quality: WFL  Vocal Intensity: WFL  Resonance: WFL  Prosody: WFL  Breath support: Adequate for speech      After treatment:   Patient left in no apparent distress in bed and Call bell within reach    COMMUNICATION/EDUCATION:   The patient's plan of care including recommendations, planned interventions were discussed with: Registered nurse prior to session.        Damien Duverney, SLP  Time Calculation: 25 mins

## 2023-04-24 NOTE — PROGRESS NOTES
Problem: Self Care Deficits Care Plan (Adult)  Goal: *Acute Goals and Plan of Care (Insert Text)  Description: FUNCTIONAL STATUS PRIOR TO ADMISSION: Patient was independent and active without use of DME.     HOME SUPPORT: The patient lived alone with support from friends and [employer] community. Occupational Therapy Goals  Initiated 4/22/2023  1. Patient will perform grooming with minimal assistance/contact guard assist within 7 day(s). 2.  Patient will perform bathing with minimal assistance/contact guard assist within 7 day(s). 3.  Patient will perform lower body dressing with moderate assistance  within 7 day(s). 4.  Patient will perform toilet transfers with moderate assistance  within 7 day(s). 5.  Patient will perform all aspects of toileting with minimal assistance/contact guard assist within 7 day(s). 6.  Patient will participate in upper extremity therapeutic exercise/activities with supervision/set-up for 10 minutes within 7 day(s). Outcome: Progressing Towards Goal   OCCUPATIONAL THERAPY TREATMENT  Patient: Stephanie Quan (60 y.o. female)  Date: 4/24/2023  Diagnosis: Acute stroke due to ischemia [facility]) [I63.9] <principal problem not specified>      Precautions: Fall  Chart, occupational therapy assessment, plan of care, and goals were reviewed. ASSESSMENT  Patient continues with skilled OT services and is progressing towards goals, functional performance limited from independent baseline by L sided numbness, LUE and LLE decreased AROM and strength, impaired balance and functional mobility, impaired coordination, and decreased activity tolerance. Pt was supine in bed, agreeable to participate. She transferred supine>sit with CGA and increased time, demo'd good sitting balance EOB. UE AROM and strength assessed and pt with generally decreased LUE strength, and often required cueing to use LUE during ADL tasks. Pt continues to describe \"squiggly\" objects or a \"film\" in L eye.  She donned socks with CGA and cues for technique. Using RW pt ambulated in room with up to min A x2, pt unsteady and requiring assistance for weight shifting, demo'd difficulty with advancing LLE, required heavy UE support on RW when stepping with RLE. Transferred to chair at end of session where pt performed grooming ADL with setup. Remained in chair at end of session with needs met and VSS. At this time pt is functioning well below her independent baseline and continues to benefit from skilled intervention to address the above impairments. Recommend IPR at discharge. Current Level of Function Impacting Discharge (ADLs): up to min A x2 transfers, setup-max A ADLs    Other factors to consider for discharge: fall risk, lives alone         PLAN :  Patient continues to benefit from skilled intervention to address the above impairments. Continue treatment per established plan of care to address goals. Recommendation for discharge: (in order for the patient to meet his/her long term goals)  Therapy 3 hours per day 5-7 days per week    This discharge recommendation:  Has been made in collaboration with the attending provider and/or case management    IF patient discharges home will need the following DME: TBD       SUBJECTIVE:   Patient stated I dropped water everywhere earlier.     OBJECTIVE DATA SUMMARY:   Cognitive/Behavioral Status:   Alert        Functional Mobility and Transfers for ADLs:  Bed Mobility:  Supine to Sit: Contact guard assistance; Additional time;Bed Modified  Scooting: Stand-by assistance    Transfers:  Sit to Stand: Contact guard assistance;Minimum assistance; Adaptive equipment;Assist x1;Assist x2 (varying levels of assist required, cues for hand and feet placement, occasionally with unsteadiness upon standing and requiring to sit down fairly immediately)     Bed to Chair: Minimum assistance;Assist x2;Adaptive equipment (RW)    Balance:  Sitting: Intact  Standing: Impaired  Standing - Static: Constant support; Fair  Standing - Dynamic : Constant support; Fair    ADL Intervention:       Grooming  Position Performed: Seated in chair  Washing Hands: Set-up       Lower Body Dressing Assistance  Socks: Contact guard assistance (increased time)  Leg Crossed Method Used: Yes  Position Performed: Seated edge of bed  Cues: Verbal cues provided (for technique)       Pain:  Pt reported no pain during session    Activity Tolerance:   Fair    After treatment patient left in no apparent distress:   Sitting in chair, Call bell within reach, and Bed / chair alarm activated    COMMUNICATION/COLLABORATION:   The patients plan of care was discussed with: Physical therapist and Registered nurse.      Yonis Martin OT  Time Calculation: 24 mins

## 2023-04-24 NOTE — PROGRESS NOTES
Problem: Pressure Injury - Risk of  Goal: *Prevention of pressure injury  Description: Document Andrew Scale and appropriate interventions in the flowsheet. Outcome: Progressing Towards Goal  Note: Pressure Injury Interventions: Activity Interventions: Increase time out of bed, PT/OT evaluation    Mobility Interventions: HOB 30 degrees or less, Pressure redistribution bed/mattress (bed type), PT/OT evaluation, Turn and reposition approx. every two hours(pillow and wedges)    Nutrition Interventions: Document food/fluid/supplement intake                     Problem: Patient Education: Go to Patient Education Activity  Goal: Patient/Family Education  Outcome: Progressing Towards Goal     Problem: Falls - Risk of  Goal: *Absence of Falls  Description: Document Arthuro Maillard Fall Risk and appropriate interventions in the flowsheet.   Outcome: Progressing Towards Goal  Note: Fall Risk Interventions:                                Problem: Patient Education: Go to Patient Education Activity  Goal: Patient/Family Education  Outcome: Progressing Towards Goal

## 2023-04-24 NOTE — ROUTINE PROCESS
Message to Dr. Blane Pop in regards to new voiding trials. Clarification of order needed. Urology following pat, per Urology catheter to remain in until follow up appt. 1327 CM aware of pat discharge. Per PT/OT pat will need rehab. CM currently working on.

## 2023-04-25 ENCOUNTER — TELEPHONE (OUTPATIENT)
Dept: NEUROLOGY | Age: 58
End: 2023-04-25

## 2023-04-25 LAB
B PERT DNA SPEC QL NAA+PROBE: NOT DETECTED
BORDETELLA PARAPERTUSSIS PCR, BORPAR: NOT DETECTED
C PNEUM DNA SPEC QL NAA+PROBE: NOT DETECTED
FLUAV SUBTYP SPEC NAA+PROBE: NOT DETECTED
FLUBV RNA SPEC QL NAA+PROBE: NOT DETECTED
HADV DNA SPEC QL NAA+PROBE: NOT DETECTED
HCOV 229E RNA SPEC QL NAA+PROBE: NOT DETECTED
HCOV HKU1 RNA SPEC QL NAA+PROBE: NOT DETECTED
HCOV NL63 RNA SPEC QL NAA+PROBE: NOT DETECTED
HCOV OC43 RNA SPEC QL NAA+PROBE: NOT DETECTED
HMPV RNA SPEC QL NAA+PROBE: NOT DETECTED
HPIV1 RNA SPEC QL NAA+PROBE: NOT DETECTED
HPIV2 RNA SPEC QL NAA+PROBE: NOT DETECTED
HPIV3 RNA SPEC QL NAA+PROBE: NOT DETECTED
HPIV4 RNA SPEC QL NAA+PROBE: NOT DETECTED
M PNEUMO DNA SPEC QL NAA+PROBE: NOT DETECTED
RSV RNA SPEC QL NAA+PROBE: NOT DETECTED
RV+EV RNA SPEC QL NAA+PROBE: NOT DETECTED
SARS-COV-2 RNA RESP QL NAA+PROBE: NOT DETECTED

## 2023-04-25 PROCEDURE — 97110 THERAPEUTIC EXERCISES: CPT

## 2023-04-25 PROCEDURE — 74011250637 HC RX REV CODE- 250/637: Performed by: INTERNAL MEDICINE

## 2023-04-25 PROCEDURE — 97535 SELF CARE MNGMENT TRAINING: CPT

## 2023-04-25 PROCEDURE — 97116 GAIT TRAINING THERAPY: CPT

## 2023-04-25 PROCEDURE — 0202U NFCT DS 22 TRGT SARS-COV-2: CPT

## 2023-04-25 PROCEDURE — 74011250636 HC RX REV CODE- 250/636: Performed by: INTERNAL MEDICINE

## 2023-04-25 PROCEDURE — 97530 THERAPEUTIC ACTIVITIES: CPT

## 2023-04-25 PROCEDURE — 65270000046 HC RM TELEMETRY

## 2023-04-25 PROCEDURE — 92526 ORAL FUNCTION THERAPY: CPT

## 2023-04-25 RX ORDER — ROSUVASTATIN CALCIUM 5 MG/1
5 TABLET, COATED ORAL
COMMUNITY

## 2023-04-25 RX ORDER — FLUTICASONE PROPIONATE 50 MCG
1 SPRAY, SUSPENSION (ML) NASAL
COMMUNITY

## 2023-04-25 RX ORDER — CETIRIZINE HYDROCHLORIDE 5 MG/1
5 TABLET ORAL
COMMUNITY

## 2023-04-25 RX ORDER — FAMOTIDINE 20 MG/1
20 TABLET, FILM COATED ORAL
COMMUNITY

## 2023-04-25 RX ORDER — ASPIRIN 81 MG/1
81 TABLET ORAL DAILY
COMMUNITY

## 2023-04-25 RX ORDER — ISOPROPYL ALCOHOL IN GLYCERIN 95 %-5 %
1 DROPS OTIC (EAR)
COMMUNITY
End: 2023-04-26

## 2023-04-25 RX ADMIN — FAMOTIDINE 20 MG: 20 TABLET, FILM COATED ORAL at 17:17

## 2023-04-25 RX ADMIN — BUTALBITAL, ACETAMINOPHEN, AND CAFFEINE 1 TABLET: 50; 325; 40 TABLET ORAL at 06:23

## 2023-04-25 RX ADMIN — ATORVASTATIN CALCIUM 40 MG: 40 TABLET, FILM COATED ORAL at 23:51

## 2023-04-25 RX ADMIN — BUTALBITAL, ACETAMINOPHEN, AND CAFFEINE 1 TABLET: 50; 325; 40 TABLET ORAL at 17:17

## 2023-04-25 RX ADMIN — FAMOTIDINE 20 MG: 20 TABLET, FILM COATED ORAL at 08:03

## 2023-04-25 RX ADMIN — ENOXAPARIN SODIUM 30 MG: 100 INJECTION SUBCUTANEOUS at 16:27

## 2023-04-25 RX ADMIN — TAMSULOSIN HYDROCHLORIDE 0.4 MG: 0.4 CAPSULE ORAL at 08:03

## 2023-04-25 RX ADMIN — ASPIRIN 81 MG: 81 TABLET, CHEWABLE ORAL at 08:03

## 2023-04-25 RX ADMIN — ALPRAZOLAM 1 MG: 0.5 TABLET ORAL at 23:51

## 2023-04-25 RX ADMIN — ENOXAPARIN SODIUM 30 MG: 100 INJECTION SUBCUTANEOUS at 04:20

## 2023-04-25 RX ADMIN — LEVOFLOXACIN 750 MG: 5 INJECTION, SOLUTION INTRAVENOUS at 14:56

## 2023-04-25 RX ADMIN — DILTIAZEM HYDROCHLORIDE 120 MG: 120 CAPSULE, COATED, EXTENDED RELEASE ORAL at 08:03

## 2023-04-25 NOTE — PROGRESS NOTES
Transition of Care Plan:     RUR: 9% \"low risk\"  Disposition: Accepted to [facility] Acute rehab   If SNF or IPR: Date FOC offered: 04/24/23  Date [address] received: 04/24/23  Date authorization started with reference number:  Date authorization received and expires:  Accepting facility:  Follow up appointments: To be scheduled by IPR   DME needed: None at home  Transportation at Discharge: Family vs BLS  [place] or means to access home: Pt has access to home        IM Medicare Letter: 2nd to be given prior to discharge   Is patient a [place] and connected with the [place]? no               Caregiver Contact: Pt's daughter Ronnie Bermudez [phone]   Discharge Caregiver contacted prior to discharge? To be contacted  Care Conference needed?: None at this time    Update 1620 pm: CM received call from Mahesh Neville from [facility] reporting that their MD is inquiring if pt has someone to take care of her after d/c CM reported pt stated that she lives alone and most of her family lives out of state. Mahesh Neville reported they are unable to take pt without plan of care after rehab. Pt was accepted at [address]. They repprted that they will accept pt on Wednesday. Update 1030 am: CM requested for a PCR Covid test during IDR. [facility] will require pt to have a Covid PCR test before admission. CM will continue to follow up with d/c planning. Initial note: Chart reviewed for updates. CM met with pt to request additional IPR options. Pt is requesting for CM to send referral to [address]  rehab. Referral sent. CM contacted Mahesh Neville admission liaison [phone] to follow up on referral sent 04/24/23. Mahesh Neville reported that she is currently reviewing pt. Mahesh Neville to call CM with disposition. CM will continue to follow up with d/c planning.     NORI Wagner    [phone]

## 2023-04-25 NOTE — TELEPHONE ENCOUNTER
Faxed referral, chris germain, ins card, OV, carotid doppler to [facility] (anyone in group, Dr Tony Trujillo group), ph # [phone], fax # [phone]. Confirmation received.

## 2023-04-25 NOTE — PROGRESS NOTES
Problem: Mobility Impaired (Adult and Pediatric)  Goal: *Acute Goals and Plan of Care (Insert Text)  Description: FUNCTIONAL STATUS PRIOR TO ADMISSION: Patient was independent and active without use of DME.    HOME SUPPORT PRIOR TO ADMISSION: The patient lived alone. Patient has friends and [employer] family for support if needed. Physical Therapy Goals  Initiated 4/22/2023  1. Patient will move from supine to sit and sit to supine  in bed with independence within 7 day(s). 2.  Patient will transfer from bed to chair and chair to bed with supervision/set-up using the least restrictive device within 7 day(s). 3.  Patient will perform sit to stand with supervision/set-up within 7 day(s). 4.  Patient will ambulate with minimal assistance/contact guard assist for 50 feet with the least restrictive device within 7 day(s). 5.  Patient will ascend/descend 12 stairs with 1 handrail(s) with minimal assistance/contact guard assist within 7 day(s). Outcome: Progressing Towards Goal   PHYSICAL THERAPY TREATMENT  Patient: Ponce Strickland (60 y.o. female)  Date: 4/25/2023  Diagnosis: Acute stroke due to ischemia [facility]) [I63.9] <principal problem not specified>      Precautions: Fall  Chart, physical therapy assessment, plan of care and goals were reviewed. ASSESSMENT  Patient continues with skilled PT services and is progressing towards goals. Pt was received in semi-supine and able to participate with therapy services with encouragement. Pt continues to present with decreased left sided strength impairing her functional mobility and gait mechanics with therapy. Pt overall moving with contact guard-Yani with mobility, needing additional time however able to come transition to sitting EOB, working on seated UE/LE exercises tolerating fairly. Pt was able to stand with CGA-Yani with the height of bed elevated and use of RW.  Pt ambulating 30ft with use of RW, needing additional time, with step-to gait pattern needing cueing to progress to step-through, and noted with poor left foot clearance / poor swing through for her LLE. Pt continues to be below her independent PLOF and would be at an increased risk of falls as she lives alone. Continue to recommend IPR upon discharge to maximize her independence. Current Level of Function Impacting Discharge (mobility/balance): CGA-Yani    Other factors to consider for discharge: lives alone, fall risk, decreased strength L > R         PLAN :  Patient continues to benefit from skilled intervention to address the above impairments. Continue treatment per established plan of care. to address goals. Recommendation for discharge: (in order for the patient to meet his/her long term goals)  Therapy 3 hours per day 5-7 days per week    This discharge recommendation:  Has been made in collaboration with the attending provider and/or case management    IF patient discharges home will need the following DME: to be determined (TBD)     SUBJECTIVE:   Patient stated it just feels numb and tingly.     OBJECTIVE DATA SUMMARY:   Critical Behavior:  Neurologic State: Alert  Orientation Level: Oriented X4  Cognition: Follows commands  Safety/Judgement: Insight into deficits  Functional Mobility Training:  Bed Mobility:     Supine to Sit: Contact guard assistance; Additional time;Bed Modified     Scooting: Stand-by assistance     Transfers:  Sit to Stand: Contact guard assistance;Minimum assistance; Additional time;Assist x1 (height of bed raised)  Stand to Sit: Contact guard assistance        Bed to Chair: Contact guard assistance;Minimum assistance; Additional time;Assist x1;Adaptive equipment    Balance:  Sitting: Intact  Standing: Impaired  Standing - Static: Fair;Good;Constant support  Standing - Dynamic : Fair;Constant support  Ambulation/Gait Training:  Distance (ft): 30 Feet (ft)  Assistive Device: Gait belt;Walker, rolling  Ambulation - Level of Assistance: Contact guard assistance;Minimal assistance; Adaptive equipment; Additional time    Gait Abnormalities: Decreased step clearance; Step to gait (poor left foot clearance)     Base of Support: Shift to right;Center of gravity altered  Stance: Left decreased  Speed/Alyssa: Pace decreased (<100 feet/min)  Step Length: Left shortened;Right shortened    Therapeutic Exercises:   LAQ, Seated hip flexion 1x5-10    Pain Rating:  No pain rating received from pt    Activity Tolerance:   Fair    After treatment patient left in no apparent distress:   Sitting in chair, Call bell within reach, and Bed / chair alarm activated    COMMUNICATION/COLLABORATION:   The patients plan of care was discussed with: Occupational therapist and Registered nurse.      Juan Toribio PTA   Time Calculation: 28 mins

## 2023-04-25 NOTE — PROGRESS NOTES
Pharmacy Medication Reconciliation     The patient was interviewed regarding current PTA medication list, use and drug allergies. The patient was questioned regarding use of any other inhalers, topical products, over the counter medications, herbal medications, vitamin products or ophthalmic/nasal/otic medication use. Allergy Update: Penicillins and Hydrocodone    Recommendations/Findings: The following amendments were made to the patient's active medication list on file at [facility]:   1) Additions: famotidine, cetirizine, fluticasone, aspirin, Goody powder, and rosuvastatin    2) Deletions: guaifenesin and ranitidine    3) Changes: none      Pertinent Findings: Patient confirmed allergies and her reaction. Verified preferred pharmacy.    -Clarified PTA med list with patient. PTA medication list was corrected to the following:     Prior to Admission Medications   Prescriptions Last Dose Informant Taking? ALPRAZolam (XANAX) 1 mg tablet 2023 at 1000 Self No   Sig: Take 1 Tablet by mouth three (3) times daily as needed for Anxiety. Aspirin-Acetaminophen-Caffeine (Goody's Extra Strength) 500-325-65 mg pwpk 2023 at 1000 Self Yes   Sig: Take 1 Packet by mouth daily as needed for Pain. albuterol (PROVENTIL HFA, VENTOLIN HFA, PROAIR HFA) 90 mcg/actuation inhaler  Self No   Sig: Take 2 Puffs by inhalation every four (4) hours as needed for Wheezing. aspirin delayed-release 81 mg tablet 2023 at 0600 Self Yes   Sig: Take 1 Tablet by mouth daily. cetirizine (ZYRTEC) 5 mg tablet 2023 Self Yes   Sig: Take 1 Tablet by mouth daily as needed for Allergies. dilTIAZem ER (TIAZAC) 120 mg capsule 2023 at 0600 Self No   Sig: Take 1 Capsule by mouth daily. famotidine (PEPCID) 20 mg tablet 2023 Self Yes   Sig: Take 1 Tablet by mouth daily as needed for Heartburn.    fluticasone propionate (FLONASE) 50 mcg/actuation nasal spray  Self Yes   [place] by Both Nostrils route daily as needed for Allergies. rosuvastatin (CRESTOR) 5 mg tablet 4/20/2023 at 2200 Self Yes   Sig: Take 1 Tablet by mouth nightly.       Facility-Administered Medications: None        Thank you,  Sohail Burciaga, PHARMD

## 2023-04-25 NOTE — PROGRESS NOTES
Problem: Pressure Injury - Risk of  Goal: *Prevention of pressure injury  Description: Document Andrew Scale and appropriate interventions in the flowsheet. Outcome: Progressing Towards Goal  Note: Pressure Injury Interventions: Activity Interventions: Pressure redistribution bed/mattress(bed type), PT/OT evaluation    Mobility Interventions: HOB 30 degrees or less, PT/OT evaluation, Pressure redistribution bed/mattress (bed type)    Nutrition Interventions: Document food/fluid/supplement intake                     Problem: Patient Education: Go to Patient Education Activity  Goal: Patient/Family Education  Outcome: Progressing Towards Goal     Problem: Falls - Risk of  Goal: *Absence of Falls  Description: Document Buddy Fall Risk and appropriate interventions in the flowsheet.   Outcome: Progressing Towards Goal  Note: Fall Risk Interventions:                                Problem: Patient Education: Go to Patient Education Activity  Goal: Patient/Family Education  Outcome: Progressing Towards Goal

## 2023-04-25 NOTE — PROGRESS NOTES
SPEECH LANGUAGE PATHOLOGY DYSPHAGIA TREATMENT/DISCHARGE  Patient: Dee Tena (63 y.o. female)  Date: 4/25/2023  Diagnosis: Acute stroke due to ischemia [facility]) [I63.9] <principal problem not specified>      Precautions: Fall    ASSESSMENT:  Patient with excellent tolerance of regular diet since advancement made yesterday. Patient with timely mastication of crackers despite edentulous state. No overt s/s aspiration despite successive sips of thin. PLAN:  Regular/thin  Patient will be discharged from acute skilled speech therapy at this time. Rationale for discharge:  Goals achieved    Discharge Recommendations:  None     SUBJECTIVE:   Patient stated I am doing so much better. OBJECTIVE:   Cognitive and Communication Status:  Neurologic State: Alert  Orientation Level: Oriented X4  Cognition: Follows commands    Perception: Appears intact    Perseveration: No perseveration noted    Safety/Judgement: Awareness of environment  Dysphagia Treatment:  Oral Assessment:  Oral Assessment  Labial: No impairment  Dentition: Edentulous  Oral Hygiene: wfl  Lingual: No impairment  Velum: Unable to visualize  Mandible: No impairment  P.O. Trials:  Patient Position: up in bed  Vocal quality prior to P.O.: No impairment  Consistency Presented: Thin liquid; Solid  How Presented: Self-fed/presented; Successive swallows;Straw     Bolus Acceptance: No impairment  Bolus Formation/Control: No impairment        Initiation of Swallow: No impairment  Laryngeal Elevation: Functional  Aspiration Signs/Symptoms: None  Pharyngeal Phase Characteristics: No impairment, issues, or problems   Effective Modifications: None        Oral Phase Severity: No impairment  Pharyngeal Phase Severity : No impairment       NOMS:   The NOMS functional outcome measure was used to quantify this patient's level of swallowing impairment.   Based on the NOMS, the patient was determined to be at level 7 for swallow function     NOMS Swallowing Levels:  Level 1 (CN): NPO  Level 2 (CM): NPO but takes consistency in therapy  Level 3 (CL): Takes less than 50% of nutrition p.o. and continues with nonoral feedings; and/or safe with mod cues; and/or max diet restriction  Level 4 (CK): Safe swallow but needs mod cues; and/or mod diet restriction; and/or still requires some nonoral feeding/supplements  Level 5 (CJ): Safe swallow with min diet restriction; and/or needs min cues  Level 6 (CI): Independent with p.o.; rare cues; usually self cues; may need to avoid some foods or needs extra time  Level 7 ([address] [place] [address]): Independent for all p.o.  JOYCE. (2003). National Outcomes Measurement System (NOMS): Adult Speech-Language Pathology User's Guide. Pain:  Pain Scale 1: Numeric (0 - 10)  Pain Intensity 1: 0       After treatment:   Patient left in no apparent distress in bed, Call bell within reach, and Nursing notified    COMMUNICATION/EDUCATION:     The patient's plan of care including recommendations, planned interventions, and recommended diet changes were discussed with: Registered nurse.      Claudetta Arena, SLP  Time Calculation: 8 mins

## 2023-04-25 NOTE — PROGRESS NOTES
Physician Progress Note      PATIENT:               Andre Chacon  CSN #:                  [id number]  :                       1965  ADMIT DATE:       2023 1:52 PM  DISCH DATE:  RESPONDING  PROVIDER #:        Ruben Woodward MD          QUERY TEXT:    Patient admitted with left-sided HA, left-sided facial tingling, left upper and lower extremity weakness. Noted documentation of acute stroke on Dr Chaitanya Winslow  pn with Negative head CT and Brain MRI. In order to support the diagnosis of acute stroke, please include additional clinical indicators in your documentation. Or please document if the diagnosis of acute stroke has been ruled out after further study. The medical record reflects the following:    Risk Factors: 60year old female with Internal carotid aneurysm, MCA aneurysm, HTN and h/o TIA    Clinical Indicators:  --UDS: positive for Barbiturates and Benzodiazepines  -- Head CT: No acute intracranial abnormality and no evidence of significant stenosis    -- Brain MRI: There is no intracranial mass, hemorrhage or evidence of acute infarction. No acute intracranial process is demonstrated. -- Neuro pn: Patient has MRI scan of the brain that was normal, and there is no stroke, and her neurologic exam was back to normal    -- Dr Chaitanya Winslow pn: Unclear why patient had weakness on left side- now improving    Treatment:  Head CT, Brain MRI, UDS, Neuro consult, Continue aspirin and statin    Thank you,  Paz Thomas RN, CDI  Options provided:  -- Acute stroke was ruled out and weakness likely due to, Please document etiology. -- Acute stroke present as evidenced by, Please document evidence.   -- Other - I will add my own diagnosis  -- Disagree - Not applicable / Not valid  -- Disagree - Clinically unable to determine / Unknown  -- Refer to Clinical Documentation Reviewer    PROVIDER RESPONSE TEXT:    Acute stroke was ruled out after study and weakness likely due to unclear cause    Query created by: Shelli Ulloa on 4/24/2023 2:15 PM      Electronically signed by:  Kaykay Ritchie MD 4/25/2023 7:32 AM

## 2023-04-25 NOTE — PROGRESS NOTES
Problem: Self Care Deficits Care Plan (Adult)  Goal: *Acute Goals and Plan of Care (Insert Text)  Description: FUNCTIONAL STATUS PRIOR TO ADMISSION: Patient was independent and active without use of DME.     HOME SUPPORT: The patient lived alone with support from friends and [employer] community. Occupational Therapy Goals  Initiated 4/22/2023  1. Patient will perform grooming with minimal assistance/contact guard assist within 7 day(s). 2.  Patient will perform bathing with minimal assistance/contact guard assist within 7 day(s). 3.  Patient will perform lower body dressing with moderate assistance  within 7 day(s). 4.  Patient will perform toilet transfers with moderate assistance  within 7 day(s). 5.  Patient will perform all aspects of toileting with minimal assistance/contact guard assist within 7 day(s). 6.  Patient will participate in upper extremity therapeutic exercise/activities with supervision/set-up for 10 minutes within 7 day(s). Outcome: Progressing Towards Goal   OCCUPATIONAL THERAPY TREATMENT  Patient: Irene Canas (60 y.o. female)  Date: 4/25/2023  Diagnosis: Acute stroke due to ischemia [facility]) [I63.9] <principal problem not specified>      Precautions: Fall  Chart, occupational therapy assessment, plan of care, and goals were reviewed. ASSESSMENT  Patient continues with skilled OT services and is progressing towards goals. Pt was supine in bed and did well with bed mobility. Worked with pt on moving right and left UE together, moving left UE alone using her vision to help with movement of left UE, picking up items with left UE and crossing body to put item on bed or table. She did well with standing and walking to the door of room and is able to walk distance to bathroom and needs RW and is slow to move. Pt continues to state that her left LLE feels really light and she cant always tell where it is .   She was left sitting up in chair and is progressing     Current Level of Function Impacting Discharge (ADLs): needs assist with ADLs, decreased functional mobility          PLAN :  Patient continues to benefit from skilled intervention to address the above impairments. Continue treatment per established plan of care to address goals. Recommend with staff: Dameon Ritter for meals and walk to bathroom with RW     Recommend next OT session: work on UB ADLs standing and using left UE with right UE    Recommendation for discharge: (in order for the patient to meet his/her long term goals)  Therapy 3 hours per day 5-7 days per week    This discharge recommendation:  Has been made in collaboration with the attending provider and/or case management    IF patient discharges home will need the following DME: tbd       SUBJECTIVE:   Patient stated I dont really know where my left leg is when its on the floor .     OBJECTIVE DATA SUMMARY:   Cognitive/Behavioral Status:  Neurologic State: Alert  Orientation Level: Oriented X4  Cognition: Follows commands  Perception: Appears intact  Perseveration: No perseveration noted  Safety/Judgement: Awareness of environment    Functional Mobility and Transfers for ADLs:  Bed Mobility:  Supine to Sit: Contact guard assistance; Additional time;Bed Modified  Scooting: Stand-by assistance    Transfers:  Sit to Stand: Contact guard assistance;Minimum assistance; Additional time;Assist x1 (height of bed raised)  Functional Transfers  Bathroom Mobility: Contact guard assistance;Stand-by assistance  Toilet Transfer : Contact guard assistance;Stand-by assistance  Bed to Chair: Contact guard assistance;Minimum assistance; Additional time;Assist x1;Adaptive equipment    Balance:  Sitting: Intact  Standing: Impaired  Standing - Static: Fair;Good;Constant support  Standing - Dynamic : Fair;Constant support    ADL Intervention:  Feeding  Feeding Assistance: Set-up    Grooming  Grooming Assistance: Set-up  Position Performed: Standing  Washing Face: Stand-by assistance  Washing Hands: Stand-by assistance    Upper Body Bathing  Bathing Assistance: Minimum assistance  Position Performed: Seated in chair    Type of Bath: Basin/Soap/Water       Toileting  Toileting Assistance: Contact guard assistance;Stand-by assistance  Bladder Hygiene: Contact guard assistance;Stand-by assistance  Bowel Hygiene: Contact guard assistance;Stand-by assistance    Cognitive Retraining  Safety/Judgement: Awareness of environment      Pain:  none    Activity Tolerance:   Fair    After treatment patient left in no apparent distress:   Sitting in chair, Call bell within reach, and Bed / chair alarm activated    COMMUNICATION/COLLABORATION:   The patients plan of care was discussed with: Physical therapist and Registered nurse.      Kristin Corona OT  Time Calculation: 27 mins

## 2023-04-25 NOTE — PROGRESS NOTES
Consult  REFERRED BY:  Franc Melton MD    CHIEF COMPLAINT: 2-day history of numbness in the left side associated with head pain on the left side and feeling funny in the head, and having nosebleeding and spitting up blood and just not feeling well and came in for neurologic checkup. Subjective:     Layne Grimaldo is a 60 y.o. right-handed -American female we are seeing as a new patient, at request of hospitalist, for 2-day history of numbness in the left side associated with head pain on the left side and feeling funny in the head, and having nosebleeding and spitting up blood and just not feeling well and came in for neurologic checkup. 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 Both of these seem asymptomatic. The patient still complains of a subtle left-sided numbness because of decreased effort on the left side when asked to exert herself. Her drug screen was positive for cocaine. She has a past history of bronchitis and sinusitis and back pain and cocaine abuse and dizziness and palpitation the peptic ulcer disease in the past history of TIA with a negative work-up for similar problems. She does not abuse alcohol and is an ex-smoker. Medications include inhalers and diltiazem and Mucinex. He is now on aspirin and Lipitor pending her work-up and her MRI scan is pending. Patient denies any chest pain or palpitations or cardiac symptoms associated with this. Still has a mild headache on the left side. Patient has MRI scan of the brain that was normal, and there is no stroke, and her neurologic exam was back to normal, and she will get a Doppler tomorrow, but that is okay we will follow as needed after that.   Patient's Doppler study was normal, and patient stable neurologically, and we will put in a referral to interventional neuroradiology for her asymptomatic aneurysms, but she is stable neurologically we will follow as needed for now, and 35 minutes spent with the patient going over her Doppler and discussing this in detail and she agrees with plans and therapy as above  Past Medical History:   Diagnosis Date    Acute bronchitis 2015    Acute sinusitis     Anxiety     Arthritis     Back pain     Cocaine abuse with cocaine-induced disorder (HCC)     Delivery normal     x4    Dizziness     Gastrointestinal disorder     \"stomach ulcer\"    Other ill-defined conditions(799.89)     anemia    Palpitations     Peptic ulcer disease     Sciatica     TIA (transient ischemic attack)     Upper respiratory infection       Past Surgical History:   Procedure Laterality Date    HX GYN      tubal ligation    HX HEENT      sinus surgery    HX TONSILLECTOMY      HX TONSILLECTOMY      OR UNLISTED PROCEDURE ABDOMEN PERITONEUM & OMENTUM      ulcer     Family History   Problem Relation Age of Onset    Stroke Mother     Heart Disease Mother       Social History     Tobacco Use    Smoking status: Former     Packs/day: 0.50     Years: 15.00     Pack years: 7.50     Types: Cigarettes     Quit date: 2016     Years since quittin.0    Smokeless tobacco: Never   Substance Use Topics    Alcohol use: No         Current Facility-Administered Medications:     levoFLOXacin (LEVAQUIN) 750 mg in D5W IVPB, 750 mg, IntraVENous, Q24H, Carmen Montgomery MD, Last Rate: 100 mL/hr at 23 1441, 750 mg at 23 1441    tamsulosin (FLOMAX) capsule 0.4 mg, 0.4 mg, Oral, DAILY, Carmen Montgomery MD, 0.4 mg at 23 0820    butalbital-acetaminophen-caffeine (FIORICET, ESGIC) -40 mg per tablet 1 Tablet, 1 Tablet, Oral, Q6H PRN, Carmen Montgomery MD, 1 Tablet at 23 0425    acetaminophen (TYLENOL) tablet 650 mg, 650 mg, Oral, Q4H PRN **OR** acetaminophen (TYLENOL) solution 650 mg, 650 mg, Per NG tube, Q4H PRN **OR** acetaminophen (TYLENOL) suppository 650 mg, 650 mg, Rectal, Q4H PRN, Flor Flores MD    aspirin chewable tablet 81 mg, 81 mg, Oral, DAILY, Maya Coates MD, 81 mg at 04/24/23 0820    atorvastatin (LIPITOR) tablet 40 mg, 40 mg, Oral, QHS, Flor Flores MD, 40 mg at 04/23/23 2252    enoxaparin (LOVENOX) injection 30 mg, 30 mg, SubCUTAneous, Q12H, Maya Coates MD, 30 mg at 04/24/23 1712    ALPRAZolam (XANAX) tablet 1 mg, 1 mg, Oral, TID PRN, Flor Flores MD, 1 mg at 04/23/23 2252    albuterol (PROVENTIL HFA, VENTOLIN HFA, PROAIR HFA) inhaler 2 Puff, 2 Puff, Inhalation, Q4H PRN, Flor Flores MD    famotidine (PEPCID) tablet 20 mg, 20 mg, Oral, BID, Maya Coates MD, 20 mg at 04/24/23 1712    traMADoL (ULTRAM) tablet 50 mg, 50 mg, Oral, Q6H PRN, Flor Flores MD    oxymetazoline (AFRIN) 0.05 % nasal spray 2 Spray, 2 Spray, Both Nostrils, BID PRN, Flor Flores MD    dilTIAZem ER (CARDIZEM CD) capsule 120 mg, 120 mg, Oral, DAILY, Flor Florse MD, 120 mg at 04/24/23 0820        Allergies   Allergen Reactions    Penicillins Anaphylaxis     Has taken cephalexin without problem    Hydrocodone Itching      MRI Results (most recent):  Results from [place] encounter on 04/21/23    MRI BRAIN WO CONT    Narrative  CLINICAL HISTORY: CODE S.    INDICATION: Code. COMPARISON: CTA 4/21/2023    TECHNIQUE: MR examination of the brain includes axial and sagittal T1, coronal  T2, axial T2, axial FLAIR, axial gradient echo, axial DWI. CONTRAST: None    FINDINGS:  There is no intracranial mass, hemorrhage or evidence of acute infarction. IACs are symmetric in size. The brain architecture is normal. There is no evidence of midline shift or  mass-effect. There are no extra-axial fluid collections. There is no Chiari or  syrinx. The pituitary and infundibulum are grossly unremarkable. There is no  skull base mass. Cerebellopontine angles are grossly unremarkable.  The major  intracranial vascular flow-voids are unremarkable. The cavernous sinuses are  symmetric. Optic chiasm and infundibulum grossly unremarkable. Orbits are  grossly symmetric. Dural venous sinuses are grossly patent. The mastoid air cells are well pneumatized and clear. Impression  Normal MRI of the brain. There is no intracranial mass, hemorrhage or evidence of acute infarction. No acute intracranial process is demonstrated. Results from [place] encounter on 04/21/23    MRI BRAIN WO CONT    Narrative  CLINICAL HISTORY: CODE S.    INDICATION: Code. COMPARISON: CTA 4/21/2023    TECHNIQUE: MR examination of the brain includes axial and sagittal T1, coronal  T2, axial T2, axial FLAIR, axial gradient echo, axial DWI. CONTRAST: None    FINDINGS:  There is no intracranial mass, hemorrhage or evidence of acute infarction. IACs are symmetric in size. The brain architecture is normal. There is no evidence of midline shift or  mass-effect. There are no extra-axial fluid collections. There is no Chiari or  syrinx. The pituitary and infundibulum are grossly unremarkable. There is no  skull base mass. Cerebellopontine angles are grossly unremarkable. The major  intracranial vascular flow-voids are unremarkable. The cavernous sinuses are  symmetric. Optic chiasm and infundibulum grossly unremarkable. Orbits are  grossly symmetric. Dural venous sinuses are grossly patent. The mastoid air cells are well pneumatized and clear. Impression  Normal MRI of the brain. There is no intracranial mass, hemorrhage or evidence of acute infarction. No acute intracranial process is demonstrated.     Review of Systems:  A comprehensive review of systems was negative except for: Constitutional: positive for malaise and anorexia  Respiratory: positive for sputum, hemoptysis, wheezing, dyspnea on exertion, or chronic bronchitis  Musculoskeletal: positive for myalgias, arthralgias, stiff joints, and back pain  Neurological: positive for headaches and paresthesia  Behvioral/Psych: positive for cocaine abuse, anxiety, and depression   Vitals:    04/24/23 1435 04/24/23 1515 04/24/23 1543 04/24/23 1920   BP: 121/77   (!) 147/78   Pulse: 75   67   Resp:    15   Temp:  98 °F (36.7 °C)  98.4 °F (36.9 °C)   SpO2:    98%   Weight:   266 lb (120.7 kg)    Height:   6' (1.829 m)      Objective:     I      NEUROLOGICAL EXAM:    Appearance: The patient is well developed, well nourished, mildly overweight, provides a coherent history and is in no acute distress. Mental Status: Oriented to time, place and person, and the president, cognitive function is normal and speech is fluent and no aphasia or dysarthria. Mood and affect appropriate but depressed. Cranial Nerves:   Intact visual fields. Fundi are benign, disc are flat, no lesions seen on funduscopy. ATIYA, EOM's full, no nystagmus, no ptosis. Facial sensation is normal. Corneal reflexes are not tested. Facial movement is symmetric. Hearing is normal bilaterally. Palate is midline with normal sternocleidomastoid and trapezius muscles are normal. Tongue is midline. Neck without meningismus or bruits  Temporal arteries are not tender or enlarged  TMJ areas are not tender on palpation   Motor:  4/5 strength in upper and lower proximal and distal muscles, with slight decreased effort on the left side compared to the right. Normal bulk and tone. No fasciculations. Rapid alternating movement is symmetric and slow bilaterally   Reflexes:   Deep tendon reflexes 1+/4 and symmetrical.  No babinski or clonus present   Sensory:   Normal to touch, pinprick and vibration and temperature. DSS is intact   Gait:  Not tested gait for patient's age. Tremor:   No tremor noted. Cerebellar:  Not tested abnormal cerebellar signs present on Romberg and tandem testing and finger-nose-finger exam.   Neurovascular:  Normal heart sounds and regular rhythm, peripheral pulses decreased, and no carotid bruits.            Assessment: ICD-10-CM ICD-9-CM    1. Cerebrovascular accident (CVA), unspecified mechanism ([facility]  I63.9 434.91       2. Acute stroke due to ischemia ([facility] [I63.9 (ICD-10-CM)]  I63.9 434.91       3. Bilateral carotid artery stenosis [I65.23 (ICD-10-CM)]  I65.23 433.10      433.30       4. Cerebral aneurysm, nonruptured [I67.1 (ICD-10-CM)]  I67.1 437.3       5. Left sided numbness [R20.0 (ICD-10-CM)]  R20.0 782.0       6. Transient ischemic attack involving right internal carotid artery [G45.1 (ICD-10-CM)]  G45.1 435.8       7. Abnormality of gait and mobility [R26.9 (ICD-10-CM)]  R26.9 781.2       8. Anxiety [F41.9 (ICD-10-CM)]  F41.9 300.00       9. Cocaine abuse ([facility] [F14.10 (ICD-10-CM)]  F14.10 305.60       10. Paraesthesia of skin [782.0 (ICD-9-CM)]   782.0       11. 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 It is possible this is some type of vasospastic event from her cocaine use. She has 2 incidental cerebral aneurysms that are small and asymptomatic, and we can send her to interventional neuroradiology as an outpatient when she goes home  Patient certainly could be high risk for stroke if she continues take cocaine does not modify her risk factors for stroke.   Patient has MRI scan of the brain that was normal, and there is no stroke, and her neurologic exam was back to normal,  Patient's Doppler study was normal, and patient stable neurologically, and we will put in a referral to interventional neuroradiology for her asymptomatic aneurysms, but she is stable neurologically we will follow as needed for now, and 35 minutes spent with the patient going over her Doppler and discussing this in detail and she agrees with plans and therapy as above  35 minutes spent with the patient and we have personally reviewed all her x-rays and films, and have just reviewed the MRI scan that just returned also, it shows that was normal patient did not have any stroke or TIAs apparently.     Signed By: Malathi Do MD     April 24, 2023       CC: Estefanía Celestin MD  FAX: [phone]

## 2023-04-25 NOTE — PROGRESS NOTES
Hospitalist Progress Note    Subjective:   Daily Progress Note: 4/25/2023 11:14 AM    Hospital Course:  Sarah Roth is a 60 y.o.  female with hx of anxiety, PUD, TIA, LEVI, cocaine abuse presented to ED with c/o left sided numbness and tingling for 2 days. Patient reports that she had left sided head tingling and sharp pain on temporal region which then radiated to right temporal region. Patient also noticed that her left upper and left lower extremity was numb and felt \" funny\". Patient reports speech changes. Denies any weakness. Patient reports she has been having nose bleeding since yesterday. Had 3 episodes of bleeding with blood clots today. Patient reports that she follows cardiologist, and is taking baby aspirin, rosuvastatin and another pill for her heart. Reports using CPAP at home. Subjective:  Patient denies new complaints. Assessment / Plan:  Acute stroke:  CT head negative for stroke  MRI brain negative for stroke. Unclear why patient had weakness on left side- now improving  ECHO pending  Appreciate Neurology, PT/OT and speech therapy consult  Continue aspirin and statin    Acute urinary retention  Acute UTI  Continue levofloxacin  Continue tamsulosin  Voiding trial prior to discharge  If fails, will discharge patient with veda with outpatient urology follow up     Internal carotid aneurysm  MCA aneurysm  Outpatient neurosurgery follow up     Epistaxis:  Will monitor  Continue  afrin prn for nose bleeding     LEVI:  Continue CPAP     GERD:  Resume famotidine        Anxiety:  Continue alprazolam     Hypertension:  Continue diltiazem        Code Status: full code  DVT Prophylaxis: lovenox  GI Prophylaxis: famotidine     Medical Decision Making:     Labs reviewed by myself   No labs from today     Diagnostic data reviewed by myself        Toxic drug monitoring           30.0 - 39.9 Obese / Body mass index is 36.08 kg/m².     Code status: Full  Prophylaxis: Lovenox  Recommended Disposition: acute rehab  OBEY: 4/26/23  Barrier: Acute rehab vs clinical improvement.         Current Facility-Administered Medications   Medication Dose Route Frequency    levoFLOXacin (LEVAQUIN) 750 mg in D5W IVPB  750 mg IntraVENous Q24H    tamsulosin (FLOMAX) capsule 0.4 mg  0.4 mg Oral DAILY    butalbital-acetaminophen-caffeine (FIORICET, ESGIC) -40 mg per tablet 1 Tablet  1 Tablet Oral Q6H PRN    acetaminophen (TYLENOL) tablet 650 mg  650 mg Oral Q4H PRN    Or    acetaminophen (TYLENOL) solution 650 mg  650 mg Per NG tube Q4H PRN    Or    acetaminophen (TYLENOL) suppository 650 mg  650 mg Rectal Q4H PRN    aspirin chewable tablet 81 mg  81 mg Oral DAILY    atorvastatin (LIPITOR) tablet 40 mg  40 mg Oral QHS    enoxaparin (LOVENOX) injection 30 mg  30 mg SubCUTAneous Q12H    ALPRAZolam (XANAX) tablet 1 mg  1 mg Oral TID PRN    albuterol (PROVENTIL HFA, VENTOLIN HFA, PROAIR HFA) inhaler 2 Puff  2 Puff Inhalation Q4H PRN    famotidine (PEPCID) tablet 20 mg  20 mg Oral BID    traMADoL (ULTRAM) tablet 50 mg  50 mg Oral Q6H PRN    oxymetazoline (AFRIN) 0.05 % nasal spray 2 Spray  2 Spray Both Nostrils BID PRN    dilTIAZem ER (CARDIZEM CD) capsule 120 mg  120 mg Oral DAILY        Review of Systems  Constitutional: No fevers, No chills, No sweats, No fatigue, No Weakness  Eyes: No redness  Ears, nose, mouth, throat, and face: No nasal congestion, No sore throat, No voice change  Respiratory: No Shortness of Breath, No cough, No wheezing  Cardiovascular: No chest pain, No palpitations, No extremity edema  Gastrointestinal: No nausea, No vomiting, No diarrhea, No abdominal pain  Genitourinary: No frequency, No dysuria, No hematuria  Integument/breast: No skin lesion(s)   Neurological: No Confusion, has headaches, No dizziness      Objective:     Visit Vitals  BP (!) 148/122   Pulse 75   Temp 98.1 °F (36.7 °C)   Resp 13   Ht 6' (1.829 m)   Wt 120.7 kg (266 lb)   LMP 02/28/2014   SpO2 98%   BMI 36.08 kg/m²      O2 Device: None (Room air)    Temp (24hrs), Av.1 °F (36.7 °C), Min:97.9 °F (36.6 °C), Max:98.4 °F (36.9 °C)      701 - 1900  In: -   Out: 800 [Urine:800]  1901 -  07  In: 650 [P.O.:500; I.V.:150]  Out: 3650 [Urine:3650]    PHYSICAL EXAM:  Constitutional: No acute distress  Skin: Extremities and face reveal no rashes. HEENT: Sclerae anicteric. Extra-occular muscles are intact. No oral ulcers. The neck is supple and no masses. Cardiovascular: Regular rate and rhythm. Respiratory:  Clear breath sounds bilaterally with no wheezes, rales, or rhonchi. GI: Abdomen nondistended, soft, and nontender. Normal active bowel sounds. Musculoskeletal: No pitting edema of the lower legs. Able to move all ext. Left sided weakness improving  Neurological:  Patient is alert and oriented.  Cranial nerves II-XII grossly intact  Psychiatric: Mood appears appropriate       Data Review    Recent Results (from the past 24 hour(s))   DUPLEX CAROTID BILATERAL    Collection Time: 23  1:53 PM   Result Value Ref Range    Right subclavian prox PSV 92.5 cm/s    Right subclavian prox EDV 0.0 cm/s    Right cca dist sys 71.6 cm/s    Right CCA dist valdez 14.2 cm/s    Right CCA prox sys 75.5 cm/s    Right CCA prox valdez 12.9 cm/s    Right ICA dist sys 51.6 cm/s    Right ICA dist valdez 18.2 cm/s    Right ICA mid sys 37.7 cm/s    Right ICA mid valdez 19.4 cm/s    Right ICA prox sys 27.3 cm/s    Right ICA prox valdez 12.9 cm/s    Right eca sys 104.2 cm/s    RIGHT EXTERNAL CAROTID ARTERY D 7.70 cm/s    Right vertebral sys 39.0 cm/s    RIGHT VERTEBRAL ARTERY D 10.30 cm/s    Right ICA/CCA sys 0.7 no units    Left subclavian prox .6 cm/s    Left subclavian prox EDV 0.0 cm/s    Left CCA dist sys 79.4 cm/s    Left CCA dist valdez 16.8 cm/s    Left CCA prox sys 83.3 cm/s    Left CCA prox valdez 16.8 cm/s    Left ICA dist sys 49.0 cm/s    Left ICA dist valdez 17.8 cm/s    Left ICA mid sys 43.0 cm/s    Left ICA mid valdez 11.9 cm/s Left ICA prox sys 54.6 cm/s    Left ICA prox valdez 14.2 cm/s    Left ECA sys 33.8 cm/s    LEFT EXTERNAL CAROTID ARTERY D 0.00 cm/s    Left vertebral sys 46.8 cm/s    LEFT VERTEBRAL ARTERY D 15.50 cm/s    Left ICA/CCA sys 0.70 no units   ECHO ADULT FOLLOW-UP OR LIMITED    Collection Time: 04/24/23  6:56 PM   Result Value Ref Range    IVSd 1.2 (A) 0.6 - 0.9 cm    LVIDd 4.6 3.9 - 5.3 cm    LVIDs 3.7 cm    LVPWd 0.8 0.6 - 0.9 cm    LA Diameter 3.4 cm    Ascending Aorta 2.4 cm    Aortic Root 3.5 cm    Fractional Shortening 2D 20 28 - 44 %    LVIDd Index 1.92 cm/m2    LVIDs Index 1.54 cm/m2    LV RWT Ratio 0.35     LV Mass 2D 158.8 67 - 162 g    LV Mass 2D Index 66.2 43 - 95 g/m2    LA Size Index 1.42 cm/m2    LA/AO Root Ratio 0.97     Ao Root Index 1.46 cm/m2    Ascending Aorta Index 1.00 cm/m2                   Time spent 35 minutes involving direct patient care as well as reviewing patient's labs and coordination of care with nursing staff     Care Plan discussed with: Patient/Family/RN/Case Management

## 2023-04-26 VITALS
HEART RATE: 68 BPM | HEIGHT: 72 IN | OXYGEN SATURATION: 98 % | SYSTOLIC BLOOD PRESSURE: 117 MMHG | RESPIRATION RATE: 16 BRPM | TEMPERATURE: 98.2 F | WEIGHT: 266 LBS | DIASTOLIC BLOOD PRESSURE: 78 MMHG | BODY MASS INDEX: 36.03 KG/M2

## 2023-04-26 PROCEDURE — 74011250637 HC RX REV CODE- 250/637: Performed by: INTERNAL MEDICINE

## 2023-04-26 PROCEDURE — 74011250636 HC RX REV CODE- 250/636: Performed by: INTERNAL MEDICINE

## 2023-04-26 RX ORDER — BUTALBITAL, ACETAMINOPHEN AND CAFFEINE 50; 325; 40 MG/1; MG/1; MG/1
1 TABLET ORAL
Qty: 30 TABLET | Refills: 0 | Status: SHIPPED
Start: 2023-04-26

## 2023-04-26 RX ORDER — TAMSULOSIN HYDROCHLORIDE 0.4 MG/1
0.4 CAPSULE ORAL DAILY
Qty: 30 CAPSULE | Refills: 0 | Status: SHIPPED
Start: 2023-04-27

## 2023-04-26 RX ORDER — ATORVASTATIN CALCIUM 40 MG/1
40 TABLET, FILM COATED ORAL
Qty: 30 TABLET | Refills: 0 | Status: SHIPPED
Start: 2023-04-26 | End: 2023-04-26

## 2023-04-26 RX ADMIN — ENOXAPARIN SODIUM 30 MG: 100 INJECTION SUBCUTANEOUS at 04:32

## 2023-04-26 RX ADMIN — TAMSULOSIN HYDROCHLORIDE 0.4 MG: 0.4 CAPSULE ORAL at 09:54

## 2023-04-26 RX ADMIN — ASPIRIN 81 MG: 81 TABLET, CHEWABLE ORAL at 09:54

## 2023-04-26 RX ADMIN — DILTIAZEM HYDROCHLORIDE 120 MG: 120 CAPSULE, COATED, EXTENDED RELEASE ORAL at 09:54

## 2023-04-26 RX ADMIN — FAMOTIDINE 20 MG: 20 TABLET, FILM COATED ORAL at 09:54

## 2023-04-26 NOTE — PROGRESS NOTES
Bedside and Verbal shift change report given to  (oncoming nurse) by Rodrigo Lundberg RN (offgoing nurse). Report included the following information SBAR, Kardex, Intake/Output, MAR, Cardiac Rhythm Sinus rhythm, and Alarm Parameters .

## 2023-04-26 NOTE — DISCHARGE SUMMARY
Hospitalist Discharge Summary     Patient ID:  Stephanie Quan  [id number]  60 y.o.  1965    PCP on record: Elizabeth Peterson MD    Admit date: 4/21/2023  Discharge date and time: 4/26/2023      DISCHARGE DIAGNOSIS:    Vasospasm  Acute urinary retention  UTI  Internal carotid aneurysm  MCA aneurysm  LEVI  GERD        CONSULTATIONS:  IP CONSULT TO NEUROLOGY  IP CONSULT TO UROLOGY    Excerpted HPI from H&P of Abdulaziz Pandya MD:    Stephanie Quan is a 60 y.o.  female with hx of anxiety, PUD, TIA, LEVI, cocaine abuse presented to ED with c/o left sided numbness and tingling for 2 days. Patient reports that she had left sided head tingling and sharp pain on temporal region which then radiated to right temporal region. Patient also noticed that her left upper and left lower extremity was numb and felt \" funny\". Patient reports speech changes. Denies any weakness. Patient reports she has been having nose bleeding since yesterday. Had 3 episodes of bleeding with blood clots today. Patient reports that she follows cardiologist, and is taking baby aspirin, rosuvastatin and another pill for her heart. Reports using CPAP at home.   ______________________________________________________________________  DISCHARGE SUMMARY/HOSPITAL COURSE:  for full details see H&P, daily progress notes, labs, consult notes. Acute stroke:  CT head negative for stroke  MRI brain negative for stroke. Unclear why patient had weakness on left side- now improving  ECHO Left Ventricle: Normal left ventricular systolic function with a visually estimated EF of 55 - 60%. Left ventricle size is normal. Normal wall thickness. Normal wall motion.   Appreciate Neurology, PT/OT and speech therapy consult  C/w crestor PTA, not needed due to CVA  Continue with ASA PTA, not needed due to CVA     Acute urinary retention  UTI  Completed levofloxacin  Continue tamsulosin  Voiding trial at rehab       Internal carotid aneurysm  MCA aneurysm  Outpatient neurosurgery follow up     Epistaxis (resolved)  Will monitor       LEVI:  Continue CPAP     GERD:  Resume famotidine        Anxiety:  Continue alprazolam PTA     Hypertension:  Continue diltiazem        _______________________________________________________________________  Patient seen and examined by me on discharge day. Pertinent Findings:  Gen:    Not in distress  Chest: Clear lungs  CVS:   Regular rhythm. No edema  Abd:  Soft, not distended, not tender  Neuro:  Alert, Oriented x 4, grossly non focal exam  _______________________________________________________________________  DISCHARGE MEDICATIONS:   Current Discharge Medication List        START taking these medications    Details   tamsulosin (FLOMAX) 0.4 mg capsule Take 1 Capsule by mouth daily. Qty: 30 Capsule, Refills: 0  Start date: 4/27/2023      butalbital-acetaminophen-caffeine (FIORICET, ESGIC) -40 mg per tablet Take 1 Tablet by mouth every six (6) hours as needed for Headache or Migraine. Qty: 30 Tablet, Refills: 0  Start date: 4/26/2023           CONTINUE these medications which have NOT CHANGED    Details   famotidine (PEPCID) 20 mg tablet Take 1 Tablet by mouth daily as needed for Heartburn. cetirizine (ZYRTEC) 5 mg tablet Take 1 Tablet by mouth daily as needed for Allergies. fluticasone propionate (FLONASE) 50 mcg/actuation nasal spray 1 Paige by Both Nostrils route daily as needed for Allergies. aspirin delayed-release 81 mg tablet Take 1 Tablet by mouth daily. rosuvastatin (CRESTOR) 5 mg tablet Take 1 Tablet by mouth nightly. dilTIAZem ER (TIAZAC) 120 mg capsule Take 1 Capsule by mouth daily. albuterol (PROVENTIL HFA, VENTOLIN HFA, PROAIR HFA) 90 mcg/actuation inhaler Take 2 Puffs by inhalation every four (4) hours as needed for Wheezing. Qty: 18 g, Refills: 0      ALPRAZolam (XANAX) 1 mg tablet Take 1 Tablet by mouth three (3) times daily as needed for Anxiety.            STOP taking these medications       Aspirin-Acetaminophen-Caffeine (Goody's Extra Strength) 500-325-65 mg pwpk Comments:   Reason for Stopping:         ranitidine HCl (ZANTAC PO) Comments:   Reason for Stopping:               My Recommended Diet, Activity, Wound Care, and follow-up labs are listed in the patient's Discharge Insturctions which I have personally completed and reviewed.   Risk of deterioration: Moderate    Condition at Discharge:  Stable  _____________________________________________________________________    Disposition  SNF/LTC  ____________________________________________________________________    Care Plan discussed with:   Patient, Family, RN, Care Manager, Consultant    ____________________________________________________________________    Code Status: Full Code  ____________________________________________________________________      Condition at Discharge:  Stable  _____________________________________________________________________  Follow up with:   PCP : Franc Melton MD  Follow-up Information       Follow up With Specialties Details Why Contact Info    Franc Melton MD [address] [place] [address]      Jeb Acuna MD Neurology Follow up  [facility] [phone] [facility] Follow up in 1 month(s)  [address] [place] [address]  [phone]                Total time in minutes spent coordinating this discharge (includes going over instructions, follow-up, prescriptions, and preparing report for sign off to her PCP) :  35 minutes    Signed:  Kaleigh Calderon MD

## 2023-04-26 NOTE — PROGRESS NOTES
Problem: Pressure Injury - Risk of  Goal: *Prevention of pressure injury  Description: Document Andrew Scale and appropriate interventions in the flowsheet. Outcome: Progressing Towards Goal  Note: Pressure Injury Interventions: Activity Interventions: Increase time out of bed    Mobility Interventions: HOB 30 degrees or less    Nutrition Interventions: Document food/fluid/supplement intake                     Problem: Patient Education: Go to Patient Education Activity  Goal: Patient/Family Education  Outcome: Progressing Towards Goal     Problem: Falls - Risk of  Goal: *Absence of Falls  Description: Document Brandy Sanabria Fall Risk and appropriate interventions in the flowsheet.   Outcome: Progressing Towards Goal  Note: Fall Risk Interventions:  Mobility Interventions: Bed/chair exit alarm              Elimination Interventions: Bed/chair exit alarm, Call light in reach              Problem: Patient Education: Go to Patient Education Activity  Goal: Patient/Family Education  Outcome: Progressing Towards Goal     Problem: Patient Education: Go to Patient Education Activity  Goal: Patient/Family Education  Outcome: Progressing Towards Goal     Problem: Patient Education: Go to Patient Education Activity  Goal: Patient/Family Education  Outcome: Progressing Towards Goal     Problem: Patient Education: Go to Patient Education Activity  Goal: Patient/Family Education  Outcome: Progressing Towards Goal     Problem: Patient Education: Go to Patient Education Activity  Goal: Patient/Family Education  Outcome: Progressing Towards Goal     Problem: Patient Education: Go to Patient Education Activity  Goal: Patient/Family Education  Outcome: Progressing Towards Goal     Problem: TIA/CVA Stroke: 0-24 hours  Goal: Off Pathway (Use only if patient is Off Pathway)  Outcome: Progressing Towards Goal  Goal: Activity/Safety  Outcome: Progressing Towards Goal  Goal: Consults, if ordered  Outcome: Progressing Towards Goal  Goal: Diagnostic Test/Procedures  Outcome: Progressing Towards Goal  Goal: Nutrition/Diet  Outcome: Progressing Towards Goal  Goal: Discharge Planning  Outcome: Progressing Towards Goal  Goal: Medications  Outcome: Progressing Towards Goal  Goal: Respiratory  Outcome: Progressing Towards Goal  Goal: Treatments/Interventions/Procedures  Outcome: Progressing Towards Goal  Goal: Minimize risk of bleeding post-thrombolytic infusion  Outcome: Progressing Towards Goal  Goal: Monitor for complications post-thrombolytic infusion  Outcome: Progressing Towards Goal  Goal: Psychosocial  Outcome: Progressing Towards Goal  Goal: *Hemodynamically stable  Outcome: Progressing Towards Goal  Goal: *Neurologically stable  Description: Absence of additional neurological deficits    Outcome: Progressing Towards Goal  Goal: *Verbalizes anxiety and depression are reduced or absent  Outcome: Progressing Towards Goal  Goal: *Absence of Signs of Aspiration on Current Diet  Outcome: Progressing Towards Goal  Goal: *Absence of deep venous thrombosis signs and symptoms(Stroke Metric)  Outcome: Progressing Towards Goal  Goal: *Ability to perform ADLs and demonstrates progressive mobility and function  Outcome: Progressing Towards Goal  Goal: *Stroke education started(Stroke Metric)  Outcome: Progressing Towards Goal  Goal: *Dysphagia screen performed(Stroke Metric)  Outcome: Progressing Towards Goal  Goal: *Rehab consulted(Stroke Metric)  Outcome: Progressing Towards Goal     Problem: TIA/CVA Stroke: Day 2 Until Discharge  Goal: Off Pathway (Use only if patient is Off Pathway)  Outcome: Progressing Towards Goal  Goal: Activity/Safety  Outcome: Progressing Towards Goal  Goal: Diagnostic Test/Procedures  Outcome: Progressing Towards Goal  Goal: Nutrition/Diet  Outcome: Progressing Towards Goal  Goal: Discharge Planning  Outcome: Progressing Towards Goal  Goal: Medications  Outcome: Progressing Towards Goal  Goal: Respiratory  Outcome: Progressing Towards Goal  Goal: Treatments/Interventions/Procedures  Outcome: Progressing Towards Goal  Goal: Psychosocial  Outcome: Progressing Towards Goal  Goal: *Verbalizes anxiety and depression are reduced or absent  Outcome: Progressing Towards Goal  Goal: *Absence of aspiration  Outcome: Progressing Towards Goal  Goal: *Absence of deep venous thrombosis signs and symptoms(Stroke Metric)  Outcome: Progressing Towards Goal  Goal: *Optimal pain control at patient's stated goal  Outcome: Progressing Towards Goal  Goal: *Tolerating diet  Outcome: Progressing Towards Goal  Goal: *Ability to perform ADLs and demonstrates progressive mobility and function  Outcome: Progressing Towards Goal  Goal: *Stroke education continued(Stroke Metric)  Outcome: Progressing Towards Goal     Problem: Ischemic Stroke: Discharge Outcomes  Goal: *Verbalizes anxiety and depression are reduced or absent  Outcome: Progressing Towards Goal  Goal: *Verbalize understanding of risk factor modification(Stroke Metric)  Outcome: Progressing Towards Goal  Goal: *Hemodynamically stable  Outcome: Progressing Towards Goal  Goal: *Absence of aspiration pneumonia  Outcome: Progressing Towards Goal  Goal: *Aware of needed dietary changes  Outcome: Progressing Towards Goal  Goal: *Verbalize understanding of prescribed medications including anti-coagulants, anti-lipid, and/or anti-platelets(Stroke Metric)  Outcome: Progressing Towards Goal  Goal: *Tolerating diet  Outcome: Progressing Towards Goal  Goal: *Aware of follow-up diagnostics related to anticoagulants  Outcome: Progressing Towards Goal  Goal: *Ability to perform ADLs and demonstrates progressive mobility and function  Outcome: Progressing Towards Goal  Goal: *Absence of DVT(Stroke Metric)  Outcome: Progressing Towards Goal  Goal: *Absence of aspiration  Outcome: Progressing Towards Goal  Goal: *Optimal pain control at patient's stated goal  Outcome: Progressing Towards Goal  Goal: *Home safety concerns addressed  Outcome: Progressing Towards Goal  Goal: *Describes available resources and support systems  Outcome: Progressing Towards Goal  Goal: *Verbalizes understanding of activation of EMS(911) for stroke symptoms(Stroke Metric)  Outcome: Progressing Towards Goal  Goal: *Understands and describes signs and symptoms to report to providers(Stroke Metric)  Outcome: Progressing Towards Goal  Goal: *Neurolgocially stable (absence of additional neurological deficits)  Outcome: Progressing Towards Goal  Goal: *Verbalizes importance of follow-up with primary care physician(Stroke Metric)  Outcome: Progressing Towards Goal  Goal: *Smoking cessation discussed,if applicable(Stroke Metric)  Outcome: Progressing Towards Goal  Goal: *Depression screening completed(Stroke Metric)  Outcome: Progressing Towards Goal     Problem: Patient Education: Go to Patient Education Activity  Goal: Patient/Family Education  Outcome: Progressing Towards Goal     Problem: Patient Education: Go to Patient Education Activity  Goal: Patient/Family Education  Outcome: Progressing Towards Goal

## 2023-04-26 NOTE — PROGRESS NOTES
TRANSFER - OUT REPORT:    Verbal report given to Kendra(name) on Sommer Tineo  being transferred to Neuro (unit) for routine progression of care       Report consisted of patients Situation, Background, Assessment and   Recommendations(SBAR). Information from the following report(s) SBAR, Kardex, Intake/Output, MAR, Recent Results, and Cardiac Rhythm NSR  was reviewed with the receiving nurse. Lines:   Peripheral IV 04/21/23 Right Antecubital (Active)   Site Assessment Clean, dry, & intact 04/25/23 0303   Phlebitis Assessment 0 04/25/23 0303   Infiltration Assessment 0 04/25/23 0303   Dressing Status Clean, dry, & intact 04/25/23 0303   Dressing Type Transparent 04/25/23 0303   Hub Color/Line Status Pink;Flushed;Capped 04/25/23 0303   Action Taken Open ports on tubing capped 04/25/23 0303   Alcohol Cap Used Yes 04/25/23 0303        Opportunity for questions and clarification was provided. Patient transported with:   Monitor  Tech    Pt urinated 500 this evening. Dual skin check with brian. Pt skin is completely in tact. Report given on every system. All questions answered. David Winslow called back, I called twice back to the unit.

## 2023-04-26 NOTE — PROGRESS NOTES
IV removed. Report called to [facility], accepted by Myesha Paredes RN. EMTALA complete sent with transport team. All personal belongings sent with patient.     Debora Dejesus

## 2023-04-26 NOTE — PROGRESS NOTES
End of Shift Note    Bedside shift change report given to Kasey Griffith RN  (oncoming nurse) by Lb Maria RN (offgoing nurse). Report included the following information SBAR, Kardex, Intake/Output, and MAR    Shift worked:  night   Shift summary and any significant changes:     Transfer from PCU, uneventful, call bell and phone within reach of patient, able to make needs known. Concerns for physician to address:  none   Zone phone for oncoming shift:        Patient Information  Sommer Guzman  60 y.o.  4/21/2023  1:52 PM by Adrianna Berry MD. 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 04/22/2023    Left sided numbness 04/22/2023    Acute stroke due to ischemia ([facility] [address] 04/21/2023    Anxiety 04/27/2012    Chest pain, unspecified 03/15/2012    Abnormality of gait and mobility 03/15/2012    Paraesthesia of skin 03/15/2012    Cocaine abuse ([facility] [address] 03/14/2012    Weakness 03/14/2012     Past Medical History:   Diagnosis Date    Acute bronchitis Jan 2015    Acute sinusitis     Anxiety     Arthritis     Back pain     Cocaine abuse with cocaine-induced disorder (HCC)     Delivery normal     x4    Dizziness     Gastrointestinal disorder     \"stomach ulcer\"    Other ill-defined conditions(799.89)     anemia    Palpitations     Peptic ulcer disease     Sciatica     TIA (transient ischemic attack)     Upper respiratory infection        Core Measures:  CVA: Yes Yes    Activity:  Activity Level:  Up with Assistance  Number times ambulated in hallways past shift: 0  Number of times OOB to chair past shift: 3    Cardiac:   Cardiac Monitoring: Yes      Cardiac Rhythm: Sinus Rhythm    Access: Current line(s): PIV     Genitourinary:   Urinary status: voiding      Respiratory:   O2 Device: None (Room air)  Chronic home O2 use?: NO  Incentive spirometer at bedside: NO       GI:  Last Bowel Movement Date: 04/25/23  Current diet:  ADULT ORAL NUTRITION SUPPLEMENT Dinner; Standard High Calorie/High Protein  ADULT DIET Regular; No milk  Passing flatus: YES  Tolerating current diet: YES       Pain Management:   Patient states pain is manageable on current regimen: YES    Skin:  Andrew Score: 20  Interventions: increase time out of bed    Patient Safety:  Fall Score:  Total Score: 4  Interventions: bed/chair alarm, gripper socks, and pt to call before getting OOB  High Fall Risk: Yes  @Rollbelt  @dexterity to release roll belt  Yes/No ( must document dexterity  here by stating Yes or No here, otherwise this is a restraint and must follow restraint documentation policy.)    DVT prophylaxis:  DVT prophylaxis Med- Yes  DVT prophylaxis SCD or RIAZ- No     Wounds: (If Applicable)  Wounds- No  Location     Active Consults:  IP CONSULT TO NEUROLOGY  IP CONSULT TO UROLOGY    Length of Stay:  Expected LOS: 2d 12h  Actual LOS: 5  Discharge Plan: Yes       Jessica Hart RN

## 2023-04-26 NOTE — PROGRESS NOTES
Transition of Care Plan:     RUR: 11% \"low risk\"  Disposition: [facility]   If SNF or IPR: Date FOC offered: 04/24/23  Date [address] received: 04/24/23  Follow up appointments: To be scheduled by IPR   DME needed: Per IPR  Transportation at Discharge: [id number] [facility] or means to access home: Pt has access to home        IM Medicare Letter: 04/26/23  Is patient a [place] and connected with the [place]? no               Caregiver Contact: [address]  Discharge Caregiver contacted prior to discharge? Patient will contact  Care Conference needed?: Not indicated      CM met with patient at bedside and updated. Patient agreeable to d/c plan (Cyndie Mcgrath). IM letter signed and -placed on chart. Transportation arranged via [facility] for 2pm. PCS on chart. MD updated. Accepting MD: Dr. Robert Taylor and number to call [phone]. Patient will call her daughter and update. Ivon Matias0 RN, [place] [phone]             Transition of Care Plan to SNF/Rehab    Communication to Patient/Family:  Met with patient and family and they are agreeable to the transition plan. The Plan for Transition of Care is related to the following treatment goals: increase mobility and strength. The Patient and/or patient representative was provided with a choice of provider and agrees  with the discharge plan. Yes [x] No []    A Freedom of choice list was provided with basic dialogue that supports the patient's individualized plan of care/goals and shares the quality data associated with the providers. Yes [x] No []    SNF/Rehab Transition:  Patient has been accepted to [facility] and meets criteria for admission.    Patient will transported by [facility] and expected to leave at 2pm.    Communication to SNF/Rehab:  Bedside RN, Anu Lennon, has been notified to update the transition plan to the facility and call report [phone]  Discharge information has been updated on the AVS. And communicated to facility via [facility] Scripts, or CC link. Nursing Please include all hard scripts for controlled substances, med rec and dc summary, and AVS in packet. Reviewed and confirmed with facility, [facility], can manage the patient care needs for the following:     Elisha Fontana with (X) only those applicable:  Medication:  []Medications are available at the facility  []IV Antibiotics    []Controlled Substance - hard copies available sent. []Weekly Labs    Equipment:  []CPAP/BiPAP  []Wound Vacuum  []Pardo or Urinary Device  []PICC/Central Line  []Nebulizer  []Ventilator    Treatment:  []Isolation (for MRSA, VRE, etc.)  []Surgical Drain Management  []Tracheostomy Care  []Dressing Changes  []Dialysis with transportation  []PEG Care  []Oxygen  []Daily Weights for Heart Failure    Dietary:  []Any diet limitations  []Tube Feedings   []Total Parenteral Management (TPN)    Financial Resources:  []Medicaid Application Completed    []UAI Completed and copy given to pt/family  and copy given to pt/family  []A screening has previously been completed. []Level II Completed    [] Private pay individual who will not become   financially eligible for Medicaid within 6 months from admission to a [address] [place] [address]. [] Individual refused to have screening conducted. []Medicaid Application Completed    []The screening denied because it was determined individual did not need/did not qualify for nursing facility level of care. [] Out of state residents seeking direct admission to a [address] facility.   [] Individuals who are inpatients of an out of state hospital, or in state or out of state veterans/ hospital and seek direct admission to a [address] facility  [] Individuals who are pateints or residents of a state owned/operated facility that is licensed by [employer] and seek direct admission to [address] [place] [address]  [] A screening not required for enrollment in [facility] Hospice services as set out in [facility]-  [] [facility]) staff shall perform screenings of the [facility] clients. Advanced Care Plan:  []Surrogate Decision Maker of Care  []POA  []Communicated Code Status and copy sent.     Other:

## 2023-04-26 NOTE — PROGRESS NOTES
Problem: Pressure Injury - Risk of  Goal: *Prevention of pressure injury  Description: Document Andrew Scale and appropriate interventions in the flowsheet. Outcome: Progressing Towards Goal  Note: Pressure Injury Interventions: Activity Interventions: Increase time out of bed, Pressure redistribution bed/mattress(bed type), PT/OT evaluation    Mobility Interventions: HOB 30 degrees or less, Pressure redistribution bed/mattress (bed type)    Nutrition Interventions: Document food/fluid/supplement intake                     Problem: Patient Education: Go to Patient Education Activity  Goal: Patient/Family Education  Outcome: Progressing Towards Goal     Problem: Falls - Risk of  Goal: *Absence of Falls  Description: Document Buddy Fall Risk and appropriate interventions in the flowsheet.   Outcome: Progressing Towards Goal  Note: Fall Risk Interventions:  Mobility Interventions: Bed/chair exit alarm              Elimination Interventions: Bed/chair exit alarm, Call light in reach              Problem: Patient Education: Go to Patient Education Activity  Goal: Patient/Family Education  Outcome: Progressing Towards Goal     Problem: Patient Education: Go to Patient Education Activity  Goal: Patient/Family Education  Outcome: Progressing Towards Goal     Problem: Patient Education: Go to Patient Education Activity  Goal: Patient/Family Education  Outcome: Progressing Towards Goal     Problem: Patient Education: Go to Patient Education Activity  Goal: Patient/Family Education  Outcome: Progressing Towards Goal     Problem: Patient Education: Go to Patient Education Activity  Goal: Patient/Family Education  Outcome: Progressing Towards Goal     Problem: Patient Education: Go to Patient Education Activity  Goal: Patient/Family Education  Outcome: Progressing Towards Goal     Problem: TIA/CVA Stroke: 0-24 hours  Goal: Off Pathway (Use only if patient is Off Pathway)  Outcome: Progressing Towards Goal  Goal: Activity/Safety  Outcome: Progressing Towards Goal  Goal: Consults, if ordered  Outcome: Progressing Towards Goal  Goal: Diagnostic Test/Procedures  Outcome: Progressing Towards Goal  Goal: Nutrition/Diet  Outcome: Progressing Towards Goal  Goal: Discharge Planning  Outcome: Progressing Towards Goal  Goal: Medications  Outcome: Progressing Towards Goal  Goal: Respiratory  Outcome: Progressing Towards Goal  Goal: Treatments/Interventions/Procedures  Outcome: Progressing Towards Goal  Goal: Minimize risk of bleeding post-thrombolytic infusion  Outcome: Progressing Towards Goal  Goal: Monitor for complications post-thrombolytic infusion  Outcome: Progressing Towards Goal  Goal: Psychosocial  Outcome: Progressing Towards Goal  Goal: *Hemodynamically stable  Outcome: Progressing Towards Goal  Goal: *Neurologically stable  Description: Absence of additional neurological deficits    Outcome: Progressing Towards Goal  Goal: *Verbalizes anxiety and depression are reduced or absent  Outcome: Progressing Towards Goal  Goal: *Absence of Signs of Aspiration on Current Diet  Outcome: Progressing Towards Goal  Goal: *Absence of deep venous thrombosis signs and symptoms(Stroke Metric)  Outcome: Progressing Towards Goal  Goal: *Ability to perform ADLs and demonstrates progressive mobility and function  Outcome: Progressing Towards Goal  Goal: *Stroke education started(Stroke Metric)  Outcome: Progressing Towards Goal  Goal: *Dysphagia screen performed(Stroke Metric)  Outcome: Progressing Towards Goal  Goal: *Rehab consulted(Stroke Metric)  Outcome: Progressing Towards Goal     Problem: TIA/CVA Stroke: Day 2 Until Discharge  Goal: Off Pathway (Use only if patient is Off Pathway)  Outcome: Progressing Towards Goal  Goal: Activity/Safety  Outcome: Progressing Towards Goal  Goal: Diagnostic Test/Procedures  Outcome: Progressing Towards Goal  Goal: Nutrition/Diet  Outcome: Progressing Towards Goal  Goal: Discharge Planning  Outcome: Progressing Towards Goal  Goal: Medications  Outcome: Progressing Towards Goal  Goal: Respiratory  Outcome: Progressing Towards Goal  Goal: Treatments/Interventions/Procedures  Outcome: Progressing Towards Goal  Goal: Psychosocial  Outcome: Progressing Towards Goal  Goal: *Verbalizes anxiety and depression are reduced or absent  Outcome: Progressing Towards Goal  Goal: *Absence of aspiration  Outcome: Progressing Towards Goal  Goal: *Absence of deep venous thrombosis signs and symptoms(Stroke Metric)  Outcome: Progressing Towards Goal  Goal: *Optimal pain control at patient's stated goal  Outcome: Progressing Towards Goal  Goal: *Tolerating diet  Outcome: Progressing Towards Goal  Goal: *Ability to perform ADLs and demonstrates progressive mobility and function  Outcome: Progressing Towards Goal  Goal: *Stroke education continued(Stroke Metric)  Outcome: Progressing Towards Goal     Problem: Ischemic Stroke: Discharge Outcomes  Goal: *Verbalizes anxiety and depression are reduced or absent  Outcome: Progressing Towards Goal  Goal: *Verbalize understanding of risk factor modification(Stroke Metric)  Outcome: Progressing Towards Goal  Goal: *Hemodynamically stable  Outcome: Progressing Towards Goal  Goal: *Absence of aspiration pneumonia  Outcome: Progressing Towards Goal  Goal: *Aware of needed dietary changes  Outcome: Progressing Towards Goal  Goal: *Verbalize understanding of prescribed medications including anti-coagulants, anti-lipid, and/or anti-platelets(Stroke Metric)  Outcome: Progressing Towards Goal  Goal: *Tolerating diet  Outcome: Progressing Towards Goal  Goal: *Aware of follow-up diagnostics related to anticoagulants  Outcome: Progressing Towards Goal  Goal: *Ability to perform ADLs and demonstrates progressive mobility and function  Outcome: Progressing Towards Goal  Goal: *Absence of DVT(Stroke Metric)  Outcome: Progressing Towards Goal  Goal: *Absence of aspiration  Outcome: Progressing Towards Goal  Goal: *Optimal pain control at patient's stated goal  Outcome: Progressing Towards Goal  Goal: *Home safety concerns addressed  Outcome: Progressing Towards Goal  Goal: *Describes available resources and support systems  Outcome: Progressing Towards Goal  Goal: *Verbalizes understanding of activation of EMS(911) for stroke symptoms(Stroke Metric)  Outcome: Progressing Towards Goal  Goal: *Understands and describes signs and symptoms to report to providers(Stroke Metric)  Outcome: Progressing Towards Goal  Goal: *Neurolgocially stable (absence of additional neurological deficits)  Outcome: Progressing Towards Goal  Goal: *Verbalizes importance of follow-up with primary care physician(Stroke Metric)  Outcome: Progressing Towards Goal  Goal: *Smoking cessation discussed,if applicable(Stroke Metric)  Outcome: Progressing Towards Goal  Goal: *Depression screening completed(Stroke Metric)  Outcome: Progressing Towards Goal

## 2023-05-24 ENCOUNTER — OFFICE VISIT (OUTPATIENT)
Age: 58
End: 2023-05-24
Payer: MEDICARE

## 2023-05-24 VITALS
WEIGHT: 266.8 LBS | RESPIRATION RATE: 18 BRPM | SYSTOLIC BLOOD PRESSURE: 130 MMHG | TEMPERATURE: 97.4 F | BODY MASS INDEX: 36.14 KG/M2 | HEIGHT: 72 IN | DIASTOLIC BLOOD PRESSURE: 82 MMHG | HEART RATE: 72 BPM

## 2023-05-24 DIAGNOSIS — I67.1 CEREBRAL ANEURYSM: Primary | ICD-10-CM

## 2023-05-24 DIAGNOSIS — I67.1 CEREBRAL ANEURYSM: ICD-10-CM

## 2023-05-24 PROCEDURE — G8417 CALC BMI ABV UP PARAM F/U: HCPCS | Performed by: RADIOLOGY

## 2023-05-24 PROCEDURE — 3017F COLORECTAL CA SCREEN DOC REV: CPT | Performed by: RADIOLOGY

## 2023-05-24 PROCEDURE — G8427 DOCREV CUR MEDS BY ELIG CLIN: HCPCS | Performed by: RADIOLOGY

## 2023-05-24 PROCEDURE — 99204 OFFICE O/P NEW MOD 45 MIN: CPT | Performed by: RADIOLOGY

## 2023-05-24 PROCEDURE — 4004F PT TOBACCO SCREEN RCVD TLK: CPT | Performed by: RADIOLOGY

## 2023-05-24 RX ORDER — ROSUVASTATIN CALCIUM 5 MG/1
5 TABLET, COATED ORAL NIGHTLY
COMMUNITY

## 2023-05-24 RX ORDER — DILTIAZEM HYDROCHLORIDE 120 MG/1
120 CAPSULE, EXTENDED RELEASE ORAL DAILY
COMMUNITY

## 2023-06-19 ENCOUNTER — TELEPHONE (OUTPATIENT)
Age: 58
End: 2023-06-19

## 2023-06-19 NOTE — TELEPHONE ENCOUNTER
Spoke to patient about the fact that I was cancelling her procedure follow-up because patient has delayed procedure. Patient understood. Patient said that she would be busy with her dad this week but Samuel Christensen could reach her Thursday or after.

## 2023-07-24 NOTE — PROGRESS NOTES
Neurointerventional Surgery Clinic Note        Patient: Karan Clement MRN: [id number]  SSN: [id number]    YOB: 1965  Age: 60 y.o. Sex: female      Subjective:      Karan Clement is a 60 y.o. female who is being seen for incidentally diagnosed right MCA and supraclinoid ICA aneurysms on work-up for stroke. Past Medical History:   Diagnosis Date    Acute bronchitis 2015    Acute sinusitis     Anxiety     Arthritis     Back pain     Chest pain     Cocaine abuse with cocaine-induced disorder (HCC)     Delivery normal     x4    Dizziness     Fatigue     Gastrointestinal disorder     \"stomach ulcer\"    Other ill-defined conditions(799.89)     anemia    Palpitations     Peptic ulcer disease     Ringing in ears     Sciatica     Skipped beats     Snoring     Stroke (HCC)     TIA (transient ischemic attack)     Upper respiratory infection      Past Surgical History:   Procedure Laterality Date    GYN      tubal ligation    HEENT      sinus surgery    MD UNLISTED PROCEDURE ABDOMEN PERITONEUM & OMENTUM      ulcer    TONSILLECTOMY      TONSILLECTOMY        Family History   Problem Relation Age of Onset    Stroke Mother     Heart Disease Mother     Lymphoma Mother     Lupus Sister     Lupus Other      Social History     Tobacco Use    Smoking status: Former     Packs/day: 0.50     Types: Cigarettes     Quit date: 2016     Years since quittin.3    Smokeless tobacco: Never   Substance Use Topics    Alcohol use: No      Current Outpatient Medications   Medication Sig Dispense Refill    rosuvastatin (CRESTOR) 5 MG tablet Take 1 tablet by mouth nightly      dilTIAZem (TIADYLT ER) 120 MG extended release capsule Take 1 capsule by mouth daily      ALPRAZolam (XANAX) 1 MG tablet Take 1 tablet by mouth 3 times daily as needed.       albuterol sulfate HFA (PROVENTIL;VENTOLIN;PROAIR) 108 (90 Base) MCG/ACT inhaler Inhale 2 puffs into the lungs every 4 hours as needed (Patient not taking: Reported on 2023)

## 2023-08-04 ENCOUNTER — TELEPHONE (OUTPATIENT)
Age: 58
End: 2023-08-04

## 2023-08-04 NOTE — TELEPHONE ENCOUNTER
----- Message from Efrain Hathaway MD sent at 7/24/2023 11:49 AM EDT -----  Regarding: FU  No rush but pls FU on her regarding the catheter arteriogram that she cancelled earlier.     Thx

## 2023-08-04 NOTE — TELEPHONE ENCOUNTER
Spoke to patient to reschedule Diagnostic angiogram. Patient is very hesitant to schedule angiogram.  Patient reports increased anxiety thinking about procedure. Patient unsure of her decision. Recommended patient schedule another appointment with provider to discuss specifics of angiogram.  Patient agreed. Message sent to [facility] to schedule office visit.

## 2023-08-07 ENCOUNTER — TELEPHONE (OUTPATIENT)
Age: 58
End: 2023-08-07

## 2023-09-06 ENCOUNTER — TELEPHONE (OUTPATIENT)
Age: 58
End: 2023-09-06

## 2023-09-06 NOTE — TELEPHONE ENCOUNTER
LVM for patient letting her know we were trying again to schedule her with Dr. Phuc Chandra so that he can discuss her cancelled procedure and help to alleviate her concerns.

## 2024-01-31 ENCOUNTER — APPOINTMENT (OUTPATIENT)
Facility: HOSPITAL | Age: 59
End: 2024-01-31
Payer: MEDICARE

## 2024-01-31 ENCOUNTER — HOSPITAL ENCOUNTER (EMERGENCY)
Facility: HOSPITAL | Age: 59
Discharge: HOME | End: 2024-01-31
Attending: STUDENT IN AN ORGANIZED HEALTH CARE EDUCATION/TRAINING PROGRAM
Payer: MEDICARE

## 2024-01-31 VITALS
WEIGHT: 256 LBS | OXYGEN SATURATION: 100 % | SYSTOLIC BLOOD PRESSURE: 159 MMHG | HEART RATE: 65 BPM | RESPIRATION RATE: 16 BRPM | BODY MASS INDEX: 34.67 KG/M2 | TEMPERATURE: 97.2 F | HEIGHT: 72 IN | DIASTOLIC BLOOD PRESSURE: 94 MMHG

## 2024-01-31 DIAGNOSIS — S93.402A MODERATE LEFT ANKLE SPRAIN, INITIAL ENCOUNTER: Primary | ICD-10-CM

## 2024-01-31 PROCEDURE — 73610 X-RAY EXAM OF ANKLE: CPT

## 2024-01-31 PROCEDURE — 73562 X-RAY EXAM OF KNEE 3: CPT

## 2024-01-31 PROCEDURE — 6370000000 HC RX 637 (ALT 250 FOR IP): Performed by: STUDENT IN AN ORGANIZED HEALTH CARE EDUCATION/TRAINING PROGRAM

## 2024-01-31 PROCEDURE — 99283 EMERGENCY DEPT VISIT LOW MDM: CPT

## 2024-01-31 RX ORDER — ACETAMINOPHEN 325 MG/1
650 TABLET ORAL EVERY 6 HOURS PRN
Qty: 120 TABLET | Refills: 0 | Status: SHIPPED | OUTPATIENT
Start: 2024-01-31

## 2024-01-31 RX ORDER — ACETAMINOPHEN 325 MG/1
650 TABLET ORAL EVERY 6 HOURS PRN
Qty: 120 TABLET | Refills: 0 | Status: SHIPPED | OUTPATIENT
Start: 2024-01-31 | End: 2024-01-31

## 2024-01-31 RX ORDER — IBUPROFEN 600 MG/1
600 TABLET ORAL 4 TIMES DAILY PRN
Qty: 30 TABLET | Refills: 0 | Status: SHIPPED | OUTPATIENT
Start: 2024-01-31 | End: 2024-01-31

## 2024-01-31 RX ORDER — IBUPROFEN 600 MG/1
600 TABLET ORAL 4 TIMES DAILY PRN
Qty: 30 TABLET | Refills: 0 | Status: SHIPPED | OUTPATIENT
Start: 2024-01-31

## 2024-01-31 RX ORDER — IBUPROFEN 800 MG/1
800 TABLET ORAL
Status: COMPLETED | OUTPATIENT
Start: 2024-01-31 | End: 2024-01-31

## 2024-01-31 RX ADMIN — IBUPROFEN 800 MG: 800 TABLET, FILM COATED ORAL at 16:14

## 2024-01-31 ASSESSMENT — PAIN SCALES - GENERAL
PAINLEVEL_OUTOF10: 10

## 2024-01-31 ASSESSMENT — PAIN DESCRIPTION - ORIENTATION
ORIENTATION: LEFT

## 2024-01-31 ASSESSMENT — PAIN DESCRIPTION - LOCATION
LOCATION: KNEE;ANKLE
LOCATION: LEG
LOCATION: KNEE

## 2024-01-31 ASSESSMENT — PAIN DESCRIPTION - DESCRIPTORS: DESCRIPTORS: ACHING;THROBBING

## 2024-01-31 ASSESSMENT — PAIN - FUNCTIONAL ASSESSMENT
PAIN_FUNCTIONAL_ASSESSMENT: 0-10
PAIN_FUNCTIONAL_ASSESSMENT: 0-10

## 2024-01-31 NOTE — DISCHARGE INSTRUCTIONS
You have been evaluated in the Emergency Department today for ankle pain. Your evaluation showed a sprain of your ankle.    Please rest and elevate your ankle.  Use ice packs for 20 minutes at a time to help with pain and swelling.    We recommend you take 600mg ibuprofen every 6 hours or tylenol 650mg every 6 hours as needed for pain. If needed, you can alternate these medications so that you take one medication every 3 hours. For instance, at noon take ibuprofen, then at 3pm take tylenol, then at 6pm take ibuprofen.     Please follow-up with an orthopedic surgeon as soon as possible in the next 2 weeks if your symptoms persist.    Return to the Emergency Department if you experience worsening pain, numbness, tingling, change of color in your toes, or any other concerning symptoms.    Thank you for choosing us for your care.

## 2024-01-31 NOTE — ED PROVIDER NOTES
SULFATE HFA (PROVENTIL;VENTOLIN;PROAIR) 108 (90 BASE) MCG/ACT INHALER    Inhale 2 puffs into the lungs every 4 hours as needed    ALPRAZOLAM (XANAX) 1 MG TABLET    Take 1 tablet by mouth 3 times daily as needed.    CYCLOBENZAPRINE (FLEXERIL) 10 MG TABLET    Take 10 mg by mouth 3 times daily as needed    DILTIAZEM (TIADYLT ER) 120 MG EXTENDED RELEASE CAPSULE    Take 1 capsule by mouth daily    PANTOPRAZOLE (PROTONIX) 40 MG TABLET    Take 40 mg by mouth daily    ROSUVASTATIN (CRESTOR) 5 MG TABLET    Take 1 tablet by mouth nightly       ALLERGIES     Penicillins and Hydrocodone    FAMILY HISTORY       Family History   Problem Relation Age of Onset    Stroke Mother     Heart Disease Mother     Lymphoma Mother     Lupus Sister     Lupus Other           SOCIAL HISTORY       Social History     Socioeconomic History    Marital status: Single   Tobacco Use    Smoking status: Former     Current packs/day: 0.00     Types: Cigarettes     Quit date: 2016     Years since quittin.8    Smokeless tobacco: Never   Substance and Sexual Activity    Alcohol use: No    Drug use: No           PHYSICAL EXAM       ED Triage Vitals [24 1447]   BP Temp Temp Source Pulse Respirations SpO2 Height Weight - Scale   (!) 159/94 97.4 °F (36.3 °C) Oral 64 18 97 % 1.829 m (6') 116.1 kg (256 lb)       Body mass index is 34.72 kg/m².    Physical Exam    Constitutional: Alert, well-nourished, in no acute distress  Eye: normal conjunctiva, visually fixates and follows  HENT: Normocephalic, nares clear, mucosa moist  Neck: Supple  Respiratory: Respirations non-labored on room air  Cardiovascular: Regular rate and rhythm, radial pulses 2+ and equal  Abdominal: Soft, non-distended, nontender  Musculoskeletal: no deformity or cyanosis, left foot and ankle without ecchymosis, mild tenderness to palpation over left medial and lateral malleoli, mild edema over lateral malleoli, no tenderness to palpation over fibular head, 2+ DP pulse, 5/5 strength

## 2024-01-31 NOTE — ED TRIAGE NOTES
Pt arrives to the ER for complaints of left knee and ankle pain. Pt reports that she was walking when she slipped on a curb. Pt denies falling to the ground but reports her ankle twisted.     Denies any swelling.     Reports taking ibuprofen last night with no relief.

## 2024-02-21 ENCOUNTER — TRANSCRIBE ORDERS (OUTPATIENT)
Facility: HOSPITAL | Age: 59
End: 2024-02-21

## 2024-02-21 ENCOUNTER — HOSPITAL ENCOUNTER (OUTPATIENT)
Facility: HOSPITAL | Age: 59
Discharge: HOME | End: 2024-02-24
Payer: MEDICAID

## 2024-02-21 DIAGNOSIS — R06.09 DYSPNEA ON EXERTION: Primary | ICD-10-CM

## 2024-02-21 DIAGNOSIS — R06.09 DYSPNEA ON EXERTION: ICD-10-CM

## 2024-02-21 PROCEDURE — 71046 X-RAY EXAM CHEST 2 VIEWS: CPT

## 2024-02-28 ENCOUNTER — OFFICE VISIT (OUTPATIENT)
Age: 59
End: 2024-02-28
Payer: MEDICARE

## 2024-02-28 DIAGNOSIS — I67.1 CEREBRAL ANEURYSM: ICD-10-CM

## 2024-02-28 DIAGNOSIS — I67.1 CEREBRAL ANEURYSM: Primary | ICD-10-CM

## 2024-02-28 PROCEDURE — 3017F COLORECTAL CA SCREEN DOC REV: CPT | Performed by: RADIOLOGY

## 2024-02-28 PROCEDURE — 1036F TOBACCO NON-USER: CPT | Performed by: RADIOLOGY

## 2024-02-28 PROCEDURE — G8417 CALC BMI ABV UP PARAM F/U: HCPCS | Performed by: RADIOLOGY

## 2024-02-28 PROCEDURE — 99214 OFFICE O/P EST MOD 30 MIN: CPT | Performed by: RADIOLOGY

## 2024-02-28 PROCEDURE — G8484 FLU IMMUNIZE NO ADMIN: HCPCS | Performed by: RADIOLOGY

## 2024-02-28 PROCEDURE — G8427 DOCREV CUR MEDS BY ELIG CLIN: HCPCS | Performed by: RADIOLOGY

## 2024-02-29 VITALS
OXYGEN SATURATION: 96 % | DIASTOLIC BLOOD PRESSURE: 84 MMHG | HEART RATE: 74 BPM | HEIGHT: 72 IN | BODY MASS INDEX: 37.52 KG/M2 | TEMPERATURE: 98.7 F | SYSTOLIC BLOOD PRESSURE: 126 MMHG | WEIGHT: 277 LBS

## 2024-02-29 LAB
ALBUMIN SERPL-MCNC: 4.3 G/DL (ref 3.8–4.9)
ALBUMIN/GLOB SERPL: 1.2 {RATIO} (ref 1.2–2.2)
ALP SERPL-CCNC: 143 IU/L (ref 44–121)
ALT SERPL-CCNC: 16 IU/L (ref 0–32)
AST SERPL-CCNC: 18 IU/L (ref 0–40)
BILIRUB SERPL-MCNC: 0.2 MG/DL (ref 0–1.2)
BUN SERPL-MCNC: 10 MG/DL (ref 8–27)
BUN/CREAT SERPL: 13 (ref 12–28)
CALCIUM SERPL-MCNC: 9.3 MG/DL (ref 8.7–10.3)
CHLORIDE SERPL-SCNC: 103 MMOL/L (ref 96–106)
CO2 SERPL-SCNC: 26 MMOL/L (ref 20–29)
CREAT SERPL-MCNC: 0.77 MG/DL (ref 0.57–1)
EGFRCR SERPLBLD CKD-EPI 2021: 88 ML/MIN/1.73
ERYTHROCYTE [DISTWIDTH] IN BLOOD BY AUTOMATED COUNT: 13.4 % (ref 11.7–15.4)
GLOBULIN SER CALC-MCNC: 3.5 G/DL (ref 1.5–4.5)
GLUCOSE SERPL-MCNC: 85 MG/DL (ref 70–99)
HCT VFR BLD AUTO: 40.2 % (ref 34–46.6)
HGB BLD-MCNC: 12.9 G/DL (ref 11.1–15.9)
MCH RBC QN AUTO: 27.2 PG (ref 26.6–33)
MCHC RBC AUTO-ENTMCNC: 32.1 G/DL (ref 31.5–35.7)
MCV RBC AUTO: 85 FL (ref 79–97)
PLATELET # BLD AUTO: 303 X10E3/UL (ref 150–450)
POTASSIUM SERPL-SCNC: 4.1 MMOL/L (ref 3.5–5.2)
PROT SERPL-MCNC: 7.8 G/DL (ref 6–8.5)
RBC # BLD AUTO: 4.74 X10E6/UL (ref 3.77–5.28)
SODIUM SERPL-SCNC: 142 MMOL/L (ref 134–144)
WBC # BLD AUTO: 6.1 X10E3/UL (ref 3.4–10.8)

## 2024-03-13 ENCOUNTER — TELEPHONE (OUTPATIENT)
Age: 59
End: 2024-03-13

## 2024-03-13 NOTE — TELEPHONE ENCOUNTER
Spoke to patient to confirm procedure tomorrow.   Arrival time 8:30 am at Patient registration.  Reminder to patient:  -No eating or drinking after midnight the day prior to procedure.   -Take morning medications the morning of procedure with sips of water.   -Do not stop taking Blood Thinners if applicable unless notified by this office.  -You must have a  to drive you home upon discharge.  -Recommend you have someone with you for at least 24-48 hours post procedure.  -No metal of glue in hair.    Allergies Reviewed.

## 2024-03-14 ENCOUNTER — HOSPITAL ENCOUNTER (OUTPATIENT)
Facility: HOSPITAL | Age: 59
Discharge: HOME | End: 2024-03-14
Attending: RADIOLOGY | Admitting: RADIOLOGY
Payer: MEDICARE

## 2024-03-14 VITALS
WEIGHT: 285 LBS | OXYGEN SATURATION: 98 % | BODY MASS INDEX: 38.6 KG/M2 | RESPIRATION RATE: 14 BRPM | DIASTOLIC BLOOD PRESSURE: 78 MMHG | TEMPERATURE: 97.8 F | HEART RATE: 65 BPM | SYSTOLIC BLOOD PRESSURE: 145 MMHG | HEIGHT: 72 IN

## 2024-03-14 DIAGNOSIS — I67.1 CEREBRAL ANEURYSM: ICD-10-CM

## 2024-03-14 PROCEDURE — 6370000000 HC RX 637 (ALT 250 FOR IP): Performed by: RADIOLOGY

## 2024-03-14 PROCEDURE — 6360000004 HC RX CONTRAST MEDICATION: Performed by: RADIOLOGY

## 2024-03-14 PROCEDURE — 6360000002 HC RX W HCPCS: Performed by: RADIOLOGY

## 2024-03-14 PROCEDURE — 36223 PLACE CATH CAROTID/INOM ART: CPT

## 2024-03-14 PROCEDURE — APPNB45 APP NON BILLABLE 31-45 MINUTES

## 2024-03-14 PROCEDURE — 2580000003 HC RX 258: Performed by: RADIOLOGY

## 2024-03-14 PROCEDURE — 2500000003 HC RX 250 WO HCPCS: Performed by: RADIOLOGY

## 2024-03-14 RX ORDER — SODIUM BICARBONATE 42 MG/ML
INJECTION, SOLUTION INTRAVENOUS PRN
Status: COMPLETED | OUTPATIENT
Start: 2024-03-14 | End: 2024-03-14

## 2024-03-14 RX ORDER — LIDOCAINE HYDROCHLORIDE 10 MG/ML
INJECTION, SOLUTION EPIDURAL; INFILTRATION; INTRACAUDAL; PERINEURAL PRN
Status: COMPLETED | OUTPATIENT
Start: 2024-03-14 | End: 2024-03-14

## 2024-03-14 RX ORDER — VERAPAMIL HYDROCHLORIDE 2.5 MG/ML
INJECTION, SOLUTION INTRAVENOUS PRN
Status: COMPLETED | OUTPATIENT
Start: 2024-03-14 | End: 2024-03-14

## 2024-03-14 RX ORDER — MIDAZOLAM HYDROCHLORIDE 2 MG/2ML
INJECTION, SOLUTION INTRAMUSCULAR; INTRAVENOUS PRN
Status: COMPLETED | OUTPATIENT
Start: 2024-03-14 | End: 2024-03-14

## 2024-03-14 RX ORDER — SODIUM CHLORIDE 9 MG/ML
INJECTION, SOLUTION INTRAVENOUS CONTINUOUS PRN
Status: COMPLETED | OUTPATIENT
Start: 2024-03-14 | End: 2024-03-14

## 2024-03-14 RX ORDER — LIDOCAINE 40 MG/G
CREAM TOPICAL PRN
Status: COMPLETED | OUTPATIENT
Start: 2024-03-14 | End: 2024-03-14

## 2024-03-14 RX ORDER — HEPARIN SODIUM 1000 [USP'U]/ML
INJECTION, SOLUTION INTRAVENOUS; SUBCUTANEOUS PRN
Status: COMPLETED | OUTPATIENT
Start: 2024-03-14 | End: 2024-03-14

## 2024-03-14 RX ORDER — ACETAMINOPHEN 325 MG/1
TABLET ORAL PRN
Status: DISCONTINUED | OUTPATIENT
Start: 2024-03-14 | End: 2024-03-14 | Stop reason: HOSPADM

## 2024-03-14 RX ORDER — FENTANYL CITRATE 50 UG/ML
INJECTION, SOLUTION INTRAMUSCULAR; INTRAVENOUS PRN
Status: COMPLETED | OUTPATIENT
Start: 2024-03-14 | End: 2024-03-14

## 2024-03-14 RX ORDER — ASPIRIN 81 MG/1
81 TABLET, CHEWABLE ORAL DAILY
COMMUNITY

## 2024-03-14 RX ADMIN — SODIUM CHLORIDE 1000 ML: 9 INJECTION, SOLUTION INTRAVENOUS at 11:00

## 2024-03-14 RX ADMIN — FENTANYL CITRATE 50 MCG: 50 INJECTION, SOLUTION INTRAMUSCULAR; INTRAVENOUS at 10:58

## 2024-03-14 RX ADMIN — ACETAMINOPHEN 1000 MG: 325 TABLET ORAL at 14:10

## 2024-03-14 RX ADMIN — Medication 100 MCG: at 11:16

## 2024-03-14 RX ADMIN — HEPARIN SODIUM 2000 UNITS: 1000 INJECTION INTRAVENOUS; SUBCUTANEOUS at 11:14

## 2024-03-14 RX ADMIN — MIDAZOLAM HYDROCHLORIDE 1 MG: 1 INJECTION, SOLUTION INTRAMUSCULAR; INTRAVENOUS at 10:58

## 2024-03-14 RX ADMIN — LIDOCAINE 1 TUBE: 40 CREAM TOPICAL at 10:29

## 2024-03-14 RX ADMIN — LIDOCAINE HYDROCHLORIDE 2 ML: 10 INJECTION, SOLUTION EPIDURAL; INFILTRATION; INTRACAUDAL; PERINEURAL at 11:08

## 2024-03-14 RX ADMIN — HEPARIN SODIUM 2000 ML: 1000 INJECTION INTRAVENOUS; SUBCUTANEOUS at 11:16

## 2024-03-14 RX ADMIN — SODIUM BICARBONATE 0.5 MEQ: 42 INJECTION, SOLUTION INTRAVENOUS at 11:15

## 2024-03-14 RX ADMIN — IOPAMIDOL 117 ML: 612 INJECTION, SOLUTION INTRAVENOUS at 11:41

## 2024-03-14 RX ADMIN — NITROGLYCERIN 1 INCH: 20 OINTMENT TOPICAL at 10:29

## 2024-03-14 RX ADMIN — VERAPAMIL HYDROCHLORIDE 2.5 MG: 2.5 INJECTION, SOLUTION INTRAVENOUS at 11:15

## 2024-03-14 ASSESSMENT — PAIN DESCRIPTION - LOCATION: LOCATION: HEAD

## 2024-03-14 ASSESSMENT — PAIN SCALES - GENERAL: PAINLEVEL_OUTOF10: 7

## 2024-03-14 NOTE — PRE SEDATION
extended release capsule Take 1 capsule by mouth daily    Provider, MD Elda   albuterol sulfate HFA (PROVENTIL;VENTOLIN;PROAIR) 108 (90 Base) MCG/ACT inhaler Inhale 2 puffs into the lungs every 4 hours as needed  Patient not taking: Reported on 5/24/2023 7/18/19   Automatic Reconciliation, Ar   ALPRAZolam (XANAX) 1 MG tablet Take 1 tablet by mouth 3 times daily as needed.    Automatic Reconciliation, Ar   cyclobenzaprine (FLEXERIL) 10 MG tablet Take 10 mg by mouth 3 times daily as needed  Patient not taking: Reported on 5/24/2023 1/22/23   Automatic Reconciliation, Ar   pantoprazole (PROTONIX) 40 MG tablet Take 40 mg by mouth daily  Patient not taking: Reported on 5/24/2023    Automatic Reconciliation, Ar     Coumadin Use Last 7 Days:  no  Antiplatelet drug therapy use last 7 days: yes - asa  Other anticoagulant use last 7 days: no  Additional Medication Information:  diazepam      Pre-Sedation Documentation and Exam:   I have personally completed a history, physical exam & review of systems for this patient (see notes).  Vital signs have been reviewed (see flow sheet for vitals).  Lungs: clear to auscultation bilaterally without crackles or wheezing, Cardiovascular: regular rate and rhythm, no murmurs rubs or gallops.    Mallampati Airway Assessment:  normal neck range of motion, Mallampati Class I - (soft palate, fauces, uvula & anterior/posterior tonsillar pillars are visible), Edentulous    Prior History of Anesthesia Complications:   none    ASA Classification:  Class 2 - A normal healthy patient with mild systemic disease    Sedation/ Anesthesia Plan:   Per Dr. Meneses    Medications Planned:   Per Dr. Meneses    Patient is an appropriate candidate for plan of sedation: yes    Electronically signed by TORI Gonzalez NP on 3/14/2024 at 10:39 AM

## 2024-03-14 NOTE — DISCHARGE INSTRUCTIONS
today. YOU SHOULD NOT DRIVE FOR 24 HOURS, DO NOT OPERATE HEAVY MACHINERY, DO NOT MAKE ANY LEGAL DECISIONS OR SIGN LEGAL DOCUMENTS FOR 24 HOURS. DO NOT DRINK ALCOHOL, TAKE ANY MEDICATIONS UNLESS PRESCRIBED BY YOUR DOCTOR. IF YOU ARE A CAREGIVER, SOMEONE SHOULD TAKE THAT ROLE FOR 24 HOURS.       Side effects of sedation medications and other medications used today have been reviewed  Those side effects can include but are not limited to: dizziness, drowsiness, poor balance, fatigue, sleepiness. Take precautions at home to prevent falls, such as assistance with walking or stairs if allowed and /or when needed or position changes. Allergic or adverse reactions could include nausea, itching, hives, dizziness, or anything else out of the ordinary.       Should you experience any of these significant changes, please call [phone] between the hours of 7:30 am and 3:30 pm or [phone] after hours. After hours, ask the  to page the X-ray Technologist, and describe the problem to the technologist. If you are experiencing chest pain, shortness of breath, altered mental state, unusual bleeding or any other emergent symptom you should call [phone] immediately.

## 2024-03-14 NOTE — PROGRESS NOTES
Pt arrives ambulatory to angio department accompanied by daughter for diagnostic cerebral angiogram procedure. All assessments completed and consent was reviewed.  Education given was regarding procedure, moderate sedation, post-procedure care and  management/follow-up. Opportunity for questions was provided and all questions and concerns were addressed.

## 2024-03-14 NOTE — BRIEF OP NOTE
Neuro-Interventional Surgery - Brief procedure note      Patient: Andreea Aly MRN: [id number]     YOB: 1965  Age: 59 y.o.  Sex: female      Service: Neuro-Interventional Surgery    Date of Procedure: 3/14/2024    Pre-Procedure Diagnosis: Intracranial aneurysm    Post-Procedure Diagnosis: SAME    Procedure(s): Catheter cerebral/cervical arteriogram    Vessels selected: Right common carotid artery and Left common carotid artery    Brief Description of Procedure:  5 mm x 4 mm R MCA bifurcation aneurysm.    3 mm right supraclinoid ICA aneurysm.    Right radial artery puncture hemostasis using TR band.    Anesthesia:Moderate Sedation    Estimated Blood Loss:  10 ml.    Specimens:  None    Implants:  None    Apparent Intraoperative Complications:  None immediate    Patient Condition:  Stable    Disposition:  Same day recovery unit    Attestation:  I performed the procedure.        Signed By: Arturo Meneses MD     March 14, 2024     [facility] NEUROINTERVENTIONAL SURGERY

## 2024-03-14 NOTE — H&P
NeuroInterventional Surgery H&P  TORI Gonzalez - ISABELLE    Patient: Andreea Aly MRN: [id number]  SSN: [id number]    YOB: 1965  Age: 59 y.o.  Sex: female      FLAVIA Aly is a 59 y.o. female w/ pmh listed below who presents to [facility] on 3/14/24 for elective diagnostic cerebral aneurysm. Pt presented to [facility] on 23 reporting left side numbness and tingling x 2 days as well as left-side head tingling. Imaging at the time was negative for acute ischemic stroke, but CTAh-n revealed right bifurcation MCA and right supraclinoid ICA aneurysms, each about 3mm.  NIS was consulted for evaluation. Pt was seen in clinic by Dr. Meneses on 24 who recommended diagnostic cerebral angiogram to guide treatment options for non-ruptured cerebral aneurysms.    Subjective:   Pt seen lying in stretcher in no acute distress. She denies headache, weakness, vision changes, nausea, numbness or tingling, shortness of breath, and chest pain.    Past Medical History:   Diagnosis Date    Acute bronchitis 2015    Acute sinusitis     Anxiety     Arthritis     Back pain     Chest pain     Cocaine abuse with cocaine-induced disorder (HCC)     Delivery normal     x4    Dizziness     Fatigue     Gastrointestinal disorder     \"stomach ulcer\"    Other ill-defined conditions(799.89)     anemia    Palpitations     Peptic ulcer disease     Ringing in ears     Sciatica     Skipped beats     Snoring     Stroke (HCC)     TIA (transient ischemic attack)     Upper respiratory infection      Family History   Problem Relation Age of Onset    Stroke Mother     Heart Disease Mother     Lymphoma Mother     Lupus Sister     Lupus Other      Social History     Tobacco Use    Smoking status: Former     Current packs/day: 0.00     Types: Cigarettes     Quit date: 2016     Years since quittin.9    Smokeless tobacco: Never   Substance Use Topics    Alcohol use: No      Prior to Admission Medications   Prescriptions Last  comprehension and naming.    Cranial Nerves:   Pupils 3 mm, equal, round and reactive to light.     Visual fields full to confrontation.     Extraocular movements intact.        Facial sensation intact.     Full facial strength, no asymmetry.      Hearing grossly intact bilaterally.     No dysarthria. Tongue protrudes to midline, palate elevates symmetrically.      Shoulder shrug 5/5 bilaterally.       Motor:    No pronator drift.      Bulk and tone normal.      5/5 power in all extremities proximally and distally.     No involuntary movements.    Sensation:    Sensation intact throughout to light touch, temperature, pinprick, vibration, proprioception     Reflexes:    Reflexes are 2+ at the biceps, triceps, patella and achilles bilaterally. Negative Babinskis    Coordination & Gait: Normal. FTN and HTS intact with no ataxia present.    Labs:  Lab Results   Component Value Date/Time    WBC 6.1 2024 02:47 PM    HGB 12.9 2024 02:47 PM    HCT 40.2 2024 02:47 PM     2024 02:47 PM    MCV 85 2024 02:47 PM      Lab Results   Component Value Date/Time     2024 02:47 PM    K 4.1 2024 02:47 PM     2024 02:47 PM    CO2 26 2024 02:47 PM    BUN 10 2024 02:47 PM    GFRAA >60 2021 04:16 PM     Imagin23  CTH - No acute process  CTP - No perfusion abnormality seen  CTAh-n - Appearance of right MCA bifurcation aneurysm and right supraclinoid ICA aneurysm    23  MRI - No acute process    Assessment & Plan   Cerebral aneurysm, non-ruptured.  Plan to proceed w/ diagnostic cerebral angiogram.    Mallampati Airway Assessment:  normal, normal neck range of motion, Mallampati Class I - (soft palate, fauces, uvula & anterior/posterior tonsillar pillars are visible)    ASA Classification:  Class 2 - A normal healthy patient with mild systemic disease    Sedation/ Anesthesia Plan:   Sedation plan per Dr. Meneses    Patient is an appropriate  Home

## 2024-03-14 NOTE — PROGRESS NOTES
1400: Pt complaining of lightheadness and 7/10 dull frontal headache. Dr. Meneses notified. Orders received. Fluids still infusing.     1430: Pt states headache 4/10. Pt feels better. States lightheadedness improved. Fluids completed.

## 2024-03-20 ENCOUNTER — OFFICE VISIT (OUTPATIENT)
Age: 59
End: 2024-03-20
Payer: MEDICARE

## 2024-03-20 VITALS
SYSTOLIC BLOOD PRESSURE: 110 MMHG | WEIGHT: 280 LBS | TEMPERATURE: 97.8 F | DIASTOLIC BLOOD PRESSURE: 60 MMHG | HEART RATE: 78 BPM | BODY MASS INDEX: 35 KG/M2

## 2024-03-20 DIAGNOSIS — I67.1 CEREBRAL ANEURYSM: Primary | ICD-10-CM

## 2024-03-20 PROCEDURE — 1036F TOBACCO NON-USER: CPT | Performed by: RADIOLOGY

## 2024-03-20 PROCEDURE — G8427 DOCREV CUR MEDS BY ELIG CLIN: HCPCS | Performed by: RADIOLOGY

## 2024-03-20 PROCEDURE — 99214 OFFICE O/P EST MOD 30 MIN: CPT | Performed by: RADIOLOGY

## 2024-03-20 PROCEDURE — G8484 FLU IMMUNIZE NO ADMIN: HCPCS | Performed by: RADIOLOGY

## 2024-03-20 PROCEDURE — 3017F COLORECTAL CA SCREEN DOC REV: CPT | Performed by: RADIOLOGY

## 2024-03-20 PROCEDURE — G8417 CALC BMI ABV UP PARAM F/U: HCPCS | Performed by: RADIOLOGY

## 2024-03-28 ENCOUNTER — TELEPHONE (OUTPATIENT)
Age: 59
End: 2024-03-28

## 2024-03-28 DIAGNOSIS — I67.1 CEREBRAL ANEURYSM: Primary | ICD-10-CM

## 2024-03-28 NOTE — TELEPHONE ENCOUNTER
Patient called stating that when she saw Dr. Meneses last week on 3/20/24, Dr. Meneses told her that he would call her in one week to go over the plan for her treatment. (After he had discussed with some of his [facility] colleagues.)  She has not heard from him yet.    I told patient that Dr. Meneses is out until Monday, but I would send him a telephone message.

## 2024-04-02 ENCOUNTER — PATIENT MESSAGE (OUTPATIENT)
Age: 59
End: 2024-04-02

## 2024-04-02 DIAGNOSIS — I67.1 CEREBRAL ANEURYSM: Primary | ICD-10-CM

## 2024-04-02 RX ORDER — CLOPIDOGREL BISULFATE 75 MG/1
75 TABLET ORAL DAILY
Qty: 30 TABLET | Refills: 5 | Status: SHIPPED | OUTPATIENT
Start: 2024-04-02

## 2024-04-02 NOTE — TELEPHONE ENCOUNTER
Spoke to patient to confirm Stent assisted coil embolization on 4/25/2024 at [facility].   Arrival time is 8:30 am at Patient Registration.    You will be contacted by Preadmission Testing to schedule an appointment for labs, chest xray, ekg.    Reminders to patient:  -No eating or drinking after midnight the day prior to procedure.  -Take morning medications the morning of procedure with sips of water.   -Do not stop taking Blood Thinners if applicable unless notified by this office.  -You must have a  to drive you home upon discharge.  -Recommend you have someone with you for at least 24-48 hours post procedure.  -No metal of glue in hair.    Allergies reviewed.

## 2024-04-04 NOTE — ED NOTES
DC paperwork reviewed. ACE wrap to L ankle, pt educated on care/ precautions, Pt verbalize understanding, Pt ambulatory at time of DC, no distress noted.    Please clarify on what dose of Venlafaxine pt should start with. Message states start with 7 mg and then go down to 37.5 mg.    Do you mean start with 75 mg daily? Please advise.

## 2024-04-11 ENCOUNTER — TELEPHONE (OUTPATIENT)
Age: 59
End: 2024-04-11

## 2024-04-11 NOTE — TELEPHONE ENCOUNTER
[employer] has approved the [facility]IS procedure on 4/25/2024.    Authorization Number: [id number]

## 2024-04-12 ENCOUNTER — HOSPITAL ENCOUNTER (OUTPATIENT)
Facility: HOSPITAL | Age: 59
End: 2024-04-12
Payer: MEDICARE

## 2024-04-12 DIAGNOSIS — N95.0 POSTMENOPAUSAL BLEEDING: ICD-10-CM

## 2024-04-12 DIAGNOSIS — R10.2 PELVIC CRAMPING: ICD-10-CM

## 2024-04-12 PROCEDURE — 76856 US EXAM PELVIC COMPLETE: CPT

## 2024-04-12 PROCEDURE — 76830 TRANSVAGINAL US NON-OB: CPT

## 2024-04-16 ENCOUNTER — HOSPITAL ENCOUNTER (OUTPATIENT)
Facility: HOSPITAL | Age: 59
Discharge: HOME | End: 2024-04-19
Payer: MEDICARE

## 2024-04-16 VITALS
SYSTOLIC BLOOD PRESSURE: 111 MMHG | BODY MASS INDEX: 37.98 KG/M2 | WEIGHT: 280.4 LBS | HEIGHT: 72 IN | HEART RATE: 80 BPM | TEMPERATURE: 98.7 F | DIASTOLIC BLOOD PRESSURE: 71 MMHG

## 2024-04-16 LAB
ABO + RH BLD: NORMAL
ALBUMIN SERPL-MCNC: 3.6 G/DL (ref 3.5–5)
ALBUMIN/GLOB SERPL: 0.9 (ref 1.1–2.2)
ALP SERPL-CCNC: 129 U/L (ref 45–117)
ALT SERPL-CCNC: 21 U/L (ref 12–78)
ANION GAP SERPL CALC-SCNC: 4 MMOL/L (ref 5–15)
ASPIRIN: 350 ARU
AST SERPL-CCNC: 15 U/L (ref 15–37)
BASOPHILS # BLD: 0.1 K/UL (ref 0–0.1)
BASOPHILS NFR BLD: 2 % (ref 0–1)
BILIRUB SERPL-MCNC: 0.3 MG/DL (ref 0.2–1)
BLOOD GROUP ANTIBODIES SERPL: NORMAL
BUN SERPL-MCNC: 13 MG/DL (ref 6–20)
BUN/CREAT SERPL: 15 (ref 12–20)
CALCIUM SERPL-MCNC: 9.5 MG/DL (ref 8.5–10.1)
CHLORIDE SERPL-SCNC: 107 MMOL/L (ref 97–108)
CO2 SERPL-SCNC: 29 MMOL/L (ref 21–32)
CREAT SERPL-MCNC: 0.89 MG/DL (ref 0.55–1.02)
DIFFERENTIAL METHOD BLD: ABNORMAL
EKG ATRIAL RATE: 77 BPM
EKG DIAGNOSIS: NORMAL
EKG P AXIS: 60 DEGREES
EKG P-R INTERVAL: 182 MS
EKG Q-T INTERVAL: 386 MS
EKG QRS DURATION: 80 MS
EKG QTC CALCULATION (BAZETT): 436 MS
EKG R AXIS: -17 DEGREES
EKG T AXIS: 47 DEGREES
EKG VENTRICULAR RATE: 77 BPM
EOSINOPHIL # BLD: 0.3 K/UL (ref 0–0.4)
EOSINOPHIL NFR BLD: 5 % (ref 0–7)
ERYTHROCYTE [DISTWIDTH] IN BLOOD BY AUTOMATED COUNT: 13.7 % (ref 11.5–14.5)
GLOBULIN SER CALC-MCNC: 4.2 G/DL (ref 2–4)
GLUCOSE SERPL-MCNC: 94 MG/DL (ref 65–100)
HCT VFR BLD AUTO: 38.9 % (ref 35–47)
HGB BLD-MCNC: 12.4 G/DL (ref 11.5–16)
IMM GRANULOCYTES # BLD AUTO: 0 K/UL
IMM GRANULOCYTES NFR BLD AUTO: 0 %
LYMPHOCYTES # BLD: 2.9 K/UL (ref 0.8–3.5)
LYMPHOCYTES NFR BLD: 55 % (ref 12–49)
MCH RBC QN AUTO: 27 PG (ref 26–34)
MCHC RBC AUTO-ENTMCNC: 31.9 G/DL (ref 30–36.5)
MCV RBC AUTO: 84.6 FL (ref 80–99)
MONOCYTES # BLD: 0.5 K/UL (ref 0–1)
MONOCYTES NFR BLD: 9 % (ref 5–13)
NEUTS SEG # BLD: 1.6 K/UL (ref 1.8–8)
NEUTS SEG NFR BLD: 29 % (ref 32–75)
NRBC # BLD: 0 K/UL (ref 0–0.01)
NRBC BLD-RTO: 0 PER 100 WBC
P2Y12 PLT RESPONSE: 27 PRU (ref 194–418)
PLATELET # BLD AUTO: 314 K/UL (ref 150–400)
PMV BLD AUTO: 9.2 FL (ref 8.9–12.9)
POTASSIUM SERPL-SCNC: 3.5 MMOL/L (ref 3.5–5.1)
PROT SERPL-MCNC: 7.8 G/DL (ref 6.4–8.2)
RBC # BLD AUTO: 4.6 M/UL (ref 3.8–5.2)
RBC MORPH BLD: ABNORMAL
SODIUM SERPL-SCNC: 140 MMOL/L (ref 136–145)
SPECIMEN EXP DATE BLD: NORMAL
WBC # BLD AUTO: 5.4 K/UL (ref 3.6–11)
WBC MORPH BLD: ABNORMAL

## 2024-04-16 PROCEDURE — 86901 BLOOD TYPING SEROLOGIC RH(D): CPT

## 2024-04-16 PROCEDURE — 86900 BLOOD TYPING SEROLOGIC ABO: CPT

## 2024-04-16 PROCEDURE — 93005 ELECTROCARDIOGRAM TRACING: CPT | Performed by: RADIOLOGY

## 2024-04-16 PROCEDURE — 85576 BLOOD PLATELET AGGREGATION: CPT

## 2024-04-16 PROCEDURE — 86850 RBC ANTIBODY SCREEN: CPT

## 2024-04-16 PROCEDURE — 85025 COMPLETE CBC W/AUTO DIFF WBC: CPT

## 2024-04-16 PROCEDURE — 36415 COLL VENOUS BLD VENIPUNCTURE: CPT

## 2024-04-16 PROCEDURE — 80053 COMPREHEN METABOLIC PANEL: CPT

## 2024-04-16 RX ORDER — ASPIRIN 325 MG
325 TABLET ORAL DAILY
COMMUNITY

## 2024-04-16 RX ORDER — BUTALBITAL, ACETAMINOPHEN AND CAFFEINE 50; 325; 40 MG/1; MG/1; MG/1
1 TABLET ORAL EVERY 4 HOURS PRN
COMMUNITY

## 2024-04-16 RX ORDER — FAMOTIDINE 20 MG/1
20 TABLET, FILM COATED ORAL DAILY
COMMUNITY

## 2024-04-16 NOTE — PERIOP NOTE
CALLED TO CARDIOLOGY/DR. ROSENBERG/[phone] AND REQ ANY NOTES AND EKG TO BE FAXED TO PAT.  NOTES AND EKG FROM 2/2/22 REC'D AND PLACED ON CHART.    PT DENIES ANY PULMONARY DISEASES.  PT DOES NOT SEE A PULMONARY MD.  PT STATES SHE HAS AN ALBUTEROL INHALER FOR ALLERGY SEASON.

## 2024-04-16 NOTE — PERIOP NOTE
[facility]'S  PREOPERATIVE INSTRUCTIONS  Neuro Interventional Surgery    Surgery Date:   4/25/24     Your surgeon's office will call you to confirm day of surgery and arrival time. If you do not hear from them, please call [phone] to confirm day of surgery and arrival time.    Please report to [facility] Patient Access/Admitting on the 1st floor.  Bring your insurance card, photo identification, and any copayment ( if applicable).   If you are going home the same day of your surgery, you must have a responsible adult to drive you home. You need to have a responsible adult to stay with you the first 24 hours after surgery and you should not drive a car for 24 hours following your surgery.  Nothing to eat or drink after midnight the night before surgery. This includes no water, gum, mints, coffee, juice, etc.  Please note special instructions, if applicable, below for medications.  Do NOT drink alcohol or smoke 24 hours before surgery. STOP smoking for 14 days prior as it helps with breathing and healing after surgery.  If you are being admitted to the hospital, please leave personal belongings/luggage in your car until you have an assigned hospital room number.  Please wear comfortable clothes. Wear your glasses instead of contacts. We ask that all money, jewelry and valuables be left at home. Wear no make up, particularly mascara, the day of surgery.    All body piercings, rings, and jewelry need to be removed and left at home. Please remove any nail polish or artifical nails from your fingernails. Please wear your hair loose or down. Please no pony-tails, buns, or any metal hair accessories. If you shower the morning of surgery, please do not apply any lotions or powders afterwards.  You may wear deodorant. Do not shave any body area within 24 hours of your surgery.  Please follow all instructions to avoid any potential surgical cancellation.  Should your physical condition change, (i.e. fever, cold, flu,

## 2024-04-22 ENCOUNTER — TELEPHONE (OUTPATIENT)
Age: 59
End: 2024-04-22

## 2024-04-22 DIAGNOSIS — I67.1 CEREBRAL ANEURYSM: Primary | ICD-10-CM

## 2024-04-22 RX ORDER — CLOPIDOGREL BISULFATE 75 MG/1
37.5 TABLET ORAL DAILY
Qty: 30 TABLET | Refills: 1 | Status: SHIPPED | OUTPATIENT
Start: 2024-04-22

## 2024-04-22 NOTE — TELEPHONE ENCOUNTER
Spoke to patient and informed her of new order from provider.  Stop Plavix 75 mg daily.  Start Plavix 37.5 mg daily. Continue Aspirin 325 mg daily.  Recheck P2Y12 morning of procedure.  New order noted and escribed to pharmacy listed.  Patient stated understanding.

## 2024-04-24 ENCOUNTER — TELEPHONE (OUTPATIENT)
Age: 59
End: 2024-04-24

## 2024-04-24 NOTE — TELEPHONE ENCOUNTER
Spoke to patient to confirm procedure tomorrow.   Arrival time 7:00 am at Patient registration.  Reminder to patient:  -No eating or drinking after midnight the day prior to procedure.   -Take morning medications the morning of procedure with sips of water.   -Do not stop taking Blood Thinners if applicable unless notified by this office.  -You must have a  to drive you home upon discharge.  -Recommend you have someone with you for at least 24-48 hours post procedure.  -No metal of glue in hair.    Allergies reviewed.  Patient stated understanding.

## 2024-04-25 ENCOUNTER — HOSPITAL ENCOUNTER (INPATIENT)
Facility: HOSPITAL | Age: 59
LOS: 1 days | Discharge: HOME | End: 2024-04-26
Attending: RADIOLOGY | Admitting: RADIOLOGY
Payer: MEDICARE

## 2024-04-25 ENCOUNTER — ANESTHESIA EVENT (OUTPATIENT)
Facility: HOSPITAL | Age: 59
End: 2024-04-25
Payer: MEDICARE

## 2024-04-25 ENCOUNTER — ANESTHESIA (OUTPATIENT)
Facility: HOSPITAL | Age: 59
End: 2024-04-25
Payer: MEDICARE

## 2024-04-25 DIAGNOSIS — I67.1 CEREBRAL ANEURYSM: ICD-10-CM

## 2024-04-25 LAB
ASPIRIN: 594 ARU
P2Y12 PLT RESPONSE: 57 PRU (ref 194–418)

## 2024-04-25 PROCEDURE — 36223 PLACE CATH CAROTID/INOM ART: CPT

## 2024-04-25 PROCEDURE — 6360000002 HC RX W HCPCS: Performed by: NURSE PRACTITIONER

## 2024-04-25 PROCEDURE — 2580000003 HC RX 258: Performed by: NURSE PRACTITIONER

## 2024-04-25 PROCEDURE — 6360000002 HC RX W HCPCS: Performed by: NURSE ANESTHETIST, CERTIFIED REGISTERED

## 2024-04-25 PROCEDURE — C1769 GUIDE WIRE: HCPCS

## 2024-04-25 PROCEDURE — 6360000002 HC RX W HCPCS: Performed by: RADIOLOGY

## 2024-04-25 PROCEDURE — APPNB45 APP NON BILLABLE 31-45 MINUTES: Performed by: NURSE PRACTITIONER

## 2024-04-25 PROCEDURE — 2580000003 HC RX 258: Performed by: RADIOLOGY

## 2024-04-25 PROCEDURE — 6370000000 HC RX 637 (ALT 250 FOR IP): Performed by: RADIOLOGY

## 2024-04-25 PROCEDURE — 75898 FOLLOW-UP ANGIOGRAPHY: CPT | Performed by: RADIOLOGY

## 2024-04-25 PROCEDURE — 6370000000 HC RX 637 (ALT 250 FOR IP): Performed by: NURSE PRACTITIONER

## 2024-04-25 PROCEDURE — 2000000000 HC ICU R&B

## 2024-04-25 PROCEDURE — 85576 BLOOD PLATELET AGGREGATION: CPT

## 2024-04-25 PROCEDURE — 03LG3DZ OCCLUSION OF INTRACRANIAL ARTERY WITH INTRALUMINAL DEVICE, PERCUTANEOUS APPROACH: ICD-10-PCS | Performed by: RADIOLOGY

## 2024-04-25 PROCEDURE — 36224 PLACE CATH CAROTD ART: CPT | Performed by: RADIOLOGY

## 2024-04-25 PROCEDURE — 2500000003 HC RX 250 WO HCPCS: Performed by: RADIOLOGY

## 2024-04-25 PROCEDURE — 61624 TCAT PERM OCCLS/EMBOLJ CNS: CPT | Performed by: RADIOLOGY

## 2024-04-25 PROCEDURE — 2580000003 HC RX 258: Performed by: NURSE ANESTHETIST, CERTIFIED REGISTERED

## 2024-04-25 PROCEDURE — 75894 X-RAYS TRANSCATH THERAPY: CPT | Performed by: RADIOLOGY

## 2024-04-25 PROCEDURE — 2500000003 HC RX 250 WO HCPCS: Performed by: NURSE ANESTHETIST, CERTIFIED REGISTERED

## 2024-04-25 PROCEDURE — 6360000004 HC RX CONTRAST MEDICATION: Performed by: RADIOLOGY

## 2024-04-25 RX ORDER — POLYETHYLENE GLYCOL 3350 17 G/17G
17 POWDER, FOR SOLUTION ORAL DAILY PRN
Status: DISCONTINUED | OUTPATIENT
Start: 2024-04-25 | End: 2024-04-26 | Stop reason: HOSPADM

## 2024-04-25 RX ORDER — HEPARIN SODIUM 1000 [USP'U]/ML
INJECTION, SOLUTION INTRAVENOUS; SUBCUTANEOUS PRN
Status: COMPLETED | OUTPATIENT
Start: 2024-04-25 | End: 2024-04-25

## 2024-04-25 RX ORDER — MAGNESIUM SULFATE 1 G/100ML
1000 INJECTION INTRAVENOUS PRN
Status: CANCELLED | OUTPATIENT
Start: 2024-04-25

## 2024-04-25 RX ORDER — ACETAMINOPHEN 500 MG
1000 TABLET ORAL EVERY 6 HOURS PRN
Status: DISCONTINUED | OUTPATIENT
Start: 2024-04-25 | End: 2024-04-26 | Stop reason: HOSPADM

## 2024-04-25 RX ORDER — ACETAMINOPHEN 650 MG/1
650 SUPPOSITORY RECTAL EVERY 6 HOURS PRN
Status: DISCONTINUED | OUTPATIENT
Start: 2024-04-25 | End: 2024-04-26 | Stop reason: HOSPADM

## 2024-04-25 RX ORDER — SODIUM CHLORIDE 0.9 % (FLUSH) 0.9 %
5-40 SYRINGE (ML) INJECTION PRN
Status: DISCONTINUED | OUTPATIENT
Start: 2024-04-25 | End: 2024-04-26 | Stop reason: HOSPADM

## 2024-04-25 RX ORDER — LIDOCAINE 40 MG/G
CREAM TOPICAL PRN
Status: COMPLETED | OUTPATIENT
Start: 2024-04-25 | End: 2024-04-25

## 2024-04-25 RX ORDER — HEPARIN SODIUM 1000 [USP'U]/ML
INJECTION, SOLUTION INTRAVENOUS; SUBCUTANEOUS PRN
Status: DISCONTINUED | OUTPATIENT
Start: 2024-04-25 | End: 2024-04-25 | Stop reason: SDUPTHER

## 2024-04-25 RX ORDER — GLYCOPYRROLATE 0.2 MG/ML
INJECTION INTRAMUSCULAR; INTRAVENOUS PRN
Status: DISCONTINUED | OUTPATIENT
Start: 2024-04-25 | End: 2024-04-25 | Stop reason: SDUPTHER

## 2024-04-25 RX ORDER — SODIUM BICARBONATE 42 MG/ML
INJECTION, SOLUTION INTRAVENOUS PRN
Status: COMPLETED | OUTPATIENT
Start: 2024-04-25 | End: 2024-04-25

## 2024-04-25 RX ORDER — SUCCINYLCHOLINE/SOD CL,ISO/PF 200MG/10ML
SYRINGE (ML) INTRAVENOUS PRN
Status: DISCONTINUED | OUTPATIENT
Start: 2024-04-25 | End: 2024-04-25 | Stop reason: SDUPTHER

## 2024-04-25 RX ORDER — VERAPAMIL HYDROCHLORIDE 2.5 MG/ML
INJECTION, SOLUTION INTRAVENOUS PRN
Status: COMPLETED | OUTPATIENT
Start: 2024-04-25 | End: 2024-04-25

## 2024-04-25 RX ORDER — SODIUM CHLORIDE 9 MG/ML
INJECTION, SOLUTION INTRAVENOUS PRN
Status: DISCONTINUED | OUTPATIENT
Start: 2024-04-25 | End: 2024-04-26 | Stop reason: HOSPADM

## 2024-04-25 RX ORDER — SODIUM CHLORIDE 9 MG/ML
INJECTION, SOLUTION INTRAVENOUS CONTINUOUS
Status: DISCONTINUED | OUTPATIENT
Start: 2024-04-25 | End: 2024-04-26

## 2024-04-25 RX ORDER — ASPIRIN 325 MG
325 TABLET, DELAYED RELEASE (ENTERIC COATED) ORAL
Status: COMPLETED | OUTPATIENT
Start: 2024-04-25 | End: 2024-04-25

## 2024-04-25 RX ORDER — ONDANSETRON 4 MG/1
4 TABLET, ORALLY DISINTEGRATING ORAL EVERY 8 HOURS PRN
Status: CANCELLED | OUTPATIENT
Start: 2024-04-25

## 2024-04-25 RX ORDER — ONDANSETRON 4 MG/1
4 TABLET, ORALLY DISINTEGRATING ORAL EVERY 8 HOURS PRN
Status: DISCONTINUED | OUTPATIENT
Start: 2024-04-25 | End: 2024-04-26 | Stop reason: HOSPADM

## 2024-04-25 RX ORDER — SODIUM CHLORIDE 9 MG/ML
INJECTION, SOLUTION INTRAVENOUS CONTINUOUS PRN
Status: DISCONTINUED | OUTPATIENT
Start: 2024-04-25 | End: 2024-04-25 | Stop reason: SDUPTHER

## 2024-04-25 RX ORDER — SODIUM CHLORIDE 0.9 % (FLUSH) 0.9 %
5-40 SYRINGE (ML) INJECTION PRN
Status: CANCELLED | OUTPATIENT
Start: 2024-04-25

## 2024-04-25 RX ORDER — ASPIRIN 325 MG
325 TABLET ORAL DAILY
Status: DISCONTINUED | OUTPATIENT
Start: 2024-04-26 | End: 2024-04-26 | Stop reason: HOSPADM

## 2024-04-25 RX ORDER — ONDANSETRON 2 MG/ML
4 INJECTION INTRAMUSCULAR; INTRAVENOUS EVERY 6 HOURS PRN
Status: DISCONTINUED | OUTPATIENT
Start: 2024-04-25 | End: 2024-04-26 | Stop reason: HOSPADM

## 2024-04-25 RX ORDER — NEOSTIGMINE METHYLSULFATE 1 MG/ML
INJECTION, SOLUTION INTRAVENOUS PRN
Status: DISCONTINUED | OUTPATIENT
Start: 2024-04-25 | End: 2024-04-25 | Stop reason: SDUPTHER

## 2024-04-25 RX ORDER — LIDOCAINE HYDROCHLORIDE 20 MG/ML
INJECTION, SOLUTION EPIDURAL; INFILTRATION; INTRACAUDAL; PERINEURAL PRN
Status: DISCONTINUED | OUTPATIENT
Start: 2024-04-25 | End: 2024-04-25 | Stop reason: SDUPTHER

## 2024-04-25 RX ORDER — ONDANSETRON 2 MG/ML
4 INJECTION INTRAMUSCULAR; INTRAVENOUS EVERY 6 HOURS PRN
Status: CANCELLED | OUTPATIENT
Start: 2024-04-25

## 2024-04-25 RX ORDER — ROCURONIUM BROMIDE 10 MG/ML
INJECTION, SOLUTION INTRAVENOUS PRN
Status: DISCONTINUED | OUTPATIENT
Start: 2024-04-25 | End: 2024-04-25 | Stop reason: SDUPTHER

## 2024-04-25 RX ORDER — POTASSIUM CHLORIDE 7.45 MG/ML
10 INJECTION INTRAVENOUS PRN
Status: DISCONTINUED | OUTPATIENT
Start: 2024-04-25 | End: 2024-04-26 | Stop reason: HOSPADM

## 2024-04-25 RX ORDER — ACETAMINOPHEN 325 MG/1
650 TABLET ORAL EVERY 4 HOURS PRN
Status: CANCELLED | OUTPATIENT
Start: 2024-04-25

## 2024-04-25 RX ORDER — SODIUM CHLORIDE 0.9 % (FLUSH) 0.9 %
5-40 SYRINGE (ML) INJECTION EVERY 12 HOURS SCHEDULED
Status: CANCELLED | OUTPATIENT
Start: 2024-04-25

## 2024-04-25 RX ORDER — POTASSIUM CHLORIDE 750 MG/1
40 TABLET, FILM COATED, EXTENDED RELEASE ORAL PRN
Status: DISCONTINUED | OUTPATIENT
Start: 2024-04-25 | End: 2024-04-26 | Stop reason: HOSPADM

## 2024-04-25 RX ORDER — SODIUM CHLORIDE 9 MG/ML
INJECTION, SOLUTION INTRAVENOUS PRN
Status: CANCELLED | OUTPATIENT
Start: 2024-04-25

## 2024-04-25 RX ORDER — SODIUM CHLORIDE 0.9 % (FLUSH) 0.9 %
5-40 SYRINGE (ML) INJECTION EVERY 12 HOURS SCHEDULED
Status: DISCONTINUED | OUTPATIENT
Start: 2024-04-25 | End: 2024-04-26 | Stop reason: HOSPADM

## 2024-04-25 RX ORDER — CLOPIDOGREL BISULFATE 75 MG/1
37.5 TABLET ORAL DAILY
Status: DISCONTINUED | OUTPATIENT
Start: 2024-04-26 | End: 2024-04-26 | Stop reason: HOSPADM

## 2024-04-25 RX ORDER — MAGNESIUM SULFATE IN WATER 40 MG/ML
2000 INJECTION, SOLUTION INTRAVENOUS PRN
Status: DISCONTINUED | OUTPATIENT
Start: 2024-04-25 | End: 2024-04-26 | Stop reason: HOSPADM

## 2024-04-25 RX ORDER — NIMODIPINE 30 MG/1
60 CAPSULE, LIQUID FILLED ORAL
Status: CANCELLED | OUTPATIENT
Start: 2024-04-26 | End: 2024-05-16

## 2024-04-25 RX ADMIN — NITROGLYCERIN 1 INCH: 20 OINTMENT TOPICAL at 11:00

## 2024-04-25 RX ADMIN — LIDOCAINE 1 TUBE: 40 CREAM TOPICAL at 11:00

## 2024-04-25 RX ADMIN — IOPAMIDOL 64 ML: 612 INJECTION, SOLUTION INTRAVENOUS at 13:42

## 2024-04-25 RX ADMIN — ASPIRIN 325 MG: 325 TABLET, COATED ORAL at 20:07

## 2024-04-25 RX ADMIN — SODIUM CHLORIDE 10 MG/HR: 9 INJECTION, SOLUTION INTRAVENOUS at 13:55

## 2024-04-25 RX ADMIN — HEPARIN SODIUM 2000 UNITS: 1000 INJECTION INTRAVENOUS; SUBCUTANEOUS at 12:44

## 2024-04-25 RX ADMIN — SODIUM CHLORIDE: 9 INJECTION, SOLUTION INTRAVENOUS at 22:34

## 2024-04-25 RX ADMIN — SODIUM CHLORIDE: 9 INJECTION, SOLUTION INTRAVENOUS at 11:50

## 2024-04-25 RX ADMIN — LIDOCAINE HYDROCHLORIDE 80 MG: 20 INJECTION, SOLUTION EPIDURAL; INFILTRATION; INTRACAUDAL; PERINEURAL at 11:56

## 2024-04-25 RX ADMIN — ACETAMINOPHEN 1000 MG: 500 TABLET ORAL at 15:10

## 2024-04-25 RX ADMIN — SODIUM CHLORIDE: 9 INJECTION, SOLUTION INTRAVENOUS at 14:44

## 2024-04-25 RX ADMIN — PHENYLEPHRINE HYDROCHLORIDE 30 MCG/MIN: 10 INJECTION INTRAVENOUS at 12:52

## 2024-04-25 RX ADMIN — Medication 200 MG: at 11:56

## 2024-04-25 RX ADMIN — SODIUM BICARBONATE 0.5 MEQ: 42 INJECTION, SOLUTION INTRAVENOUS at 12:45

## 2024-04-25 RX ADMIN — Medication 100 MCG: at 12:44

## 2024-04-25 RX ADMIN — HEPARIN SODIUM 4000 UNITS: 1000 INJECTION, SOLUTION INTRAVENOUS; SUBCUTANEOUS at 12:42

## 2024-04-25 RX ADMIN — SODIUM CHLORIDE, PRESERVATIVE FREE 10 ML: 5 INJECTION INTRAVENOUS at 20:57

## 2024-04-25 RX ADMIN — PROPOFOL 25 MG: 10 INJECTION, EMULSION INTRAVENOUS at 12:59

## 2024-04-25 RX ADMIN — GLYCOPYRROLATE 0.4 MG: 0.2 INJECTION INTRAMUSCULAR; INTRAVENOUS at 13:51

## 2024-04-25 RX ADMIN — ROCURONIUM BROMIDE 10 MG: 10 SOLUTION INTRAVENOUS at 12:59

## 2024-04-25 RX ADMIN — PROPOFOL 200 MG: 10 INJECTION, EMULSION INTRAVENOUS at 11:56

## 2024-04-25 RX ADMIN — ACETAMINOPHEN 1000 MG: 500 TABLET ORAL at 20:56

## 2024-04-25 RX ADMIN — ROCURONIUM BROMIDE 10 MG: 10 SOLUTION INTRAVENOUS at 11:56

## 2024-04-25 RX ADMIN — VERAPAMIL HYDROCHLORIDE 2.5 MG: 2.5 INJECTION, SOLUTION INTRAVENOUS at 12:43

## 2024-04-25 RX ADMIN — HEPARIN SODIUM 5000 ML: 1000 INJECTION INTRAVENOUS; SUBCUTANEOUS at 13:43

## 2024-04-25 RX ADMIN — ROCURONIUM BROMIDE 30 MG: 10 SOLUTION INTRAVENOUS at 12:24

## 2024-04-25 RX ADMIN — PHENOL 1 SPRAY: 1.5 LIQUID ORAL at 19:31

## 2024-04-25 RX ADMIN — HEPARIN SODIUM 1000 UNITS: 1000 INJECTION, SOLUTION INTRAVENOUS; SUBCUTANEOUS at 13:35

## 2024-04-25 RX ADMIN — HEPARIN SODIUM 2000 UNITS: 1000 INJECTION, SOLUTION INTRAVENOUS; SUBCUTANEOUS at 12:57

## 2024-04-25 RX ADMIN — HEPARIN SODIUM 1000 UNITS: 1000 INJECTION, SOLUTION INTRAVENOUS; SUBCUTANEOUS at 13:16

## 2024-04-25 RX ADMIN — ONDANSETRON 4 MG: 2 INJECTION INTRAMUSCULAR; INTRAVENOUS at 13:46

## 2024-04-25 RX ADMIN — SODIUM CHLORIDE: 9 INJECTION, SOLUTION INTRAVENOUS at 12:44

## 2024-04-25 RX ADMIN — Medication 3 MG: at 13:51

## 2024-04-25 ASSESSMENT — PAIN SCALES - GENERAL
PAINLEVEL_OUTOF10: 3
PAINLEVEL_OUTOF10: 6
PAINLEVEL_OUTOF10: 2
PAINLEVEL_OUTOF10: 9
PAINLEVEL_OUTOF10: 9
PAINLEVEL_OUTOF10: 8
PAINLEVEL_OUTOF10: 8

## 2024-04-25 ASSESSMENT — PAIN DESCRIPTION - LOCATION
LOCATION: HEAD

## 2024-04-25 ASSESSMENT — PAIN DESCRIPTION - DESCRIPTORS
DESCRIPTORS: ACHING
DESCRIPTORS: ACHING

## 2024-04-25 NOTE — H&P
NeuroInterventional Surgery H&P  Josefina Bell, APRN - CNP    Patient: Andreea Aly MRN: [id number]     YOB: 1965  Age: 59 y.o.  Sex: female      Subjective:      Andreea Aly is a 59 y.o. female w/pmh of anxiety, arthritis, hypertension, hyperlipidemia, TIA, cocaine use, incidentally found right bifurcation MCA and right supraclinoid ICA aneurysms, each about 3 mm who presents for stent assisted coil embolization of the right bifurcation MCA aneurysm.     Past Medical History:   Diagnosis Date    Acute bronchitis 2015    Acute sinusitis     Anxiety     Arthritis     Back pain     Chest pain     Cocaine abuse with cocaine-induced disorder (HCC)     ONLY USED ONE TIME IN , NONE SINCE    Delivery normal     x4    Dizziness     Fatigue     Gastrointestinal disorder     \"stomach ulcer\"    History of blood transfusion     ABD. BLEEDING    Hyperlipidemia     NON COMPLIANT W/ MEDICATION    Hypertension     CHALINO on CPAP     Other ill-defined conditions(799.89)     anemia    Palpitations     Peptic ulcer disease     Ringing in ears     Sciatica     Skipped beats     Stroke (HCC)     TIA (transient ischemic attack) 2023     Family History   Problem Relation Age of Onset    Stroke Mother     Heart Disease Mother     Lymphoma Mother     Cerebral Aneurysm Mother     Diabetes Father     Lupus Sister     Lupus Other     Anesth Problems Neg Hx      Social History     Tobacco Use    Smoking status: Former     Current packs/day: 0.00     Types: Cigarettes     Quit date: 2016     Years since quittin.0    Smokeless tobacco: Never   Substance Use Topics    Alcohol use: No      Cannot display prior to admission medications because the patient has not been admitted in this contact.       Allergies   Allergen Reactions    Penicillins Anaphylaxis     Has taken cephalexin without problem    Hydrocodone Itching       Review of Systems:  Negative except for HPI     Objective:   There were no  procedure and the anesthesia methods. The patient and/or patient representative appear to understand and agree to proceed.     Proceed with diagnostic cerebral angiogram today with Dr. Meneses     Signed By: Josefina Bell, APRN - CNP     April 25, 2024       Patient discussed with attending physician Dr. Meneses

## 2024-04-25 NOTE — PROGRESS NOTES
TRANSFER - OUT REPORT:    Verbal report given to ARCHIE Garcia on Andreea Aly  being transferred to ICU 1 for routine progression of patient care       Report consisted of patient's Situation, Background, Assessment and   Recommendations(SBAR).     Information from the following report(s) Nurse Handoff Report was reviewed with the receiving nurse.           Lines:   Arterial Line 04/25/24 Radial (Active)        Opportunity for questions and clarification was provided.      Patient transported with:  Monitor, O2 @ 4lpm, and Registered Nurse  CRNA

## 2024-04-25 NOTE — PROGRESS NOTES
Neurocritical Care Brief Progress Note:  Briefly, patient is a 59 year old female who presents for elective stent assisted coil embolization of the right middle cerebral artery bifurcation aneurysm, by Dr. Meneses.     Procedure went well with successful detachable coil embolization of the right middle cerebral artery bifurcation aneurysm without immediate complications. Patient intubated w/general anesthesia for procedure.     Post op she notes a 9/10 headache and states that both of her legs feel heavy. Postanesthesia shivering noted. Able to wiggle toes, minimal effort given when asked to lift BLE.       Physical Exam:  Gen: NAD, calm, cooperative  Neuro: AAOx4. Following commands. Speech clear. Naming and repetition intact. EOMI. VFF. Face symmetric. Tongue midline. DENIA formal strength testing as patient is groggy from anesthesia. BUE 3/5, BLE 2/5, wiggles toes. No involuntary movements.   Skin: Warm, dry, color appropriate for ethnicity. Right TR band intact, no bloody drainage.       Plan:  - Continue DAPT   - -160   - Assays to be checked at 1600     ADDENDUM   Patient re assessed at 1540.  AAOx4, following commands. Naming and repetition intact. EOMI. VFF. Face symmetric. BUE 3/5, BLE 3/5. Sensation intact throughout to light touch. No ataxia.     Continue current plan per TORI Yu - CNP  Neurocritical Care Nurse Practitioner

## 2024-04-25 NOTE — ANESTHESIA PRE PROCEDURE
BLEPHOROPLASTY    GYN      tubal ligation    HEENT      sinus surgery    CA UNLISTED PROCEDURE ABDOMEN PERITONEUM & OMENTUM      FOR BLEEDING ULCER    TONSILLECTOMY         Social History:    Social History     Tobacco Use    Smoking status: Former     Current packs/day: 0.00     Types: Cigarettes     Quit date: 2016     Years since quittin.0    Smokeless tobacco: Never   Substance Use Topics    Alcohol use: No                                Counseling given: Not Answered      Vital Signs (Current): There were no vitals filed for this visit.                                           BP Readings from Last 3 Encounters:   24 132/86   24 111/71   24 110/60       NPO Status:                                                                                 BMI:   Wt Readings from Last 3 Encounters:   24 122.5 kg (270 lb)   24 127.2 kg (280 lb 6.4 oz)   24 127 kg (280 lb)     There is no height or weight on file to calculate BMI.    CBC:   Lab Results   Component Value Date/Time    WBC 5.4 2024 03:38 PM    RBC 4.60 2024 03:38 PM    HGB 12.4 2024 03:38 PM    HCT 38.9 2024 03:38 PM    MCV 84.6 2024 03:38 PM    RDW 13.7 2024 03:38 PM     2024 03:38 PM       CMP:   Lab Results   Component Value Date/Time     2024 03:38 PM    K 3.5 2024 03:38 PM     2024 03:38 PM    CO2 29 2024 03:38 PM    BUN 13 2024 03:38 PM    CREATININE 0.89 2024 03:38 PM    GFRAA >60 2021 04:16 PM    AGRATIO 0.9 2024 03:38 PM    AGRATIO 1.2 2024 02:47 PM    LABGLOM 74 2024 03:38 PM    LABGLOM 88 2024 02:47 PM    GLUCOSE 94 2024 03:38 PM    GLUCOSE 85 2024 02:47 PM    PROT 7.8 2024 03:38 PM    CALCIUM 9.5 2024 03:38 PM    BILITOT 0.3 2024 03:38 PM    ALKPHOS 129 2024 03:38 PM    ALKPHOS 143 2024 02:47 PM    AST 15 2024 03:38 PM    ALT 21

## 2024-04-25 NOTE — ANESTHESIA PROCEDURE NOTES
Arterial Line:    An arterial line was placed using surface landmarks, in the pre-op for the following indication(s): continuous blood pressure monitoring and blood sampling needed.    A 20 gauge (size) (length), Arrow (type) catheter was placed, Seldinger technique used, into the left radial artery, secured by Tegaderm.  Anesthesia type: Local  Local infiltration: Injection    Events:  greater than 3 attempts and patient tolerated procedure well with no complications.4/25/2024 12:10 PM4/25/2024 12:22 PM  Anesthesiologist: Mark Moreira MD  Performed: Anesthesiologist   Preanesthetic Checklist  Completed: patient identified, IV checked, site marked, risks and benefits discussed, surgical/procedural consents, equipment checked, pre-op evaluation, timeout performed, anesthesia consent given, oxygen available, monitors applied/VS acknowledged and fire risk safety assessment completed and verbalized

## 2024-04-25 NOTE — ANESTHESIA POSTPROCEDURE EVALUATION
Department of Anesthesiology  Postprocedure Note    Patient: Andreea Aly  MRN: [id number]  YOB: 1965  Date of evaluation: 4/25/2024    Procedure Summary       Date: 04/25/24 Room / Location: [facility] ANGIO IR    Anesthesia Start: 1150 Anesthesia Stop: 1416    Procedure: IR ARCH UNI CAR CERV CEREBRAL Diagnosis:       Cerebral aneurysm      Cerebral aneurysm    Scheduled Providers: Agus Moran Jr., MD Responsible Provider: Agus Moran Jr., MD    Anesthesia Type: General ASA Status: 3            Anesthesia Type: General    Aniceto Phase I:      Aniceto Phase II:      Anesthesia Post Evaluation    Patient location during evaluation: PACU  Patient participation: complete - patient participated  Level of consciousness: awake  Pain score: 2  Airway patency: patent  Nausea & Vomiting: no nausea  Cardiovascular status: blood pressure returned to baseline and hemodynamically stable  Respiratory status: acceptable  Hydration status: stable  Multimodal analgesia pain management approach    No notable events documented.

## 2024-04-25 NOTE — PROGRESS NOTES
Neurocritical Care Brief Progress Note:  P2y12 therapeutic at 57. Aspirin assay subtherapeutic at 594. Will give x 1 dose of 325 mg aspirin and recheck assay in the morning.     Shantel Huertas [facility]-NP  Neurocritical Care Nurse Practitioner  [phone]

## 2024-04-25 NOTE — BRIEF OP NOTE
Neuro-Interventional Surgery - Brief procedure note      Patient: Andreea Aly MRN: [id number]     YOB: 1965  Age: 59 y.o.  Sex: female      Service: Neuro-Interventional Surgery    Date of Procedure: 4/25/2024    Pre-Procedure Diagnosis: Intracranial aneurysm    Post-Procedure Diagnosis: SAME    Procedure(s): Stent assisted detachable coil embolization of right middle cerebral artery bifurcation aneurysm    Vessels selected: Right common carotid artery, Right internal carotid artery, and Right middle meningeal artery    Brief Description of Procedure: Successful detachable coil embolization of the right middle cerebral artery bifurcation aneurysm performed without immediate complications.    Right radial artery puncture site hemostasis achieved using TR band. No immediate complications. No hematoma or oozing noted.  Distal hand perfusion remain unchanged post hemostasis.  Sterile dressing applied over the puncture site.    Anesthesia:General    Estimated Blood Loss:  20 ml.    Specimens:  None    Implants:     3 mm x 21 mm [place] stent    3.5 mm x 6 cm [place] tetra coil  2 mm x 3.5 cm [place] tetra coil    Apparent Intraoperative Complications:  None immediate    Patient Condition:  Stable    Disposition:  Intensive care unit    Attestation:  I performed the procedure.        Signed By: Arturo Meneses MD     April 25, 2024     [facility] NEUROINTERVENTIONAL SURGERY

## 2024-04-26 VITALS
SYSTOLIC BLOOD PRESSURE: 106 MMHG | OXYGEN SATURATION: 95 % | TEMPERATURE: 98 F | HEART RATE: 62 BPM | DIASTOLIC BLOOD PRESSURE: 71 MMHG | RESPIRATION RATE: 13 BRPM

## 2024-04-26 LAB
ANION GAP SERPL CALC-SCNC: 6 MMOL/L (ref 5–15)
ASPIRIN: 562 ARU
BUN SERPL-MCNC: 7 MG/DL (ref 6–20)
BUN/CREAT SERPL: 9 (ref 12–20)
CALCIUM SERPL-MCNC: 8.3 MG/DL (ref 8.5–10.1)
CHLORIDE SERPL-SCNC: 113 MMOL/L (ref 97–108)
CO2 SERPL-SCNC: 25 MMOL/L (ref 21–32)
CREAT SERPL-MCNC: 0.76 MG/DL (ref 0.55–1.02)
ERYTHROCYTE [DISTWIDTH] IN BLOOD BY AUTOMATED COUNT: 13.9 % (ref 11.5–14.5)
GLUCOSE SERPL-MCNC: 88 MG/DL (ref 65–100)
HCT VFR BLD AUTO: 31.2 % (ref 35–47)
HGB BLD-MCNC: 10.2 G/DL (ref 11.5–16)
MAGNESIUM SERPL-MCNC: 1.8 MG/DL (ref 1.6–2.4)
MCH RBC QN AUTO: 27.5 PG (ref 26–34)
MCHC RBC AUTO-ENTMCNC: 32.7 G/DL (ref 30–36.5)
MCV RBC AUTO: 84.1 FL (ref 80–99)
NRBC # BLD: 0 K/UL (ref 0–0.01)
NRBC BLD-RTO: 0 PER 100 WBC
PHOSPHATE SERPL-MCNC: 3.3 MG/DL (ref 2.6–4.7)
PLATELET # BLD AUTO: 232 K/UL (ref 150–400)
PMV BLD AUTO: 8.9 FL (ref 8.9–12.9)
POTASSIUM SERPL-SCNC: 3.4 MMOL/L (ref 3.5–5.1)
RBC # BLD AUTO: 3.71 M/UL (ref 3.8–5.2)
SODIUM SERPL-SCNC: 144 MMOL/L (ref 136–145)
WBC # BLD AUTO: 5.2 K/UL (ref 3.6–11)

## 2024-04-26 PROCEDURE — 80048 BASIC METABOLIC PNL TOTAL CA: CPT

## 2024-04-26 PROCEDURE — 2580000003 HC RX 258: Performed by: NURSE PRACTITIONER

## 2024-04-26 PROCEDURE — 85576 BLOOD PLATELET AGGREGATION: CPT

## 2024-04-26 PROCEDURE — 36415 COLL VENOUS BLD VENIPUNCTURE: CPT

## 2024-04-26 PROCEDURE — 85027 COMPLETE CBC AUTOMATED: CPT

## 2024-04-26 PROCEDURE — 6370000000 HC RX 637 (ALT 250 FOR IP): Performed by: NURSE PRACTITIONER

## 2024-04-26 PROCEDURE — 84100 ASSAY OF PHOSPHORUS: CPT

## 2024-04-26 PROCEDURE — 83735 ASSAY OF MAGNESIUM: CPT

## 2024-04-26 PROCEDURE — 99239 HOSP IP/OBS DSCHRG MGMT >30: CPT

## 2024-04-26 PROCEDURE — 6370000000 HC RX 637 (ALT 250 FOR IP)

## 2024-04-26 RX ORDER — ASPIRIN 325 MG
325 TABLET ORAL DAILY
Status: DISCONTINUED | OUTPATIENT
Start: 2024-04-26 | End: 2024-04-26

## 2024-04-26 RX ORDER — ASPIRIN 325 MG
325 TABLET ORAL ONCE
Status: COMPLETED | OUTPATIENT
Start: 2024-04-26 | End: 2024-04-26

## 2024-04-26 RX ADMIN — ASPIRIN 325 MG: 325 TABLET ORAL at 08:10

## 2024-04-26 RX ADMIN — POTASSIUM CHLORIDE 40 MEQ: 750 TABLET, FILM COATED, EXTENDED RELEASE ORAL at 05:34

## 2024-04-26 RX ADMIN — ASPIRIN 325 MG: 325 TABLET ORAL at 09:14

## 2024-04-26 RX ADMIN — PHENOL 1 SPRAY: 1.5 LIQUID ORAL at 08:07

## 2024-04-26 RX ADMIN — ACETAMINOPHEN 1000 MG: 500 TABLET ORAL at 09:15

## 2024-04-26 RX ADMIN — SODIUM CHLORIDE, PRESERVATIVE FREE 10 ML: 5 INJECTION INTRAVENOUS at 08:10

## 2024-04-26 RX ADMIN — CLOPIDOGREL BISULFATE 37.5 MG: 75 TABLET ORAL at 08:08

## 2024-04-26 RX ADMIN — ACETAMINOPHEN 1000 MG: 500 TABLET ORAL at 03:30

## 2024-04-26 ASSESSMENT — PAIN SCALES - GENERAL
PAINLEVEL_OUTOF10: 4
PAINLEVEL_OUTOF10: 7
PAINLEVEL_OUTOF10: 0
PAINLEVEL_OUTOF10: 7

## 2024-04-26 ASSESSMENT — PAIN DESCRIPTION - LOCATION: LOCATION: HEAD

## 2024-04-26 ASSESSMENT — PAIN DESCRIPTION - DESCRIPTORS: DESCRIPTORS: ACHING

## 2024-04-26 ASSESSMENT — PAIN DESCRIPTION - ORIENTATION: ORIENTATION: RIGHT

## 2024-04-26 NOTE — DISCHARGE INSTR - COC
Continuity of Care Form    Patient Name: Andreea Aly   :  1965  MRN:  [id number]    Admit date:  2024  Discharge date:  ***    Code Status Order: Full Code   Advance Directives:     Admitting Physician:  Arturo Meneses MD  PCP: Olman Billy MD    Discharging Nurse: ***  Discharging Hospital Unit/Room#: 7101/01  Discharging Unit Phone Number: ***    Emergency Contact:   Extended Emergency Contact Information  Primary Emergency Contact: Natan Hartmann   [facility]  Home Phone: [phone]  Mobile Phone: [phone]  Relation: Child  Secondary Emergency Contact: Mauricio Steward  Home Phone: [phone]  Mobile Phone: [phone]  Relation: Child  Preferred language: English   needed? No    Past Surgical History:  Past Surgical History:   Procedure Laterality Date    COLONOSCOPY      ENDOSCOPY, COLON, DIAGNOSTIC      EYE SURGERY Bilateral     BLEPHOROPLASTY    GYN      tubal ligation    HEENT      sinus surgery    RI UNLISTED PROCEDURE ABDOMEN PERITONEUM & OMENTUM      FOR BLEEDING ULCER    TONSILLECTOMY         Immunization History:     There is no immunization history on file for this patient.    Active Problems:  Patient Active Problem List   Diagnosis Code    Cocaine abuse ([facility]) F14.10    Chest pain, unspecified R07.9    Abnormality of gait and mobility R26.9    Anxiety F41.9    Weakness R53.1    Acute stroke due to ischemia ([facility]) I63.9    Transient ischemic attack involving right internal carotid artery G45.1    Bilateral carotid artery stenosis I65.23    Cerebral aneurysm, nonruptured I67.1    Left sided numbness R20.0    Cerebral aneurysm I67.1       Isolation/Infection:   Isolation            No Isolation          Patient Infection Status       None to display            Nurse Assessment:  Last Vital Signs: /68   Pulse 64   Temp 98.4 °F (36.9 °C) (Oral)   Resp 19   SpO2 98%     Last documented pain score (0-10 scale): Pain Level: 7  Last Weight:

## 2024-04-26 NOTE — PROGRESS NOTES
Neurocritical Care Brief Progress Note:  Pt is s/p stent assisted detachable coil embolization of right middle cerebral artery bifurcation aneurysm with Dr. Meneses. Right radial arteriotomy site with minimal oozing and bruising. No hematoma. No tenderness. +distal pulse.     Pt c/o A-line site tenderness. Noted minimal swelling. Plan to dc A-line. Discussed with primary nurse. Prn tylenol 1000 mg given for headache    Addendum @ 05:00  Repeat asa assay borderline at 562. Will continue with scheduled morning dose aspirin of 325 mg. Morning labs with mild hypokalemia 3.4. will replete. Hg dropped 2 points suspect likely from post surgery vs hemodilution. Will dc IVF. Possible dc home today.   Review of Systems:   Admits 5/10 pressure headache to right temporal otherwise denied any visual, ear nose, throat problems; no coughing or wheezing or shortness of breath, No chest pain or orthopnea, no abdominal pain, nausea or vomiting, No pain in the body or extremities, no psychiatric, neurological, endocrine, hematological or cardiac complaints except as noted above.   Physical Exam:   Blood pressure (!) 105/55, pulse 75, temperature 98.3 °F (36.8 °C), temperature source Oral, resp. rate 14, SpO2 97 %. Net IO Since Admission: 2,255.37 mL [04/25/24 2333]    GEN: Cooperative, Calm, well developed, well nourished patient in NAD  HEENT: Normocephalic. Non-icter, no congestion, loose teeth  Lungs: Diminished bilaterally ant, non-labored breathing, RA  Cardiac: S1,S2, normal rate and rhythm, with no murmurs. no gallops  Abdomen: hypoactive bowel sounds, no distention, soft, non-tender  Extremities: 2+ Radial pulses, no clubbing, cyanosis, or edema  Skin: no rashes or lesions noted      NEURO:  Mental status: oriented to time, situation, place and person. Able to follow commands appropriately  Cranial Nerves:  II-XII intact; Attention and fund of knowledge is appropriate/intact. Speech/Language is intact/fluent. Normal recent and

## 2024-04-26 NOTE — DISCHARGE INSTR - DIET

## 2024-04-26 NOTE — DISCHARGE SUMMARY
Neurointerventional Surgery Discharge Summary  [address]  [address]  [place] [address]  [phone]    Patient: Andreea Aly MRN: [id number]  SSN: [id number]    YOB: 1965  Age: 59 y.o.  Sex: female      DATE OF ADMISSION: 4/25/2024    DATE OF DISCHARGE: 04/26/24     ADMITTING PROVIDER: Arturo Meneses MD    DISCHARGING PROVIDER: TORI Gonzalez NP    PROCEDURES/SURGERIES: Stent assisted detachable coil embolization of right middle cerebral artery bifurcation aneurysm     OFFICE NUMBER: [phone] , you can reach the on call physician 24/7 even during non-business hours     ADMITTING DIAGNOSES & HOSPITAL COURSE:     ICD-10-CM    1. Cerebral aneurysm  I67.1 IR ARCH UNI CAR CERV CEREBRAL     IR ARCH UNI CAR CERV CEREBRAL        Pt was admitted for scheduled elective cerebral angiogram and stent assisted detachable coil embolization of right middle cerebral artery bifurcation aneurysm  by Dr. Meneses. The procedure was performed in the Interventional Radiology suite under general anesthesia. The patient tolerated the procedure well without complications. Following extubation, patient was transferred to ICU, fully recovered and returned to neurological baseline. The patient was admitted overnight for continued monitoring due to potential risks of stroke and thrombosis. There were no neurological events overnight. The hospital course was uneventful and the patient was appropriate for discharge home today in stable condition.    Patient Vitals for the past 12 hrs:   Temp Pulse Resp BP SpO2   04/26/24 0800 98.4 °F (36.9 °C) 71 21 -- 98 %   04/26/24 0700 -- 71 11 -- 97 %   04/26/24 0600 -- 62 15 -- 96 %   04/26/24 0500 -- 70 19 -- 97 %   04/26/24 0400 97.8 °F (36.6 °C) 59 14 (!) 112/57 98 %   04/26/24 0300 -- 54 12 -- 98 %   04/26/24 0200 -- 61 13 -- 93 %   04/26/24 0100 -- 60 13 -- 93 %   04/26/24 0000 98.4 °F (36.9 °C) 69 20 (!) 111/54 94 %   04/25/24 2300 -- 75 16 -- 96 %   04/25/24 2200  -- 75 14 -- 97 %   04/25/24 2100 -- 75 17 -- 98 %     Physical Exam:  GENERAL: NAD, calm, cooperative  HEART: RRR  LUNGS: CTAB  SKIN: Warm, dry, color appropriate for ethnicity. Arteriotomy access site soft and non-tender on palpation, dressing is C/D/I, with no active bleeding or drainage noted.     Neurologic Exam:  Mental Status:  Alert and oriented x 4.  Appropriate affect, mood and behavior.       Language:    Normal fluency, repetition, comprehension and naming. Follows commands    Cranial Nerves:   Pupils 3 mm bilaterally, equal, round and briskly reactive to light.     Visual fields full to confrontation.     Extraocular movements intact.       Facial sensation intact.     Full facial strength, no asymmetry.      Hearing intact bilaterally.     No dysarthria. Tongue protrudes to midline, palate elevates symmetrically.      Shoulder shrug 5/5 bilaterally.    Motor:    No pronator drift.      Bulk and tone normal.      5/5 power in all extremities proximally and distally.     No involuntary movements.    Sensation:    Sensation intact throughout to light touch.    Coordination & Gait: Gait deferred. FTN and HTS intact with no ataxia present.    DIET:   Normal diet    PLAN:  Discharge home with self care.    FOLLOW UP APPOINTMENTS:   1.  Follow up in the [facility] clinic with Dr. Meneses on 5/29/24.  2   Please make an appointment to follow up with PCP as needed.     ACTIVITY:   Do not lift anything over 10 lbs for two weeks. Try to limit going up and down stairs as much as possible. Rest and hydrate. May resume all physical activities as tolerated after 2 weeks.     WOUND CARE:   Monitor for signs and sx of infection including fever, expanding inflammation and discolored drainage. Report any changes in temperature in affected extremity, numbness, weakness or hematoma.  If you experience any increased bruising or bleeding at your puncture site either outside or under the skin please apply direct, firm pressure for 20

## 2024-04-29 ENCOUNTER — TELEPHONE (OUTPATIENT)
Age: 59
End: 2024-04-29

## 2024-04-29 NOTE — TELEPHONE ENCOUNTER
Patient LVM stating that she was spitting up bright red blood.    Message was immediately given to Ngoc Hayes

## 2024-04-29 NOTE — TELEPHONE ENCOUNTER
Spoke to patient who stated she vomited bright red blood about 30 minutes prior to call.  Patient denies headaches, dizziness, numbness or tingling or nausea.  Patient advised to go to nearest ED.  Patient stated her daughter took her to [place]. Advised patient again to go to ED for evaluation.  Patient stated understanding.  Advised patient to have ED staff call our office if needed.

## 2024-04-30 NOTE — TELEPHONE ENCOUNTER
Spoke to patient who was at the ED in [place].  Patient stated she was told the vomiting of blood was from intubation not from the Cerebral aneurysm.  Patient had a CTA and was told everything was fine.  Patient remains on Plavix and Aspirin.

## 2024-05-29 ENCOUNTER — OFFICE VISIT (OUTPATIENT)
Age: 59
End: 2024-05-29
Payer: MEDICARE

## 2024-05-29 VITALS
OXYGEN SATURATION: 97 % | TEMPERATURE: 97.4 F | DIASTOLIC BLOOD PRESSURE: 80 MMHG | WEIGHT: 277 LBS | SYSTOLIC BLOOD PRESSURE: 120 MMHG | BODY MASS INDEX: 37.52 KG/M2 | HEIGHT: 72 IN

## 2024-05-29 DIAGNOSIS — I67.1 CEREBRAL ANEURYSM, NONRUPTURED: Primary | ICD-10-CM

## 2024-05-29 PROCEDURE — 1036F TOBACCO NON-USER: CPT | Performed by: RADIOLOGY

## 2024-05-29 PROCEDURE — G8427 DOCREV CUR MEDS BY ELIG CLIN: HCPCS | Performed by: RADIOLOGY

## 2024-05-29 PROCEDURE — 99213 OFFICE O/P EST LOW 20 MIN: CPT | Performed by: RADIOLOGY

## 2024-05-29 PROCEDURE — G8417 CALC BMI ABV UP PARAM F/U: HCPCS | Performed by: RADIOLOGY

## 2024-05-29 PROCEDURE — 3017F COLORECTAL CA SCREEN DOC REV: CPT | Performed by: RADIOLOGY

## 2024-06-18 PROBLEM — Z72.0 TOBACCO USER: Status: ACTIVE | Noted: 2024-06-18

## 2024-06-18 PROBLEM — B86 INFESTATION BY SARCOPTES SCABIEI VAR HOMINIS: Status: ACTIVE | Noted: 2024-06-18

## 2024-06-18 PROBLEM — R10.9 FLANK PAIN: Status: ACTIVE | Noted: 2024-06-18

## 2024-06-18 PROBLEM — M79.10 MUSCLE PAIN: Status: ACTIVE | Noted: 2024-06-18

## 2024-06-18 PROBLEM — M79.603 PAIN IN UPPER LIMB: Status: ACTIVE | Noted: 2024-06-18

## 2024-06-18 PROBLEM — R09.89 CARDIOVASCULAR SYMPTOMS: Status: ACTIVE | Noted: 2024-06-18

## 2024-06-18 PROBLEM — K21.9 GASTROESOPHAGEAL REFLUX DISEASE: Status: ACTIVE | Noted: 2024-06-18

## 2024-06-18 PROBLEM — J32.9 SINUSITIS: Status: ACTIVE | Noted: 2024-06-18

## 2024-06-18 PROBLEM — M15.9 GENERALIZED OSTEOARTHRITIS: Status: ACTIVE | Noted: 2024-06-18

## 2024-06-18 PROBLEM — E55.9 VITAMIN D DEFICIENCY: Status: ACTIVE | Noted: 2024-06-18

## 2024-06-18 PROBLEM — B07.0 VERRUCA PLANTARIS: Status: ACTIVE | Noted: 2024-06-18

## 2024-06-18 PROBLEM — M19.91 LOCALIZED, PRIMARY OSTEOARTHRITIS: Status: ACTIVE | Noted: 2024-06-18

## 2024-06-18 PROBLEM — R06.2 WHEEZING: Status: ACTIVE | Noted: 2024-06-18

## 2024-06-18 PROBLEM — J01.90 ACUTE SINUSITIS: Status: ACTIVE | Noted: 2024-06-18

## 2024-06-18 PROBLEM — M79.673 FOOT PAIN: Status: ACTIVE | Noted: 2024-06-18

## 2024-06-18 PROBLEM — B35.3 TINEA PEDIS: Status: ACTIVE | Noted: 2024-06-18

## 2024-06-18 PROBLEM — H93.19 TINNITUS: Status: ACTIVE | Noted: 2024-06-18

## 2024-06-18 PROBLEM — E66.9 OBESITY WITH BODY MASS INDEX 30 OR GREATER: Status: ACTIVE | Noted: 2024-06-18

## 2024-06-18 PROBLEM — J02.9 PHARYNGITIS: Status: ACTIVE | Noted: 2024-06-18

## 2024-06-18 PROBLEM — B35.1 ONYCHOMYCOSIS OF TOENAIL: Status: ACTIVE | Noted: 2024-06-18

## 2024-06-18 PROBLEM — J06.9 UPPER RESPIRATORY INFECTION: Status: ACTIVE | Noted: 2024-06-18

## 2024-06-18 PROBLEM — G89.4 CHRONIC PAIN DISORDER: Status: ACTIVE | Noted: 2024-06-18

## 2024-06-18 PROBLEM — J31.0 RHINITIS: Status: ACTIVE | Noted: 2024-06-18

## 2024-06-18 PROBLEM — M54.9 BACKACHE: Status: ACTIVE | Noted: 2024-06-18

## 2024-06-18 PROBLEM — R42 DIZZINESS: Status: ACTIVE | Noted: 2024-06-18

## 2024-07-18 PROBLEM — J32.9 SINUSITIS: Status: RESOLVED | Noted: 2024-06-18 | Resolved: 2024-07-18

## 2024-07-21 ENCOUNTER — HOSPITAL ENCOUNTER (EMERGENCY)
Facility: HOSPITAL | Age: 59
Discharge: HOME | End: 2024-07-21
Attending: EMERGENCY MEDICINE
Payer: MEDICARE

## 2024-07-21 VITALS
WEIGHT: 281.31 LBS | BODY MASS INDEX: 38.1 KG/M2 | SYSTOLIC BLOOD PRESSURE: 154 MMHG | HEIGHT: 72 IN | RESPIRATION RATE: 16 BRPM | OXYGEN SATURATION: 96 % | DIASTOLIC BLOOD PRESSURE: 92 MMHG | HEART RATE: 76 BPM | TEMPERATURE: 98.1 F

## 2024-07-21 DIAGNOSIS — H10.31 ACUTE CONJUNCTIVITIS OF RIGHT EYE, UNSPECIFIED ACUTE CONJUNCTIVITIS TYPE: Primary | ICD-10-CM

## 2024-07-21 PROCEDURE — 6370000000 HC RX 637 (ALT 250 FOR IP): Performed by: EMERGENCY MEDICINE

## 2024-07-21 PROCEDURE — 99283 EMERGENCY DEPT VISIT LOW MDM: CPT

## 2024-07-21 RX ORDER — MOXIFLOXACIN 5 MG/ML
1 SOLUTION/ DROPS OPHTHALMIC 3 TIMES DAILY
Qty: 3 ML | Refills: 0 | Status: SHIPPED | OUTPATIENT
Start: 2024-07-21 | End: 2024-07-28

## 2024-07-21 RX ADMIN — FLUORESCEIN SODIUM 1 MG: 1 STRIP OPHTHALMIC at 10:44

## 2024-07-21 NOTE — ED PROVIDER NOTES
[facility] EMERGENCY DEPT  EMERGENCY DEPARTMENT ENCOUNTER      Pt Name: Andreea Aly  MRN: [id number]  Birthdate 1965  Date of evaluation: 7/21/2024  Provider: Drew Bryson MD    CHIEF COMPLAINT       Chief Complaint   Patient presents with    Eye Pain         HISTORY OF PRESENT ILLNESS   (Location/Symptom, Timing/Onset, Context/Setting, Quality, Duration, Modifying Factors, Severity)  Note limiting factors.   HPI      59 year-old female presented with right eye itching, redness, and drainage that began yesterday and worsened this morning. The patient has a history of multiple medical problems including bronchitis, sinusitis, anxiety, asthma, Baker's cyst, cocaine abuse, dizziness, fractures, stomach ulcer, history of blood transfusion, hyperlipidemia, hypertension, obstructive sleep apnea, palpitations, ringing in the ears, sciatica, and stroke. The patient has allergies to penicillin and hydrocodone. She reports blurred vision but does not wear contacts and has not been swimming or left the house recently. The patient experienced discomfort when closing the eye but no other pain. She underwent brain surgery on April 25th for an aneurysm with coil placement but does not believe the current symptoms are related to the procedure.       Review of External Medical Records:     Nursing Notes were reviewed.    REVIEW OF SYSTEMS    (2-9 systems for level 4, 10 or more for level 5)     Review of Systems    Except as noted above the remainder of the review of systems was reviewed and negative.       PAST MEDICAL HISTORY     Past Medical History:   Diagnosis Date    Acute bronchitis Jan 2015    Acute sinusitis     Anxiety     Arthritis     Asthma     Back pain     Hernandez's cyst 06/10/2024    Chest pain     Cocaine abuse with cocaine-induced disorder (HCC)     ONLY USED ONE TIME IN 2012, NONE SINCE    Delivery normal     x4    Dizziness     Fatigue     Fractures     Gastrointestinal disorder     \"stomach ulcer\"

## 2024-07-21 NOTE — ED TRIAGE NOTES
Pt arrives with the c.c. of right eye itching and redness with drainage that has been going on since yesterday and got worse with this morning.

## 2024-08-13 ENCOUNTER — TELEPHONE (OUTPATIENT)
Age: 59
End: 2024-08-13

## 2024-08-13 DIAGNOSIS — I67.1 CEREBRAL ANEURYSM: ICD-10-CM

## 2024-08-13 RX ORDER — CLOPIDOGREL BISULFATE 75 MG/1
37.5 TABLET ORAL DAILY
Qty: 30 TABLET | Refills: 1 | Status: SHIPPED | OUTPATIENT
Start: 2024-08-13

## 2024-08-13 NOTE — TELEPHONE ENCOUNTER
Per patient, [facility] will not fill her Plavix.   Patient states that she was told to take a full tablet instead of the .5.  She is out of this medication.

## 2024-08-21 ENCOUNTER — OFFICE VISIT (OUTPATIENT)
Age: 59
End: 2024-08-21
Payer: MEDICARE

## 2024-08-21 ENCOUNTER — HOSPITAL ENCOUNTER (OUTPATIENT)
Facility: HOSPITAL | Age: 59
Setting detail: SPECIMEN
Discharge: HOME | End: 2024-08-24
Payer: MEDICARE

## 2024-08-21 VITALS
HEIGHT: 72 IN | HEART RATE: 65 BPM | WEIGHT: 262 LBS | TEMPERATURE: 97.5 F | DIASTOLIC BLOOD PRESSURE: 92 MMHG | OXYGEN SATURATION: 95 % | SYSTOLIC BLOOD PRESSURE: 130 MMHG | BODY MASS INDEX: 35.49 KG/M2

## 2024-08-21 DIAGNOSIS — I67.1 CEREBRAL ANEURYSM: Primary | ICD-10-CM

## 2024-08-21 LAB
ASPIRIN: 556 ARU
COMMENT:: NORMAL
P2Y12 PLT RESPONSE: 8 PRU (ref 194–418)
SPECIMEN HOLD: NORMAL

## 2024-08-21 PROCEDURE — G8417 CALC BMI ABV UP PARAM F/U: HCPCS | Performed by: RADIOLOGY

## 2024-08-21 PROCEDURE — G8427 DOCREV CUR MEDS BY ELIG CLIN: HCPCS | Performed by: RADIOLOGY

## 2024-08-21 PROCEDURE — 3017F COLORECTAL CA SCREEN DOC REV: CPT | Performed by: RADIOLOGY

## 2024-08-21 PROCEDURE — 99214 OFFICE O/P EST MOD 30 MIN: CPT | Performed by: RADIOLOGY

## 2024-08-21 PROCEDURE — 1036F TOBACCO NON-USER: CPT | Performed by: RADIOLOGY

## 2024-08-21 NOTE — PROGRESS NOTES
Neurointerventional Surgery Clinic Note        Patient: Andreea Aly MRN: [id number]  SSN: [id number]    YOB: 1965  Age: 59 y.o.  Sex: female      Subjective:      Andreea Aly is a 59 y.o. female who is being seen for follow-up after undergoing right middle cerebral cerebral artery bifurcation aneurysm stent assisted coiling in April 2024.  Patient also has a non- branch point right ventral  intracranial ICA aneurysm which will need to be treated in the near future.    Past Medical History:   Diagnosis Date    Acute bronchitis Jan 2015    Acute sinusitis     Anxiety     Arthritis     Asthma     Back pain     Hernandez's cyst 06/10/2024    Chest pain     Cocaine abuse with cocaine-induced disorder (HCC)     ONLY USED ONE TIME IN 2012, NONE SINCE    Delivery normal     x4    Dizziness     Fatigue     Fractures     Gastrointestinal disorder     \"stomach ulcer\"    History of blood transfusion 2007    ABD. BLEEDING    Hyperlipidemia     NON COMPLIANT W/ MEDICATION    Hypertension     CHALINO on CPAP     Osteoarthritis     Other ill-defined conditions(799.89)     anemia    Palpitations     Peptic ulcer disease     Ringing in ears     Sciatica     Skipped beats     Stroke (HCC)     TIA (transient ischemic attack) 04/21/2023     Past Surgical History:   Procedure Laterality Date    COLONOSCOPY      ENDOSCOPY, COLON, DIAGNOSTIC      EYE SURGERY Bilateral     BLEPHOROPLASTY    CO UNLISTED PROCEDURE ABDOMEN PERITONEUM & OMENTUM      FOR BLEEDING ULCER    SINUS SURGERY      TONSILLECTOMY      TUBAL LIGATION        Family History   Problem Relation Age of Onset    Stroke Mother     Heart Disease Mother     Lymphoma Mother     Cerebral Aneurysm Mother     Severe Sprains Mother     Diabetes Father     Lupus Sister     Lupus Other     Anesth Problems Neg Hx      Social History     Tobacco Use    Smoking status: Former     Current packs/day: 0.00     Types: Cigarettes     Quit date: 4/2/2016     Years since quitting:

## 2024-08-21 NOTE — PATIENT INSTRUCTIONS
You will have Pipeline Embolization on 9/17/2024 at [facility].   Arrival time is 8:30 am at Patient Registration.  You will be contacted by Preadmission Testing to schedule an appointment for labs, chest xray, ekg.  Reminders to patient:  -No eating or drinking after midnight the day prior to procedure.  -Take morning medications the morning of procedure with sips of water.   -Do not stop taking Blood Thinners if applicable unless notified by this office.  -You must have a  to drive you home upon discharge.  -Recommend you have someone with you for at least 24-48 hours post procedure.  -No metal of glue in hair.

## 2024-08-21 NOTE — PROGRESS NOTES
Follow up for Cerebral aneurysm and imaging review.  Patient reports nose bleed with blood clots on Sunday.  Denies headaches, dizziness, numbness or tingling, blurred or double vision.

## 2024-08-21 NOTE — H&P (VIEW-ONLY)
Neurointerventional Surgery Clinic Note        Patient: Andreea Aly MRN: [id number]  SSN: [id number]    YOB: 1965  Age: 59 y.o.  Sex: female      Subjective:      Andreea Aly is a 59 y.o. female who is being seen for follow-up after undergoing right middle cerebral cerebral artery bifurcation aneurysm stent assisted coiling in April 2024.  Patient also has a non- branch point right ventral  intracranial ICA aneurysm which will need to be treated in the near future.    Past Medical History:   Diagnosis Date    Acute bronchitis Jan 2015    Acute sinusitis     Anxiety     Arthritis     Asthma     Back pain     Hernandez's cyst 06/10/2024    Chest pain     Cocaine abuse with cocaine-induced disorder (HCC)     ONLY USED ONE TIME IN 2012, NONE SINCE    Delivery normal     x4    Dizziness     Fatigue     Fractures     Gastrointestinal disorder     \"stomach ulcer\"    History of blood transfusion 2007    ABD. BLEEDING    Hyperlipidemia     NON COMPLIANT W/ MEDICATION    Hypertension     CHALINO on CPAP     Osteoarthritis     Other ill-defined conditions(799.89)     anemia    Palpitations     Peptic ulcer disease     Ringing in ears     Sciatica     Skipped beats     Stroke (HCC)     TIA (transient ischemic attack) 04/21/2023     Past Surgical History:   Procedure Laterality Date    COLONOSCOPY      ENDOSCOPY, COLON, DIAGNOSTIC      EYE SURGERY Bilateral     BLEPHOROPLASTY    [place] UNLISTED PROCEDURE ABDOMEN PERITONEUM & OMENTUM      FOR BLEEDING ULCER    SINUS SURGERY      TONSILLECTOMY      TUBAL LIGATION        Family History   Problem Relation Age of Onset    Stroke Mother     Heart Disease Mother     Lymphoma Mother     Cerebral Aneurysm Mother     Severe Sprains Mother     Diabetes Father     Lupus Sister     Lupus Other     Anesth Problems Neg Hx      Social History     Tobacco Use    Smoking status: Former     Current packs/day: 0.00     Types: Cigarettes     Quit date: 4/2/2016     Years since quitting:  cerebral hemorrhage, kidney damage from contrast dye, focal radiation reaction/hair loss. Other risks include distal hemorrhages in the vascular territory that harbors the aneurysm, delayed or failure of opening of the flow diverter, off-label use, and complications related to dual antiplatelet therapy.    After considering the risks and benefits of such a procedure and carefully and comprehensively contrasting this to the options of conservative care the patient decided to go ahead and schedule the procedure which we will perform electively.      The patient gave informed consent..     Plan:     Aspirin and Plavix assays today    Flow diversion stent embolization of supraclinoid right ICA aneurysm.    Thank you for allowing me to participate in the care of this patient.    Greater than 35 minutes were spent in patient management, greater than half of which was spent in counseling and coordination of care.      Arturo Meneses MD

## 2024-08-23 ENCOUNTER — TELEPHONE (OUTPATIENT)
Age: 59
End: 2024-08-23

## 2024-08-23 DIAGNOSIS — I67.1 CEREBRAL ANEURYSM: Primary | ICD-10-CM

## 2024-08-23 RX ORDER — ASPIRIN 81 MG/1
81 TABLET ORAL DAILY
Qty: 30 TABLET | Refills: 5 | Status: SHIPPED | OUTPATIENT
Start: 2024-08-23

## 2024-08-23 NOTE — TELEPHONE ENCOUNTER
Patient called wanting to know if any changes need to be made to her antiplatelet therapy.   Patient states that she had her Aspirin and P2Y12 done a couple of days ago and was to receive a call regarding results.

## 2024-08-23 NOTE — TELEPHONE ENCOUNTER
Spoke to provider regarding P2y12 result.  New order from provider to change to Brilinta 60 mg BID, Aspirin 81 mg daily.  Spoke to patient and informed her of changes in medication.  Informed patient to stop Plavix 0.5 mg daily and Aspirin 325 mg daily.  Start Brilinta 60 mg BID and Aspirin 81 mg daily.  Patient asked to have medication sent to [facility] on [address].

## 2024-09-03 ENCOUNTER — HOSPITAL ENCOUNTER (OUTPATIENT)
Facility: HOSPITAL | Age: 59
Discharge: HOME | End: 2024-09-06
Payer: MEDICARE

## 2024-09-03 VITALS
BODY MASS INDEX: 35.62 KG/M2 | SYSTOLIC BLOOD PRESSURE: 116 MMHG | OXYGEN SATURATION: 97 % | WEIGHT: 263 LBS | HEIGHT: 72 IN | TEMPERATURE: 98.3 F | DIASTOLIC BLOOD PRESSURE: 77 MMHG | HEART RATE: 76 BPM

## 2024-09-03 LAB
ABO + RH BLD: NORMAL
ALBUMIN SERPL-MCNC: 3.5 G/DL (ref 3.5–5)
ALBUMIN/GLOB SERPL: 0.9 (ref 1.1–2.2)
ALP SERPL-CCNC: 106 U/L (ref 45–117)
ALT SERPL-CCNC: 21 U/L (ref 12–78)
ANION GAP SERPL CALC-SCNC: 2 MMOL/L (ref 5–15)
ASPIRIN: 350 ARU
AST SERPL-CCNC: 15 U/L (ref 15–37)
BASOPHILS # BLD: 0.1 K/UL (ref 0–0.1)
BASOPHILS NFR BLD: 1 % (ref 0–1)
BILIRUB SERPL-MCNC: 0.5 MG/DL (ref 0.2–1)
BLOOD GROUP ANTIBODIES SERPL: NORMAL
BUN SERPL-MCNC: 11 MG/DL (ref 6–20)
BUN/CREAT SERPL: 12 (ref 12–20)
CALCIUM SERPL-MCNC: 9.5 MG/DL (ref 8.5–10.1)
CHLORIDE SERPL-SCNC: 109 MMOL/L (ref 97–108)
CO2 SERPL-SCNC: 30 MMOL/L (ref 21–32)
CREAT SERPL-MCNC: 0.93 MG/DL (ref 0.55–1.02)
DIFFERENTIAL METHOD BLD: ABNORMAL
EOSINOPHIL # BLD: 0.2 K/UL (ref 0–0.4)
EOSINOPHIL NFR BLD: 3 % (ref 0–7)
ERYTHROCYTE [DISTWIDTH] IN BLOOD BY AUTOMATED COUNT: 15.6 % (ref 11.5–14.5)
GLOBULIN SER CALC-MCNC: 4 G/DL (ref 2–4)
GLUCOSE SERPL-MCNC: 92 MG/DL (ref 65–100)
HCT VFR BLD AUTO: 37.7 % (ref 35–47)
HGB BLD-MCNC: 12 G/DL (ref 11.5–16)
IMM GRANULOCYTES # BLD AUTO: 0 K/UL (ref 0–0.04)
IMM GRANULOCYTES NFR BLD AUTO: 0 % (ref 0–0.5)
LYMPHOCYTES # BLD: 1.8 K/UL (ref 0.8–3.5)
LYMPHOCYTES NFR BLD: 31 % (ref 12–49)
MCH RBC QN AUTO: 26.5 PG (ref 26–34)
MCHC RBC AUTO-ENTMCNC: 31.8 G/DL (ref 30–36.5)
MCV RBC AUTO: 83.4 FL (ref 80–99)
MONOCYTES # BLD: 0.5 K/UL (ref 0–1)
MONOCYTES NFR BLD: 8 % (ref 5–13)
NEUTS SEG # BLD: 3.4 K/UL (ref 1.8–8)
NEUTS SEG NFR BLD: 57 % (ref 32–75)
NRBC # BLD: 0 K/UL (ref 0–0.01)
NRBC BLD-RTO: 0 PER 100 WBC
P2Y12 PLT RESPONSE: 8 PRU (ref 194–418)
PLATELET # BLD AUTO: 314 K/UL (ref 150–400)
PMV BLD AUTO: 9.2 FL (ref 8.9–12.9)
POTASSIUM SERPL-SCNC: 3.9 MMOL/L (ref 3.5–5.1)
PROT SERPL-MCNC: 7.5 G/DL (ref 6.4–8.2)
RBC # BLD AUTO: 4.52 M/UL (ref 3.8–5.2)
SODIUM SERPL-SCNC: 141 MMOL/L (ref 136–145)
SPECIMEN EXP DATE BLD: NORMAL
WBC # BLD AUTO: 5.9 K/UL (ref 3.6–11)

## 2024-09-03 PROCEDURE — 85576 BLOOD PLATELET AGGREGATION: CPT

## 2024-09-03 PROCEDURE — 80053 COMPREHEN METABOLIC PANEL: CPT

## 2024-09-03 PROCEDURE — 36415 COLL VENOUS BLD VENIPUNCTURE: CPT

## 2024-09-03 PROCEDURE — 86850 RBC ANTIBODY SCREEN: CPT

## 2024-09-03 PROCEDURE — 85025 COMPLETE CBC W/AUTO DIFF WBC: CPT

## 2024-09-03 PROCEDURE — 86900 BLOOD TYPING SEROLOGIC ABO: CPT

## 2024-09-03 PROCEDURE — 86901 BLOOD TYPING SEROLOGIC RH(D): CPT

## 2024-09-03 RX ORDER — FLUTICASONE PROPIONATE AND SALMETEROL XINAFOATE 45; 21 UG/1; UG/1
2 AEROSOL, METERED RESPIRATORY (INHALATION) 2 TIMES DAILY
COMMUNITY

## 2024-09-03 RX ORDER — PANTOPRAZOLE SODIUM 20 MG/1
20 TABLET, DELAYED RELEASE ORAL DAILY
COMMUNITY

## 2024-09-03 ASSESSMENT — PAIN DESCRIPTION - LOCATION: LOCATION: KNEE

## 2024-09-03 ASSESSMENT — PAIN - FUNCTIONAL ASSESSMENT: PAIN_FUNCTIONAL_ASSESSMENT: PREVENTS OR INTERFERES SOME ACTIVE ACTIVITIES AND ADLS

## 2024-09-03 ASSESSMENT — PAIN SCALES - GENERAL: PAINLEVEL_OUTOF10: 7

## 2024-09-03 ASSESSMENT — PAIN DESCRIPTION - ORIENTATION: ORIENTATION: LEFT

## 2024-09-03 ASSESSMENT — PAIN DESCRIPTION - DESCRIPTORS: DESCRIPTORS: ACHING

## 2024-09-04 ENCOUNTER — TELEPHONE (OUTPATIENT)
Age: 59
End: 2024-09-04

## 2024-09-04 DIAGNOSIS — I67.1 CEREBRAL ANEURYSM: Primary | ICD-10-CM

## 2024-09-04 NOTE — TELEPHONE ENCOUNTER
Test: Flow Diversion Stent Embo  Insurance: [facility] Dual Complete  Submission Type: Phone  Reference #: [id number]  Approval Range: 9/17/2024 - 9/18/2024      Additional Notes:   Patient NIS procedure on 9/17/2024 has been APPROVED

## 2024-09-09 ENCOUNTER — TELEPHONE (OUTPATIENT)
Age: 59
End: 2024-09-09

## 2024-09-13 ENCOUNTER — TELEPHONE (OUTPATIENT)
Age: 59
End: 2024-09-13

## 2024-09-17 ENCOUNTER — ANESTHESIA (OUTPATIENT)
Facility: HOSPITAL | Age: 59
DRG: 039 | End: 2024-09-17
Payer: MEDICARE

## 2024-09-17 ENCOUNTER — APPOINTMENT (OUTPATIENT)
Facility: HOSPITAL | Age: 59
DRG: 039 | End: 2024-09-17
Attending: RADIOLOGY
Payer: MEDICARE

## 2024-09-17 ENCOUNTER — ANESTHESIA EVENT (OUTPATIENT)
Facility: HOSPITAL | Age: 59
DRG: 039 | End: 2024-09-17
Payer: MEDICARE

## 2024-09-17 ENCOUNTER — HOSPITAL ENCOUNTER (INPATIENT)
Facility: HOSPITAL | Age: 59
LOS: 2 days | Discharge: HOME | DRG: 039 | End: 2024-09-19
Attending: RADIOLOGY | Admitting: RADIOLOGY
Payer: MEDICARE

## 2024-09-17 DIAGNOSIS — I67.1 CEREBRAL ANEURYSM: ICD-10-CM

## 2024-09-17 LAB
ACT BLD: 146 SECS (ref 79–138)
ACT BLD: 232 SECS (ref 79–138)
ACT BLD: 238 SECS (ref 79–138)
ACT BLD: 275 SECS (ref 79–138)
ASPIRIN: 570 ARU
P2Y12 PLT RESPONSE: 7 PRU (ref 194–418)

## 2024-09-17 PROCEDURE — 70450 CT HEAD/BRAIN W/O DYE: CPT

## 2024-09-17 PROCEDURE — 36620 INSERTION CATHETER ARTERY: CPT

## 2024-09-17 PROCEDURE — 6360000004 HC RX CONTRAST MEDICATION: Performed by: RADIOLOGY

## 2024-09-17 PROCEDURE — 6370000000 HC RX 637 (ALT 250 FOR IP): Performed by: NURSE PRACTITIONER

## 2024-09-17 PROCEDURE — 2580000003 HC RX 258

## 2024-09-17 PROCEDURE — 6360000002 HC RX W HCPCS

## 2024-09-17 PROCEDURE — 6360000002 HC RX W HCPCS: Performed by: ANESTHESIOLOGY

## 2024-09-17 PROCEDURE — 2709999900 IR ARCH UNI CAR CERV CEREBRAL

## 2024-09-17 PROCEDURE — 2500000003 HC RX 250 WO HCPCS

## 2024-09-17 PROCEDURE — 6360000002 HC RX W HCPCS: Performed by: NURSE PRACTITIONER

## 2024-09-17 PROCEDURE — 6360000002 HC RX W HCPCS: Performed by: RADIOLOGY

## 2024-09-17 PROCEDURE — 2580000003 HC RX 258: Performed by: NURSE PRACTITIONER

## 2024-09-17 PROCEDURE — 75898 FOLLOW-UP ANGIOGRAPHY: CPT | Performed by: RADIOLOGY

## 2024-09-17 PROCEDURE — 85576 BLOOD PLATELET AGGREGATION: CPT

## 2024-09-17 PROCEDURE — 36415 COLL VENOUS BLD VENIPUNCTURE: CPT

## 2024-09-17 PROCEDURE — 2500000003 HC RX 250 WO HCPCS: Performed by: RADIOLOGY

## 2024-09-17 PROCEDURE — 03VK3HZ RESTRICTION OF RIGHT INTERNAL CAROTID ARTERY WITH INTRALUMINAL DEVICE, FLOW DIVERTER, PERCUTANEOUS APPROACH: ICD-10-PCS

## 2024-09-17 PROCEDURE — 85347 COAGULATION TIME ACTIVATED: CPT

## 2024-09-17 PROCEDURE — 2000000000 HC ICU R&B

## 2024-09-17 PROCEDURE — 36224 PLACE CATH CAROTD ART: CPT

## 2024-09-17 PROCEDURE — 61624 TCAT PERM OCCLS/EMBOLJ CNS: CPT | Performed by: RADIOLOGY

## 2024-09-17 PROCEDURE — 03VK3HZ RESTRICTION OF RIGHT INTERNAL CAROTID ARTERY WITH INTRALUMINAL DEVICE, FLOW DIVERTER, PERCUTANEOUS APPROACH: ICD-10-PCS | Performed by: RADIOLOGY

## 2024-09-17 PROCEDURE — APPNB60 APP NON BILLABLE TIME 46-60 MINS: Performed by: NURSE PRACTITIONER

## 2024-09-17 PROCEDURE — 94640 AIRWAY INHALATION TREATMENT: CPT

## 2024-09-17 PROCEDURE — 75894 X-RAYS TRANSCATH THERAPY: CPT | Performed by: RADIOLOGY

## 2024-09-17 PROCEDURE — 36228 PLACE CATH INTRACRANIAL ART: CPT

## 2024-09-17 PROCEDURE — 2580000003 HC RX 258: Performed by: RADIOLOGY

## 2024-09-17 RX ORDER — PANTOPRAZOLE SODIUM 40 MG/1
40 TABLET, DELAYED RELEASE ORAL
Status: DISCONTINUED | OUTPATIENT
Start: 2024-09-18 | End: 2024-09-19 | Stop reason: HOSPADM

## 2024-09-17 RX ORDER — DILTIAZEM HYDROCHLORIDE 120 MG/1
120 CAPSULE, COATED, EXTENDED RELEASE ORAL DAILY
Status: DISCONTINUED | OUTPATIENT
Start: 2024-09-18 | End: 2024-09-19 | Stop reason: HOSPADM

## 2024-09-17 RX ORDER — ASPIRIN 81 MG/1
81 TABLET ORAL DAILY
Status: DISCONTINUED | OUTPATIENT
Start: 2024-09-18 | End: 2024-09-19 | Stop reason: HOSPADM

## 2024-09-17 RX ORDER — SODIUM CHLORIDE 9 MG/ML
INJECTION, SOLUTION INTRAVENOUS
Status: DISCONTINUED | OUTPATIENT
Start: 2024-09-17 | End: 2024-09-17 | Stop reason: SDUPTHER

## 2024-09-17 RX ORDER — DEXAMETHASONE SODIUM PHOSPHATE 4 MG/ML
INJECTION, SOLUTION INTRA-ARTICULAR; INTRALESIONAL; INTRAMUSCULAR; INTRAVENOUS; SOFT TISSUE
Status: DISCONTINUED | OUTPATIENT
Start: 2024-09-17 | End: 2024-09-17 | Stop reason: SDUPTHER

## 2024-09-17 RX ORDER — PROPOFOL 10 MG/ML
INJECTION, EMULSION INTRAVENOUS
Status: DISCONTINUED | OUTPATIENT
Start: 2024-09-17 | End: 2024-09-17 | Stop reason: SDUPTHER

## 2024-09-17 RX ORDER — NITROGLYCERIN 20 MG/100ML
INJECTION INTRAVENOUS PRN
Status: COMPLETED | OUTPATIENT
Start: 2024-09-17 | End: 2024-09-17

## 2024-09-17 RX ORDER — VERAPAMIL HYDROCHLORIDE 2.5 MG/ML
INJECTION, SOLUTION INTRAVENOUS PRN
Status: COMPLETED | OUTPATIENT
Start: 2024-09-17 | End: 2024-09-17

## 2024-09-17 RX ORDER — SODIUM CHLORIDE 9 MG/ML
INJECTION, SOLUTION INTRAVENOUS CONTINUOUS
Status: DISCONTINUED | OUTPATIENT
Start: 2024-09-17 | End: 2024-09-18

## 2024-09-17 RX ORDER — ACETAMINOPHEN 325 MG/1
650 TABLET ORAL EVERY 4 HOURS PRN
Status: DISCONTINUED | OUTPATIENT
Start: 2024-09-17 | End: 2024-09-19 | Stop reason: HOSPADM

## 2024-09-17 RX ORDER — LIDOCAINE HYDROCHLORIDE 20 MG/ML
INJECTION, SOLUTION EPIDURAL; INFILTRATION; INTRACAUDAL; PERINEURAL
Status: DISCONTINUED | OUTPATIENT
Start: 2024-09-17 | End: 2024-09-17 | Stop reason: SDUPTHER

## 2024-09-17 RX ORDER — GLYCOPYRROLATE 0.2 MG/ML
INJECTION INTRAMUSCULAR; INTRAVENOUS
Status: DISCONTINUED | OUTPATIENT
Start: 2024-09-17 | End: 2024-09-17 | Stop reason: SDUPTHER

## 2024-09-17 RX ORDER — ROCURONIUM BROMIDE 10 MG/ML
INJECTION, SOLUTION INTRAVENOUS
Status: DISCONTINUED | OUTPATIENT
Start: 2024-09-17 | End: 2024-09-17 | Stop reason: SDUPTHER

## 2024-09-17 RX ORDER — ONDANSETRON 2 MG/ML
4 INJECTION INTRAMUSCULAR; INTRAVENOUS EVERY 6 HOURS PRN
Status: DISCONTINUED | OUTPATIENT
Start: 2024-09-17 | End: 2024-09-19 | Stop reason: HOSPADM

## 2024-09-17 RX ORDER — LIDOCAINE HYDROCHLORIDE 10 MG/ML
INJECTION, SOLUTION EPIDURAL; INFILTRATION; INTRACAUDAL; PERINEURAL PRN
Status: COMPLETED | OUTPATIENT
Start: 2024-09-17 | End: 2024-09-17

## 2024-09-17 RX ORDER — ONDANSETRON 2 MG/ML
INJECTION INTRAMUSCULAR; INTRAVENOUS
Status: DISCONTINUED | OUTPATIENT
Start: 2024-09-17 | End: 2024-09-17 | Stop reason: SDUPTHER

## 2024-09-17 RX ORDER — SODIUM BICARBONATE 42 MG/ML
INJECTION, SOLUTION INTRAVENOUS PRN
Status: COMPLETED | OUTPATIENT
Start: 2024-09-17 | End: 2024-09-17

## 2024-09-17 RX ORDER — ASPIRIN 81 MG/1
81 TABLET, CHEWABLE ORAL ONCE
Status: COMPLETED | OUTPATIENT
Start: 2024-09-17 | End: 2024-09-17

## 2024-09-17 RX ORDER — IOPAMIDOL 612 MG/ML
INJECTION, SOLUTION INTRAVASCULAR PRN
Status: COMPLETED | OUTPATIENT
Start: 2024-09-17 | End: 2024-09-17

## 2024-09-17 RX ORDER — LABETALOL HYDROCHLORIDE 5 MG/ML
10 INJECTION, SOLUTION INTRAVENOUS
Status: DISCONTINUED | OUTPATIENT
Start: 2024-09-17 | End: 2024-09-19 | Stop reason: HOSPADM

## 2024-09-17 RX ORDER — FENTANYL CITRATE 50 UG/ML
INJECTION, SOLUTION INTRAMUSCULAR; INTRAVENOUS
Status: DISCONTINUED | OUTPATIENT
Start: 2024-09-17 | End: 2024-09-17 | Stop reason: SDUPTHER

## 2024-09-17 RX ORDER — HEPARIN SODIUM 1000 [USP'U]/ML
INJECTION, SOLUTION INTRAVENOUS; SUBCUTANEOUS PRN
Status: COMPLETED | OUTPATIENT
Start: 2024-09-17 | End: 2024-09-17

## 2024-09-17 RX ORDER — MIDAZOLAM HYDROCHLORIDE 2 MG/2ML
INJECTION, SOLUTION INTRAMUSCULAR; INTRAVENOUS PRN
Status: COMPLETED | OUTPATIENT
Start: 2024-09-17 | End: 2024-09-17

## 2024-09-17 RX ORDER — SUCCINYLCHOLINE/SOD CL,ISO/PF 200MG/10ML
SYRINGE (ML) INTRAVENOUS
Status: DISCONTINUED | OUTPATIENT
Start: 2024-09-17 | End: 2024-09-17 | Stop reason: SDUPTHER

## 2024-09-17 RX ADMIN — ACETAMINOPHEN 650 MG: 325 TABLET ORAL at 21:20

## 2024-09-17 RX ADMIN — HEPARIN SODIUM 100 ML: 1000 INJECTION INTRAVENOUS; SUBCUTANEOUS at 14:02

## 2024-09-17 RX ADMIN — HEPARIN SODIUM 4000 UNITS: 1000 INJECTION, SOLUTION INTRAVENOUS; SUBCUTANEOUS at 14:14

## 2024-09-17 RX ADMIN — TICAGRELOR 60 MG: 60 TABLET ORAL at 20:45

## 2024-09-17 RX ADMIN — HEPARIN SODIUM 100 ML: 1000 INJECTION INTRAVENOUS; SUBCUTANEOUS at 14:01

## 2024-09-17 RX ADMIN — MIDAZOLAM HYDROCHLORIDE 5 MG: 1 INJECTION, SOLUTION INTRAMUSCULAR; INTRAVENOUS at 13:30

## 2024-09-17 RX ADMIN — SODIUM CHLORIDE: 9 INJECTION, SOLUTION INTRAVENOUS at 16:17

## 2024-09-17 RX ADMIN — SODIUM CHLORIDE: 9 INJECTION, SOLUTION INTRAVENOUS at 15:02

## 2024-09-17 RX ADMIN — ONDANSETRON 4 MG: 2 INJECTION INTRAMUSCULAR; INTRAVENOUS at 16:11

## 2024-09-17 RX ADMIN — DEXAMETHASONE SODIUM PHOSPHATE 4 MG: 4 INJECTION, SOLUTION INTRAMUSCULAR; INTRAVENOUS at 13:41

## 2024-09-17 RX ADMIN — NITROGLYCERIN 200 MCG: 20 INJECTION INTRAVENOUS at 14:08

## 2024-09-17 RX ADMIN — PHENYLEPHRINE HYDROCHLORIDE 20 MCG/MIN: 10 INJECTION INTRAVENOUS at 13:49

## 2024-09-17 RX ADMIN — VERAPAMIL HYDROCHLORIDE 2.5 MG: 2.5 INJECTION, SOLUTION INTRAVENOUS at 14:08

## 2024-09-17 RX ADMIN — HEPARIN SODIUM 2000 UNITS: 1000 INJECTION, SOLUTION INTRAVENOUS; SUBCUTANEOUS at 14:25

## 2024-09-17 RX ADMIN — PROPOFOL 150 MG: 10 INJECTION, EMULSION INTRAVENOUS at 13:35

## 2024-09-17 RX ADMIN — ACETAMINOPHEN 650 MG: 325 TABLET ORAL at 16:18

## 2024-09-17 RX ADMIN — FENTANYL CITRATE 50 MCG: 50 INJECTION, SOLUTION INTRAMUSCULAR; INTRAVENOUS at 13:35

## 2024-09-17 RX ADMIN — HEPARIN SODIUM 2000 UNITS: 1000 INJECTION, SOLUTION INTRAVENOUS; SUBCUTANEOUS at 14:45

## 2024-09-17 RX ADMIN — GLYCOPYRROLATE 0.2 MG: 0.2 INJECTION INTRAMUSCULAR; INTRAVENOUS at 14:49

## 2024-09-17 RX ADMIN — SODIUM CHLORIDE: 9 INJECTION, SOLUTION INTRAVENOUS at 13:32

## 2024-09-17 RX ADMIN — HEPARIN SODIUM 2000 UNITS: 1000 INJECTION, SOLUTION INTRAVENOUS; SUBCUTANEOUS at 14:08

## 2024-09-17 RX ADMIN — ROCURONIUM BROMIDE 10 MG: 10 INJECTION, SOLUTION INTRAVENOUS at 13:35

## 2024-09-17 RX ADMIN — LIDOCAINE HYDROCHLORIDE 1 ML: 10 INJECTION, SOLUTION EPIDURAL; INFILTRATION; INTRACAUDAL; PERINEURAL at 14:03

## 2024-09-17 RX ADMIN — FENTANYL CITRATE 50 MCG: 50 INJECTION, SOLUTION INTRAMUSCULAR; INTRAVENOUS at 14:04

## 2024-09-17 RX ADMIN — SODIUM BICARBONATE 0.5 MEQ: 42 INJECTION, SOLUTION INTRAVENOUS at 14:08

## 2024-09-17 RX ADMIN — IOPAMIDOL 37 ML: 612 INJECTION, SOLUTION INTRAVENOUS at 14:55

## 2024-09-17 RX ADMIN — LIDOCAINE HYDROCHLORIDE 60 MG: 20 INJECTION, SOLUTION EPIDURAL; INFILTRATION; INTRACAUDAL; PERINEURAL at 13:35

## 2024-09-17 RX ADMIN — ARFORMOTEROL TARTRATE: 15 SOLUTION RESPIRATORY (INHALATION) at 20:47

## 2024-09-17 RX ADMIN — ASPIRIN 81 MG CHEWABLE TABLET 81 MG: 81 TABLET CHEWABLE at 19:10

## 2024-09-17 RX ADMIN — Medication 160 MG: at 13:35

## 2024-09-17 RX ADMIN — ONDANSETRON 4 MG: 2 INJECTION INTRAMUSCULAR; INTRAVENOUS at 14:59

## 2024-09-17 ASSESSMENT — PAIN DESCRIPTION - LOCATION
LOCATION: HEAD

## 2024-09-17 ASSESSMENT — PAIN SCALES - GENERAL
PAINLEVEL_OUTOF10: 7

## 2024-09-17 ASSESSMENT — LIFESTYLE VARIABLES: SMOKING_STATUS: 1

## 2024-09-17 ASSESSMENT — PAIN - FUNCTIONAL ASSESSMENT: PAIN_FUNCTIONAL_ASSESSMENT: NONE - DENIES PAIN

## 2024-09-17 ASSESSMENT — PAIN DESCRIPTION - DESCRIPTORS
DESCRIPTORS: ACHING
DESCRIPTORS: ACHING

## 2024-09-17 NOTE — INTERVAL H&P NOTE
Update History & Physical    The patient's History and Physical of August 21, 2024 was reviewed with the patient and I examined the patient. There was no change. The surgical site was confirmed by the patient and me.     Plan: The risks, benefits, expected outcome, and alternative to the recommended procedure have been discussed with the patient. Patient understands and wants to proceed with the procedure.     - Admit to ICU post procedure              - Check P2Y12 and aspirin assays post procedure              - q1 hour neuro checks               - SBP goal 100-160              - Cont DAPT    Diet: Regular  Activity: Up with assist after bedrest is completed  DVT prophylaxis: SCDs  Isolation precautions: None  Anticipated disposition: Home with office follow up      Electronically signed by TORI Forrester NP on 9/17/2024 at 1:04 PM

## 2024-09-17 NOTE — BRIEF OP NOTE
Neuro-Interventional Surgery - Brief procedure note      Patient: Andreea Aly MRN: [id number]     YOB: 1965  Age: 59 y.o.  Sex: female      Service: Neuro-Interventional Surgery    Date of Procedure: 9/17/2024    Pre-Procedure Diagnosis: Intracranial aneurysm    Post-Procedure Diagnosis: SAME    Procedure(s): Flow diversion stent embolization of right supraclinoid ICA  aneurysm    Vessels selected: Right common carotid artery, Right internal carotid artery, and Right Middle cerebral artery    Brief Description of Procedure: Successful flow diversion stent embolization of right paraclinoid ICA aneurysm performed using pipeline flow diversion stent.  No immediate complications.    The previously stent assisted coil embolized right MCA bifurcation aneurysm is excluded from the circulation by the coil mass without any residual or recurrent component.    Right radial artery puncture site hemostasis achieved using TR band. No immediate complications. No hematoma or oozing noted.  Distal hand perfusion remain unchanged post hemostasis.  Sterile dressing applied over the puncture site.      Anesthesia:General    Estimated Blood Loss: 10 ml.    Specimens:  None    Implants: 4.25 mm x 14 mm pipeline flow diversion stent with shield technology.    Apparent Intraoperative Complications:  None immediate    Patient Condition:  Stable    Disposition:  Intensive care unit    Attestation:  I performed the procedure.        Signed By: Arturo Meneses MD     September 17, 2024     [facility] NEUROINTERVENTIONAL SURGERY

## 2024-09-17 NOTE — PROGRESS NOTES
1215: Pt arrives ambulatory to angio department accompanied by daughter and sister for cerebral/cervical catheter arteriogram , possible flow diversion stent embolization of right supra clinoid ICA, use of arteriotomy closure device procedure. All assessments completed and consent was reviewed.  Education given was regarding procedure, general anesthesia sedation, post-procedure care and  management/follow-up. Opportunity for questions was provided and all questions and concerns were addressed.     1330: Patient placed on angio table without assistance. IR Staff and anesthesia present at bedside. Anesthesia to remain at bedside to manage pt airway, medications, VS and pt status.    1520:TRANSFER - OUT REPORT:    Verbal report given to Icu RN on Andreea ELKINS Jermain  being transferred to ICU 20 for routine post-op       Report consisted of patient's Situation, Background, Assessment and   Recommendations(SBAR).     Information from the following report(s) Adult Overview, Surgery Report, Intake/Output, MAR, Cardiac Rhythm NSR, Alarm Parameters, and Neuro Assessment was reviewed with the receiving nurse.           Lines:   Peripheral IV 09/17/24 Posterior;Left Hand (Active)       Arterial Line 09/17/24 Left Radial (Active)        Opportunity for questions and clarification was provided.      Patient transported with:  Monitor, O2 @ 10lpm, and Registered Nurse, CRNA

## 2024-09-17 NOTE — DISCHARGE SUMMARY
145/89 95 %   09/17/24 1329 -- 71 19 -- 99 %   09/17/24 1324 -- 73 16 -- 98 %   09/17/24 1319 -- 78 22 -- 97 %   09/17/24 1315 -- 76 21 130/83 95 %   09/17/24 1225 98.1 °F (36.7 °C) 78 14 128/81 99 %       Physical Exam:  Constitutional: Pleasant AA female resting in bed  Respiratory: Unlabored respirations     Neurologic Exam:  Mental Status:  Alert and oriented x 4.  Appropriate affect, mood and behavior.       Language:    Normal fluency, repetition, comprehension and naming. Follows commands    Cranial Nerves:   PERRL      Visual fields full to confrontation.     Extraocular movements intact.       Facial sensation intact.     Full facial strength, no asymmetry.      Hearing intact bilaterally.     Tongue is midline         Motor:     No pronator drift.      Bulk and tone normal.      5/5 power in all extremities proximally and distally.     No involuntary movements.    Sensation:    Sensation intact throughout to light touch.    Coordination & Gait: FNF intact. Gait is steady     DIET:   Normal diet    PLAN:  Discharge home with self care.    FOLLOW UP APPOINTMENTS:   1.  Follow up in the NIS clinic with Cathryn Berg on 11/4/24 at 1130  2   Please make an appointment to follow up with PCP as needed.     ACTIVITY:   Do not lift anything over 10 lbs for two weeks. Try to limit going up and down stairs as much as possible. Rest and hydrate. May resume all physical activities as tolerated after 2 weeks.     WOUND CARE:   Monitor for signs and sx of infection including fever, expanding inflammation and discolored drainage. Report any changes in temperature in affected extremity, numbness, weakness or hematoma.  If you experience any increased bruising or bleeding at your puncture site either outside or under the skin please apply direct, firm pressure for 20 minutes. Keep track of the bruising at the site by marking it with a pen or marker. If the bruising continues to be dark purple or blue in color, OR is

## 2024-09-17 NOTE — PROGRESS NOTES
NIS Brief Progress Note:  Rounded on pt post angio, she reports she is having headache at this time but she is also cold and hungry. Orders for PRN tylenol to be given and pt may eat at this time.     Physical Exam:  Gen: NAD, calm, cooperative  Neuro: A&Ox4. Follows commands. Speech clear. Affect normal. PERRL, 3 mm bilaterally. Blinks to threat. No disconjugate gaze present. EOMI. Face symmetric. Palate symmetric. Tongue midline. Geri spontaneously. Strength 5/5 in UE and LE BL. Negative drift. Bulk and tone normal. No involuntary movements. Gait deferred.  Skin: Warm, dry, color appropriate for ethnicity. Right radial arteriotomy site soft and non-tender on palpation, dressing is C/D/I, with no active bleeding or drainage noted.     Her family at bedside updated on plan of care.     Addendum 1800:   Labs reviewed  ARU: 570, subtherapeutic    - Given 81 mg ASA once now  PRU:7   - Brilinta ordered for this evening  Discussed with Dr Meneses.     TORI Forrester - ISABELLE  Neurocritical Care Nurse Practitioner  [phone]

## 2024-09-18 LAB
ANION GAP SERPL CALC-SCNC: 4 MMOL/L (ref 2–12)
BUN SERPL-MCNC: 11 MG/DL (ref 6–20)
BUN/CREAT SERPL: 14 (ref 12–20)
CALCIUM SERPL-MCNC: 8.8 MG/DL (ref 8.5–10.1)
CHLORIDE SERPL-SCNC: 111 MMOL/L (ref 97–108)
CO2 SERPL-SCNC: 24 MMOL/L (ref 21–32)
CREAT SERPL-MCNC: 0.77 MG/DL (ref 0.55–1.02)
ERYTHROCYTE [DISTWIDTH] IN BLOOD BY AUTOMATED COUNT: 15.8 % (ref 11.5–14.5)
GLUCOSE SERPL-MCNC: 164 MG/DL (ref 65–100)
HCT VFR BLD AUTO: 32.7 % (ref 35–47)
HGB BLD-MCNC: 10.6 G/DL (ref 11.5–16)
MCH RBC QN AUTO: 26.6 PG (ref 26–34)
MCHC RBC AUTO-ENTMCNC: 32.4 G/DL (ref 30–36.5)
MCV RBC AUTO: 82 FL (ref 80–99)
NRBC # BLD: 0 K/UL (ref 0–0.01)
NRBC BLD-RTO: 0 PER 100 WBC
PLATELET # BLD AUTO: 278 K/UL (ref 150–400)
PMV BLD AUTO: 8.9 FL (ref 8.9–12.9)
POTASSIUM SERPL-SCNC: 3.8 MMOL/L (ref 3.5–5.1)
RBC # BLD AUTO: 3.99 M/UL (ref 3.8–5.2)
SODIUM SERPL-SCNC: 139 MMOL/L (ref 136–145)
WBC # BLD AUTO: 4.9 K/UL (ref 3.6–11)

## 2024-09-18 PROCEDURE — 36415 COLL VENOUS BLD VENIPUNCTURE: CPT

## 2024-09-18 PROCEDURE — 6370000000 HC RX 637 (ALT 250 FOR IP): Performed by: NURSE PRACTITIONER

## 2024-09-18 PROCEDURE — 85027 COMPLETE CBC AUTOMATED: CPT

## 2024-09-18 PROCEDURE — 6360000002 HC RX W HCPCS: Performed by: NURSE PRACTITIONER

## 2024-09-18 PROCEDURE — 2060000000 HC ICU INTERMEDIATE R&B

## 2024-09-18 PROCEDURE — 80048 BASIC METABOLIC PNL TOTAL CA: CPT

## 2024-09-18 PROCEDURE — 94640 AIRWAY INHALATION TREATMENT: CPT

## 2024-09-18 RX ORDER — ALPRAZOLAM 0.5 MG
1 TABLET ORAL 3 TIMES DAILY PRN
Status: DISCONTINUED | OUTPATIENT
Start: 2024-09-18 | End: 2024-09-19 | Stop reason: HOSPADM

## 2024-09-18 RX ORDER — GINSENG 100 MG
CAPSULE ORAL ONCE
Status: COMPLETED | OUTPATIENT
Start: 2024-09-18 | End: 2024-09-18

## 2024-09-18 RX ADMIN — ALPRAZOLAM 1 MG: 0.5 TABLET ORAL at 17:06

## 2024-09-18 RX ADMIN — TICAGRELOR 60 MG: 60 TABLET ORAL at 21:02

## 2024-09-18 RX ADMIN — ASPIRIN 81 MG: 81 TABLET, COATED ORAL at 08:27

## 2024-09-18 RX ADMIN — ACETAMINOPHEN 650 MG: 325 TABLET ORAL at 12:42

## 2024-09-18 RX ADMIN — ARFORMOTEROL TARTRATE: 15 SOLUTION RESPIRATORY (INHALATION) at 20:08

## 2024-09-18 RX ADMIN — PANTOPRAZOLE SODIUM 40 MG: 40 TABLET, DELAYED RELEASE ORAL at 07:11

## 2024-09-18 RX ADMIN — BACITRACIN: 500 OINTMENT TOPICAL at 04:01

## 2024-09-18 RX ADMIN — ACETAMINOPHEN 650 MG: 325 TABLET ORAL at 08:39

## 2024-09-18 RX ADMIN — DILTIAZEM HYDROCHLORIDE 120 MG: 120 CAPSULE, COATED, EXTENDED RELEASE ORAL at 08:27

## 2024-09-18 RX ADMIN — TICAGRELOR 60 MG: 60 TABLET ORAL at 08:27

## 2024-09-18 RX ADMIN — ARFORMOTEROL TARTRATE: 15 SOLUTION RESPIRATORY (INHALATION) at 07:37

## 2024-09-18 ASSESSMENT — PAIN DESCRIPTION - ORIENTATION
ORIENTATION: RIGHT;LEFT
ORIENTATION: LEFT;RIGHT
ORIENTATION: RIGHT;LEFT;ANTERIOR

## 2024-09-18 ASSESSMENT — PAIN SCALES - GENERAL
PAINLEVEL_OUTOF10: 5
PAINLEVEL_OUTOF10: 2
PAINLEVEL_OUTOF10: 0

## 2024-09-18 ASSESSMENT — PAIN DESCRIPTION - DESCRIPTORS
DESCRIPTORS: ACHING

## 2024-09-18 ASSESSMENT — PAIN DESCRIPTION - LOCATION
LOCATION: HEAD
LOCATION: HEAD

## 2024-09-18 NOTE — DISCHARGE INSTRUCTIONS
Andreearoxy Aly,     You have been hospitalized for flow diversion stent embolization of a R ICA aneurysm. Please review the following information below.     OFFICE NUMBER: [phone] , you can reach the on call physician 24/7 even during non-business hours     FOLLOW UP APPOINTMENTS:   1.  Follow up in the NIS clinic with Cathryn Berg NP September 30 at 11:30 AM  2   Please make an appointment to follow up with PCP in one week for BP check and possible vertigo.     ACTIVITY:   Do not lift anything over 10 lbs for two weeks. Try to limit going up and down stairs as much as possible. Rest and hydrate. May resume all physical activities as tolerated after 2 weeks.      WOUND CARE:   Monitor for signs and sx of infection including fever, expanding inflammation and discolored drainage. Report any changes in temperature in affected extremity, numbness, weakness or hematoma.  If you experience any increased bruising or bleeding at your puncture site either outside or under the skin please apply direct, firm pressure for 20 minutes. Keep track of the bruising at the site by marking it with a pen or marker. If the bruising continues to be dark purple or blue in color, OR is expanding, please call our office to let the on call doctor know. We have someone on call 24/7. You may shower normally and cleanse the site with gentle soap. Pat dry with a clean towel. Do not soak in a bathtub or hot tub. Do not apply any lotions, creams, powders, or other cosmetic products to the site for 2 weeks.      OTHER RECOMMENDATIONS:  BEFAST reviewed to educate for stroke symptoms. Please call 911 and proceed to emergency department immediately if you develop any of the following:  - Acute changes in your balance or vision  - Weakness in your face, arm or leg   - Speech changes or difficulties.  Patient was instructed to seek ER services if she experienced the worst headache of her life.  Patient also instructed to check BP daily at home  and log BP readings     DISCHARGE MEDICATIONS:   1. Resume all home meds  2. Continue Aspirin 81 mg QD   3. Continue Brilinta 30 mg BID     It was a pleasure caring for you. We wish you the best.     The NIS Team

## 2024-09-18 NOTE — PROGRESS NOTES
2010: Aleksandar NP at bedside to examine patient. Patient is complaining of 7/10 Headache after tylenol given by dayshift. NP ordered STAT head CT.    2014: patient to CT on monitor with RN x 1. Tolerated well.     0100: Per Aleksandar NP, ok to d/c fluids and arterial line after getting AM labs. Order for bacitracin placed per protocol.    CMICU DAILY GOALS       A: Awake    RASS: Goal - RASS Goal: 0-->alert and calm  Actual - RASS ([place] Agitation-Sedation Scale): 0-->alert and calm(no sedation)   Restraint necessity: Clinical Justification: Treatment Interference  B: Breath   SBT: Not attempted   C: Coordinate A & B, analgesics/sedatives   Pain: managed    SAT: NA  D: Delirium   CAM-ICU: Overall CAM-ICU: Positive  E: Early Mobility   MOVE Screen: Fail   Activity: Activity Management: activity adjusted per tolerance  FAS: Feeding/Nutrition   Diet order: Diet/Nutrition Received: NPO, tube feeding,   Fluid restriction:    T: Thrombus   DVT prophylaxis: VTE Required Core Measure: Pharmacological prophylaxis initiated/maintained  H: HOB Elevation   Head of Bed (HOB): HOB at 30-45 degrees  U: Ulcer Prophylaxis   GI: yes  G: Glucose control   managed Glycemic Management: blood glucose monitoring  S: Skin   Bundle compliance: yes   Bathing/Skin Care: bath, chlorhexidine, bath, complete, dressed/undressed, incontinence care, linen changed Date: 01/22 AM Shift  B: Bowel Function   no issues   I: Indwelling Catheters   Arcos necessity: [REMOVED]      Urethral Catheter 01/20/20 1950 Straight-tip 16 Fr.-Reason for Continuing Urinary Catheterization: Critically ill in ICU requiring intensive monitoring   CVC necessity: Yes   IPAD offered: Not appropriate  D: De-escalation Antibx   No  Plan for the day   Monitor neuro status  Family/Goals of care/Code Status   Code Status: Full Code     No acute events throughout day, VS and assessment per flow sheet, patient progressing towards goals as tolerated, plan of care reviewed with Vaughn Retana and family, all concerns addressed, will continue to monitor.

## 2024-09-18 NOTE — PROGRESS NOTES
Neurointerventional Surgery Progress Note  Josefina JOÃO Bell, APRN - CNP    Admit Date: 9/17/2024   LOS: 1 day      Daily Progress Note: 9/18/2024    Assessment and Plan    #S/P flow diversion stent embolization of right supraclinoid ICA aneurysm by Dr. Meneses on 9/18/24  #Vertigo   #Headache   Patient is currently one day s/p flow diversion stent embolization of right supraclinoid ICA aneurysm by Dr. Meneses. Post procedure, she has experienced headache and vertigo. Neuro exam is non focal. CT Head obtained overnight negative for acute findings.     Plan:   - Continue Aspirin 81 mg + Brilinta 60 mg BID   - Continue home meds   - PRN Tylenol for headache   - Neuro checks Q4, tx to NIS 2 for monitoring given vertigo      HPI   Andreea Aly is a 59 y.o. female who is being seen to undergo elective flow diversion stent embolization of right supraclinoid ICA aneurysm.    She underwent right middle cerebral artery bifurcation aneurysm stent assisted coiling in April of this year.     Subjective      Overnight, patient continued to have headache. CT Head was obtained that showed no acute intracranial abnormality. This morning she had a 5/10 ha that was reduced to 1/10 pain with tylenol.     In addition to having ha, she also noted feeling dizzy and that she was not able to focus her vision.       Allergies   Allergen Reactions    Penicillins Anaphylaxis     Has taken cephalexin without problem    Hydrocodone Itching       Past Medical History:   Diagnosis Date    Acute bronchitis Jan 2015    Acute sinusitis     Anxiety     Arthritis     Asthma     Back pain     Hernandez's cyst 06/10/2024    Chest pain     Delivery normal     x4    Dizziness     Fatigue     Fractures     Gastrointestinal disorder     \"stomach ulcer\"    History of blood transfusion 2007    ABD. BLEEDING    Hyperlipidemia     NON COMPLIANT W/ MEDICATION    Hypertension     CHALINO on CPAP     Osteoarthritis     Other ill-defined conditions(359.89)     anemia

## 2024-09-18 NOTE — PROGRESS NOTES
Neurocritical Care Brief Progress Note:  Pt is s/p flow diversion stent embolization of right supraclinoid ICA aneurysm with Dr. Meneses on 09/17/24 without any complications. Right groin arteriotomy site is C/D/I without oozing, hematoma, tenderness, redness, bruising. + distal pulses.    Post angio pt reported headache which was treated however continued to c/o during rounding and stated headache is at 7/10 across the frontal exacerbated with lights. Stat CTH obtained showed no evidence of acute intracranial abnormality. Aneurysm coils noted in the right middle fossa.     Pt is neuro intact with no focal deficits reported or noted on exams. On NS at 100ml/hr. Will continue till the morning and dc with plan for possible dc home it patient remained stable overnight. P2y12 and aspirin assays reviewed. Pending morning labs.      Review of Systems:   C/o headache otherwise no other issues. No visual issues, no ear/nose, throat problems; no coughing or wheezing or shortness of breath, No chest pain or orthopnea, no abdominal pain, nausea or vomiting, No pain in the body or extremities, no psychiatric, neurological, endocrine, hematological or cardiac complaints except as noted above.      Physical Exam:   Blood pressure 137/74, pulse 67, temperature 97.6 °F (36.4 °C), temperature source Oral, resp. rate 14, height 1.829 m (6'), weight 119.3 kg (263 lb), SpO2 95%. Net IO Since Admission: 1,436.25 mL [09/17/24 2218]    GEN: Cooperative, Calm, Well developed, well nourished patient in NAD  HEENT: Normocephalic. Non-icter, no congestion  Lungs:  Diminished bilaterally ant, non-labored breathing, RA  Cardiac: S1,S2, normal rate and rhythm, with no murmurs. no gallops  Abdomen: Normal bowel sounds, no distention, soft, non-tender  Extremities: 2+ Radial pulses, no clubbing, cyanosis, or edema  Skin: no rashes or lesions noted; right groin arteriotomy site is C/D/I without oozing, hematoma, tenderness, redness, bruising. +  distal pulses. Skin color appropriate and warm for ethnicity.         NEURO:  Mental status: Oriented to time, situation, place and person. Able to follow commands appropriately  Cranial Nerves:  II-XII intact; Attention and fund of knowledge is appropriate/intact. Speech/Language is intact/fluent. Normal recent and remote memory. EOMI, PERRL, no nystagmus, no ptosis. No dysarthria or aphasia. Full facial strength, no asymmetry. Hearing intact bilaterally. Tongue protrudes to midline, palate elevates symmetrically. Shrug Shoulders B/L  Motor:  Normal bulk and tone, 5/5 strength x 4 extremities proximally and distally; No involuntary movements.  Coordination: Intact FTN and HTS testing  Reflexes: +2 throughout, down going toes bilaterally   Sensation: Intact x 4 extremities to light touch  Gait: Deferred         Continue current plan per NIS.      Case d/w: EUGENE Berg NP, primary nurse, and patient        spent 35 minutes of time involved in chart review, lab review, imaging review       Shantel Huertas [facility]-NP  Neurocritical Care Nurse Practitioner  [phone]

## 2024-09-19 VITALS
RESPIRATION RATE: 12 BRPM | TEMPERATURE: 97.6 F | BODY MASS INDEX: 35.62 KG/M2 | HEART RATE: 66 BPM | HEIGHT: 72 IN | SYSTOLIC BLOOD PRESSURE: 102 MMHG | WEIGHT: 263 LBS | DIASTOLIC BLOOD PRESSURE: 67 MMHG | OXYGEN SATURATION: 100 %

## 2024-09-19 LAB
ANION GAP SERPL CALC-SCNC: 1 MMOL/L (ref 2–12)
BASOPHILS # BLD: 0.1 K/UL (ref 0–0.1)
BASOPHILS NFR BLD: 1 % (ref 0–1)
BUN SERPL-MCNC: 13 MG/DL (ref 6–20)
BUN/CREAT SERPL: 16 (ref 12–20)
CALCIUM SERPL-MCNC: 9.4 MG/DL (ref 8.5–10.1)
CHLORIDE SERPL-SCNC: 109 MMOL/L (ref 97–108)
CO2 SERPL-SCNC: 29 MMOL/L (ref 21–32)
CREAT SERPL-MCNC: 0.79 MG/DL (ref 0.55–1.02)
DIFFERENTIAL METHOD BLD: ABNORMAL
EOSINOPHIL # BLD: 0.2 K/UL (ref 0–0.4)
EOSINOPHIL NFR BLD: 3 % (ref 0–7)
ERYTHROCYTE [DISTWIDTH] IN BLOOD BY AUTOMATED COUNT: 16.1 % (ref 11.5–14.5)
GLUCOSE SERPL-MCNC: 81 MG/DL (ref 65–100)
HCT VFR BLD AUTO: 37.2 % (ref 35–47)
HGB BLD-MCNC: 12 G/DL (ref 11.5–16)
IMM GRANULOCYTES # BLD AUTO: 0 K/UL (ref 0–0.04)
IMM GRANULOCYTES NFR BLD AUTO: 0 % (ref 0–0.5)
LYMPHOCYTES # BLD: 3.5 K/UL (ref 0.8–3.5)
LYMPHOCYTES NFR BLD: 46 % (ref 12–49)
MCH RBC QN AUTO: 26.5 PG (ref 26–34)
MCHC RBC AUTO-ENTMCNC: 32.3 G/DL (ref 30–36.5)
MCV RBC AUTO: 82.3 FL (ref 80–99)
MONOCYTES # BLD: 0.6 K/UL (ref 0–1)
MONOCYTES NFR BLD: 8 % (ref 5–13)
NEUTS SEG # BLD: 3.1 K/UL (ref 1.8–8)
NEUTS SEG NFR BLD: 42 % (ref 32–75)
NRBC # BLD: 0 K/UL (ref 0–0.01)
NRBC BLD-RTO: 0 PER 100 WBC
PLATELET # BLD AUTO: 357 K/UL (ref 150–400)
PMV BLD AUTO: 8.9 FL (ref 8.9–12.9)
POTASSIUM SERPL-SCNC: 3.9 MMOL/L (ref 3.5–5.1)
RBC # BLD AUTO: 4.52 M/UL (ref 3.8–5.2)
SODIUM SERPL-SCNC: 139 MMOL/L (ref 136–145)
WBC # BLD AUTO: 7.5 K/UL (ref 3.6–11)

## 2024-09-19 PROCEDURE — 99024 POSTOP FOLLOW-UP VISIT: CPT | Performed by: NURSE PRACTITIONER

## 2024-09-19 PROCEDURE — 97165 OT EVAL LOW COMPLEX 30 MIN: CPT

## 2024-09-19 PROCEDURE — 97530 THERAPEUTIC ACTIVITIES: CPT

## 2024-09-19 PROCEDURE — 94640 AIRWAY INHALATION TREATMENT: CPT

## 2024-09-19 PROCEDURE — 97535 SELF CARE MNGMENT TRAINING: CPT

## 2024-09-19 PROCEDURE — 80048 BASIC METABOLIC PNL TOTAL CA: CPT

## 2024-09-19 PROCEDURE — 85025 COMPLETE CBC W/AUTO DIFF WBC: CPT

## 2024-09-19 PROCEDURE — 97161 PT EVAL LOW COMPLEX 20 MIN: CPT

## 2024-09-19 PROCEDURE — 36415 COLL VENOUS BLD VENIPUNCTURE: CPT

## 2024-09-19 PROCEDURE — 6370000000 HC RX 637 (ALT 250 FOR IP): Performed by: NURSE PRACTITIONER

## 2024-09-19 PROCEDURE — 6360000002 HC RX W HCPCS: Performed by: NURSE PRACTITIONER

## 2024-09-19 PROCEDURE — 2580000003 HC RX 258: Performed by: NURSE PRACTITIONER

## 2024-09-19 RX ORDER — METOCLOPRAMIDE HYDROCHLORIDE 5 MG/ML
5 INJECTION INTRAMUSCULAR; INTRAVENOUS ONCE
Status: DISCONTINUED | OUTPATIENT
Start: 2024-09-19 | End: 2024-09-19 | Stop reason: HOSPADM

## 2024-09-19 RX ORDER — SODIUM CHLORIDE, SODIUM LACTATE, POTASSIUM CHLORIDE, AND CALCIUM CHLORIDE .6; .31; .03; .02 G/100ML; G/100ML; G/100ML; G/100ML
250 INJECTION, SOLUTION INTRAVENOUS ONCE
Status: COMPLETED | OUTPATIENT
Start: 2024-09-19 | End: 2024-09-19

## 2024-09-19 RX ADMIN — ACETAMINOPHEN 650 MG: 325 TABLET ORAL at 06:22

## 2024-09-19 RX ADMIN — SODIUM CHLORIDE, SODIUM LACTATE, POTASSIUM CHLORIDE, AND CALCIUM CHLORIDE 250 ML: 600; 310; 30; 20 INJECTION, SOLUTION INTRAVENOUS at 13:00

## 2024-09-19 RX ADMIN — ARFORMOTEROL TARTRATE: 15 SOLUTION RESPIRATORY (INHALATION) at 08:45

## 2024-09-19 RX ADMIN — PANTOPRAZOLE SODIUM 40 MG: 40 TABLET, DELAYED RELEASE ORAL at 06:22

## 2024-09-19 RX ADMIN — ACETAMINOPHEN 650 MG: 325 TABLET ORAL at 11:06

## 2024-09-19 RX ADMIN — TICAGRELOR 60 MG: 60 TABLET ORAL at 08:35

## 2024-09-19 RX ADMIN — ASPIRIN 81 MG: 81 TABLET, COATED ORAL at 08:35

## 2024-09-19 RX ADMIN — DILTIAZEM HYDROCHLORIDE 120 MG: 120 CAPSULE, COATED, EXTENDED RELEASE ORAL at 08:36

## 2024-09-19 ASSESSMENT — PAIN DESCRIPTION - ORIENTATION
ORIENTATION: RIGHT;LEFT
ORIENTATION: RIGHT;ANTERIOR

## 2024-09-19 ASSESSMENT — PAIN DESCRIPTION - LOCATION
LOCATION: HEAD
LOCATION: HEAD

## 2024-09-19 ASSESSMENT — PAIN SCALES - GENERAL
PAINLEVEL_OUTOF10: 5
PAINLEVEL_OUTOF10: 3

## 2024-09-19 ASSESSMENT — PAIN DESCRIPTION - DESCRIPTORS
DESCRIPTORS: ACHING;SHARP
DESCRIPTORS: CRAMPING;ACHING

## 2024-09-19 ASSESSMENT — PAIN - FUNCTIONAL ASSESSMENT: PAIN_FUNCTIONAL_ASSESSMENT: ACTIVITIES ARE NOT PREVENTED

## 2024-09-19 NOTE — PROGRESS NOTES
Physical Therapy 9/19/2024    Orders received, chart reviewed and patient evaluated by physical therapy. Pending progression with skilled acute physical therapy, recommend:    Outpatient physical therapy for vestibular intervention    Recommend with nursing OOB to chair 3x/day and walking daily with 1x assist. Thank you for completing as able in order to maintain patient strength, endurance and independence.     Full evaluation to follow.      Thank you,  Niesha Grajeda, PT, DPT, NCS

## 2024-09-19 NOTE — DISCHARGE SUMMARY
Neurointerventional Surgery Discharge Summary  [address] [facility]  [address]  [place], [id number]  [phone]    Patient: Andreea Aly MRN: [id number]  SSN: [id number]    YOB: 1965  Age: 59 y.o.  Sex: female      DATE OF ADMISSION: 9/17/2024    DATE OF DISCHARGE: 09/19/24     ADMITTING PROVIDER: Arturo Meneses MD    DISCHARGING PROVIDER: TORI Soria NP    CONSULTATIONS: Physical Therapy and Occupational Therapy    PRIMARY CARE PHYSICIAN: Olman Billy MD    PROCEDURES/SURGERIES: Procedure(s) with comments:   Successful flow diversion stent embolization of right paraclinoid ICA aneurysm performed using pipeline flow diversion stent.  No immediate complications.     The previously stent assisted coil embolized right MCA bifurcation aneurysm is excluded from the circulation by the coil mass without any residual or recurrent component.    OFFICE NUMBER: [phone] , you can reach the on call physician 24/7 even during non-business hours     ADMITTING DIAGNOSES & HOSPITAL COURSE:     ICD-10-CM    1. Cerebral aneurysm  I67.1 IR ARCH UNI CAR CERV CEREBRAL     IR ARCH UNI CAR CERV CEREBRAL        Pt was admitted for scheduled elective cerebral angiogram and flow diversion stent embolization of right paraclinoid ICA aneurysm by Dr. Meneses. The procedure was performed in the Interventional Radiology suite under general anesthesia. The patient tolerated the procedure well without complications. Following extubation, patient was transferred to ICU, fully recovered and returned to neurological baseline. The patient was admitted overnight for continued monitoring due to potential risks of stroke and thrombosis. The patient developed a headache and vertigo-like symptoms post procedure. She was given IV fluids and treated with Tylenol. She had some relief with Tylenol, but patient stayed an additional night for further monitoring. She reports a 5/10 sharp headache today located specifically on  medications      ticagrelor 60 MG Tabs tablet  Commonly known as: Brilinta  Take 0.5 tablets by mouth 2 times daily  What changed: how much to take            CONTINUE taking these medications      albuterol sulfate  (90 Base) MCG/ACT inhaler  Commonly known as: PROVENTIL;VENTOLIN;PROAIR     ALPRAZolam 1 MG tablet  Commonly known as: XANAX     aspirin 81 MG EC tablet  Take 1 tablet by mouth daily     butalbital-acetaminophen-caffeine -40 MG per tablet  Commonly known as: FIORICET, ESGIC     diclofenac sodium 1 % Gel  Commonly known as: VOLTAREN     fluticasone-salmeterol 45-21 MCG/ACT inhaler  Commonly known as: ADVAIR HFA     pantoprazole 20 MG tablet  Commonly known as: PROTONIX     Tiadylt  MG extended release capsule  Generic drug: dilTIAZem               Where to Get Your Medications        These medications were sent to [facility]/pharmacy #[id number] - [place], [place] - [address] - P [phone] - F [phone]  [address] [facility] [address] [address]      Hours: 24-hours Phone: [phone]   ticagrelor 60 MG Tabs tablet       Plan discussed with Dr. Gideon RN, patient, and patient's family.    Greater than 30 minutes were spent in patient management, greater than half of which was spent in counseling and coordination of care    TORI Soria - NP  Neurointerventional Surgery

## 2024-09-19 NOTE — PROGRESS NOTES
OCCUPATIONAL THERAPY EVALUATION/DISCHARGE  Patient: Andreea Aly (59 y.o. female)  Date: 9/19/2024  Primary Diagnosis: Cerebral aneurysm [I67.1]   2 Days Post-Op     Precautions:  (SBP <160)                  ASSESSMENT :  Based on the objective data below, the patient presents just below her IND baseline s/p low diversion stent embolization of right supraclinoid ICA aneurysm POD #2. She is IND at baseline, lives alone with her daughter nearby who plans to stay with her for a few days upon d/c, working. She is currently completing ADLs and functional mobility with no AD with IND-SPV, cautious with good safety awareness. She does endorse some dizziness with visual assessment/tracking, no nystagmus noted, no diplopia. Reviewed adaptive lower body ADL techniques and EC principles, patient and family acknowledging understanding, VSS. No further acute OT needs, safe to d/c home once medically cleared.     Functional Outcome Measure:  The patient scored 23/24 on the AM-PAC outcome measure which is indicative of lower odds of rehab needs upon d/c.      Further skilled acute occupational therapy is not indicated at this time.     PLAN :  Recommend with staff: up with SPV, ADLs in bathroom, hallway ambulation with SPV    Recommendation for discharge: (in order for the patient to meet his/her long term goals):   No skilled occupational therapy    Other factors to consider for discharge: no additional factors    IF patient discharges home will need the following DME: none     SUBJECTIVE:   Patient stated, “I feel good. Just riding the wave today but I feel better than earlier.”    OBJECTIVE DATA SUMMARY:     Past Medical History:   Diagnosis Date    Acute bronchitis Jan 2015    Acute sinusitis     Anxiety     Arthritis     Asthma     Back pain     Hernandez's cyst 06/10/2024    Chest pain     Delivery normal     x4    Dizziness     Fatigue     Fractures     Gastrointestinal disorder     \"stomach ulcer\"    History of blood  Caregiver / family present, and Side rails x3    COMMUNICATION/EDUCATION:   The patient's plan of care was discussed with: physical therapist and registered nurse    Patient Education  Education Given To: Patient;Family  Education Provided: Role of Therapy;Precautions;ADL Adaptive Strategies;Energy Conservation;IADL Safety;Family Education;Fall Prevention Strategies  Education Method: Demonstration;Verbal;Teach Back  Barriers to Learning: None  Education Outcome: Verbalized understanding;Demonstrated understanding    Thank you for this referral.  Disha Tam OTD, OTR/L  Minutes: 14    Occupational Therapy Evaluation Charge Determination   History Examination Decision-Making   LOW Complexity : Brief history review  LOW Complexity: 1-3 Performance deficits relating to physical, cognitive, or psychosocial skills that result in activity limitations and/or participation restrictions LOW Complexity: No comorbidities that affect functional and  no verbal  or physical assist needed to complete eval tasks   Based on the above components, the patient evaluation is determined to be of the following complexity level: Low

## 2024-09-19 NOTE — CARE COORDINATION
245pm: CM attempted to complete initial assessment with pt, but pt has dc orders in.     CM met with pt and family at bedside to provide OP Vestibular PT script to [facility], per MD and pt request.     RACHEL Allen

## 2024-09-19 NOTE — PLAN OF CARE
Problem: Physical Therapy - Adult  Goal: By Discharge: Performs mobility at highest level of function for planned discharge setting.  See evaluation for individualized goals.  Description: FUNCTIONAL STATUS PRIOR TO ADMISSION: Patient was independent and active without use of DME.    HOME SUPPORT PRIOR TO ADMISSION: The patient lived alone with family & friends to provide assistance.    Physical Therapy Goals  Initiated 9/19/2024  1.  Patient will perform sit to stand with independence within 7 day(s).  2.  Patient will transfer from bed to chair and chair to bed with modified independence using the least restrictive device within 7 day(s).  3.  Patient will ambulate with modified independence for 250 feet with the least restrictive device within 7 day(s).   4.  Patient will ascend/descend 8 stairs with handrail(s) with modified independence within 7 day(s).  5.  Patient will complete initial Gunter Balance Scale & improve score by 7 points within 7 days.     9/19/2024 1246 by Niesha Grajeda, PT  Outcome: Progressing     Problem: Discharge Planning  Goal: Discharge to home or other facility with appropriate resources  9/19/2024 1811 by Jennie Nunez RN  Outcome: Adequate for Discharge  9/19/2024 1810 by Jennie Nunez RN  Outcome: Adequate for Discharge  9/19/2024 1412 by Jennie Nunez RN  Outcome: Progressing  Flowsheets (Taken 9/19/2024 0800)  Discharge to home or other facility with appropriate resources:   Identify barriers to discharge with patient and caregiver   Arrange for needed discharge resources and transportation as appropriate   Identify discharge learning needs (meds, wound care, etc)     Problem: Pain  Goal: Verbalizes/displays adequate comfort level or baseline comfort level  9/19/2024 1811 by Jennie Nunez RN  Outcome: Adequate for Discharge  9/19/2024 1810 by Jennie Nunez RN  Outcome: Adequate for Discharge  9/19/2024 1412 by Jennie Nunez RN  Outcome: Progressing  Flowsheets (Taken 9/19/2024

## 2024-09-19 NOTE — PLAN OF CARE
Problem: Chronic Conditions and Co-morbidities  Goal: Patient's chronic conditions and co-morbidity symptoms are monitored and maintained or improved  Outcome: Progressing  Flowsheets  Taken 9/19/2024 0017 by Salud Barrett LPN  Care Plan - Patient's Chronic Conditions and Co-Morbidity Symptoms are Monitored and Maintained or Improved: Monitor and assess patient's chronic conditions and comorbid symptoms for stability, deterioration, or improvement  Taken 9/18/2024 2000 by Wenceslao Love RN  Care Plan - Patient's Chronic Conditions and Co-Morbidity Symptoms are Monitored and Maintained or Improved: Monitor and assess patient's chronic conditions and comorbid symptoms for stability, deterioration, or improvement     Problem: Discharge Planning  Goal: Discharge to home or other facility with appropriate resources  Outcome: Progressing  Flowsheets  Taken 9/19/2024 0017 by Salud Barrett LPN  Discharge to home or other facility with appropriate resources: Identify barriers to discharge with patient and caregiver  Taken 9/18/2024 2000 by Wenceslao Love RN  Discharge to home or other facility with appropriate resources: Identify barriers to discharge with patient and caregiver     Problem: Skin/Tissue Integrity  Goal: Absence of new skin breakdown  Description: 1.  Monitor for areas of redness and/or skin breakdown  2.  Assess vascular access sites hourly  3.  Every 4-6 hours minimum:  Change oxygen saturation probe site  4.  Every 4-6 hours:  If on nasal continuous positive airway pressure, respiratory therapy assess nares and determine need for appliance change or resting period.  Outcome: Progressing     Problem: ABCDS Injury Assessment  Goal: Absence of physical injury  Outcome: Progressing     Problem: Pain  Goal: Verbalizes/displays adequate comfort level or baseline comfort level  Outcome: Progressing  Flowsheets (Taken 9/18/2024 2000 by Wenceslao Love RN)  Verbalizes/displays adequate comfort level or  baseline comfort level:   Encourage patient to monitor pain and request assistance   Assess pain using appropriate pain scale     Problem: Safety - Adult  Goal: Free from fall injury  Outcome: Progressing  Flowsheets (Taken 9/19/2024 0017)  Free From Fall Injury: Instruct family/caregiver on patient safety

## 2024-09-19 NOTE — PLAN OF CARE
Problem: Physical Therapy - Adult  Goal: By Discharge: Performs mobility at highest level of function for planned discharge setting.  See evaluation for individualized goals.  Description: FUNCTIONAL STATUS PRIOR TO ADMISSION: Patient was independent and active without use of DME.    HOME SUPPORT PRIOR TO ADMISSION: The patient lived alone with family & friends to provide assistance.    Physical Therapy Goals  Initiated 9/19/2024  1.  Patient will perform sit to stand with independence within 7 day(s).  2.  Patient will transfer from bed to chair and chair to bed with modified independence using the least restrictive device within 7 day(s).  3.  Patient will ambulate with modified independence for 250 feet with the least restrictive device within 7 day(s).   4.  Patient will ascend/descend 8 stairs with handrail(s) with modified independence within 7 day(s).  5.  Patient will complete initial Gunter Balance Scale & improve score by 7 points within 7 days.     Outcome: Progressing   PHYSICAL THERAPY EVALUATION    Patient: Andreea Aly (59 y.o. female)  Date: 9/19/2024  Primary Diagnosis: Cerebral aneurysm [I67.1]   2 Days Post-Op   Precautions: Restrictions/Precautions: Fall Risk                      ASSESSMENT :   DEFICITS/IMPAIRMENTS:   The patient is limited by decreased functional mobility, strength, body mechanics, activity tolerance, balance, dizziness s/p admission for cerebral aneurysm, now POD # 2 from flow diversion stent embolization of right supraclinoid ICA aneurysm.  Prior to admission patient reports being independent with mobility.    Based on the impairments listed above patient is below her functional baseline. She completed bed mobility independently this date and transfers & gait with standby assist. She demonstrated slow, cautious gait but no overt loss of balance. Patient reported increased headache with mobility, vitals stable and RN notified. Given reports of dizziness, visual screening  Phys Ther. 2021 Apr 4;101(4):epyc864. doi: 10.1093/ptj/hett053. PMID: 70534402.  3. Dane MORENO, Shakila ELKINS, Estephanie S, Tiago MATTHEW, Simeon S. Activity Measure for Post-Acute Care \"6-Clicks\" Basic Mobility Scores Predict Discharge Destination After Acute Care Hospitalization in Select Patient Groups: A Retrospective, Observational Study. Arch Rehabil Res Clin Transl. 2022 Jul 16;4(3):581617. doi: 10.1016/j.arrct.2022.155274. PMID: 62685072; PMCID: LJA5173973.  4. Yari WESTFALL, Mag S, Macario W, Neida P. AM-PAC Short Forms Manual 4.0. Revised 2/2020.                                                                                                                                                                                                                              Pain Rating:  Does not quantify but reports headache throughout session with some worsening with activity, RN aware    Activity Tolerance:   Fair  and requires rest breaks     09/19/24 0946 09/19/24 1000   Vitals   Pulse 73  --    Heart Rate Source  --  Monitor   /79 (!) 146/95   MAP (Calculated) 95 112   BP Location Right upper arm Right upper arm   BP Method Automatic Automatic   Patient Position Sitting  (after activity) Semi fowlers       After treatment:   Patient left in no apparent distress in bed, Call bell within reach, Bed/ chair alarm activated, Caregiver / family present, Side rails x3, Updated patient's board on functional status and mobility recommendations, and RN aware    COMMUNICATION/EDUCATION:   The patient's plan of care was discussed with: registered nurse    Patient Education  Education Given To: Patient  Education Provided: Role of Therapy;Plan of Care;Transfer Training;Fall Prevention Strategies;Precautions;Energy Conservation  Education Method: Verbal  Barriers to Learning: None  Education Outcome: Verbalized understanding;Continued education needed    Thank you for this referral.  Niesha Grajeda, PT  Minutes: 34      Physical Therapy  Evaluation Charge Determination   History Examination Presentation Decision-Making   MEDIUM  Complexity : 1-2 comorbidities / personal factors will impact the outcome/ POC  MEDIUM Complexity : 3 Standardized tests and measures addressin body structure, function, activity limitation and / or participation in recreation  LOW Complexity : Stable, uncomplicated  AM-PAC  LOW    Based on the above components, the patient evaluation is determined to be of the following complexity level: Low

## 2024-09-19 NOTE — PLAN OF CARE
Problem: Chronic Conditions and Co-morbidities  Goal: Patient's chronic conditions and co-morbidity symptoms are monitored and maintained or improved  9/19/2024 0047 by Salud Barrett LPN  Outcome: Progressing  Flowsheets  Taken 9/19/2024 0017 by Salud Barrett LPN  Care Plan - Patient's Chronic Conditions and Co-Morbidity Symptoms are Monitored and Maintained or Improved: Monitor and assess patient's chronic conditions and comorbid symptoms for stability, deterioration, or improvement  Taken 9/18/2024 2000 by Wenceslao Love RN  Care Plan - Patient's Chronic Conditions and Co-Morbidity Symptoms are Monitored and Maintained or Improved: Monitor and assess patient's chronic conditions and comorbid symptoms for stability, deterioration, or improvement     Problem: Discharge Planning  Goal: Discharge to home or other facility with appropriate resources  9/19/2024 1412 by Jennie Nunez RN  Outcome: Progressing  9/19/2024 0047 by Salud Barrett LPN  Outcome: Progressing  Flowsheets  Taken 9/19/2024 0017 by Salud Barrett LPN  Discharge to home or other facility with appropriate resources: Identify barriers to discharge with patient and caregiver  Taken 9/18/2024 2000 by Wenceslao Love, RN  Discharge to home or other facility with appropriate resources: Identify barriers to discharge with patient and caregiver     Problem: Skin/Tissue Integrity  Goal: Absence of new skin breakdown  Description: 1.  Monitor for areas of redness and/or skin breakdown  2.  Assess vascular access sites hourly  3.  Every 4-6 hours minimum:  Change oxygen saturation probe site  4.  Every 4-6 hours:  If on nasal continuous positive airway pressure, respiratory therapy assess nares and determine need for appliance change or resting period.  9/19/2024 0047 by Salud Barrett LPN  Outcome: Progressing     Problem: ABCDS Injury Assessment  Goal: Absence of physical injury  9/19/2024 0047 by Salud Barrett LPN  Outcome: Progressing      Problem: Pain  Goal: Verbalizes/displays adequate comfort level or baseline comfort level  9/19/2024 1412 by Jennie Nunez RN  Outcome: Progressing  9/19/2024 0047 by Salud Barrett LPN  Outcome: Progressing  Flowsheets (Taken 9/18/2024 2000 by Wenceslao Love RN)  Verbalizes/displays adequate comfort level or baseline comfort level:   Encourage patient to monitor pain and request assistance   Assess pain using appropriate pain scale     Problem: Safety - Adult  Goal: Free from fall injury  9/19/2024 1412 by Jennie Nunez RN  Outcome: Progressing  9/19/2024 0047 by Salud Barrett LPN  Outcome: Progressing  Flowsheets (Taken 9/19/2024 0017)  Free From Fall Injury: Instruct family/caregiver on patient safety     Problem: Physical Therapy - Adult  Goal: By Discharge: Performs mobility at highest level of function for planned discharge setting.  See evaluation for individualized goals.  Description: FUNCTIONAL STATUS PRIOR TO ADMISSION: Patient was independent and active without use of DME.    HOME SUPPORT PRIOR TO ADMISSION: The patient lived alone with family & friends to provide assistance.    Physical Therapy Goals  Initiated 9/19/2024  1.  Patient will perform sit to stand with independence within 7 day(s).  2.  Patient will transfer from bed to chair and chair to bed with modified independence using the least restrictive device within 7 day(s).  3.  Patient will ambulate with modified independence for 250 feet with the least restrictive device within 7 day(s).   4.  Patient will ascend/descend 8 stairs with handrail(s) with modified independence within 7 day(s).  5.  Patient will complete initial Gunter Balance Scale & improve score by 7 points within 7 days.     9/19/2024 1246 by Niesha Grajeda, PT  Outcome: Progressing

## 2024-09-20 ENCOUNTER — TELEPHONE (OUTPATIENT)
Age: 59
End: 2024-09-20

## 2024-09-20 DIAGNOSIS — R53.1 WEAKNESS: ICD-10-CM

## 2024-09-20 DIAGNOSIS — I67.1 CEREBRAL ANEURYSM: Primary | ICD-10-CM

## 2024-09-27 ENCOUNTER — TELEPHONE (OUTPATIENT)
Age: 59
End: 2024-09-27

## 2024-09-27 DIAGNOSIS — I67.1 CEREBRAL ANEURYSM: Primary | ICD-10-CM

## 2024-09-30 ENCOUNTER — OFFICE VISIT (OUTPATIENT)
Age: 59
End: 2024-09-30
Payer: MEDICARE

## 2024-09-30 ENCOUNTER — HOSPITAL ENCOUNTER (EMERGENCY)
Facility: HOSPITAL | Age: 59
Discharge: HOME | End: 2024-09-30
Attending: EMERGENCY MEDICINE
Payer: MEDICARE

## 2024-09-30 VITALS
DIASTOLIC BLOOD PRESSURE: 78 MMHG | HEIGHT: 72 IN | SYSTOLIC BLOOD PRESSURE: 118 MMHG | TEMPERATURE: 97.6 F | HEART RATE: 81 BPM | WEIGHT: 264 LBS | OXYGEN SATURATION: 97 % | BODY MASS INDEX: 35.76 KG/M2

## 2024-09-30 VITALS
HEART RATE: 79 BPM | OXYGEN SATURATION: 100 % | BODY MASS INDEX: 35.76 KG/M2 | WEIGHT: 264 LBS | RESPIRATION RATE: 18 BRPM | TEMPERATURE: 97.3 F | HEIGHT: 72 IN | SYSTOLIC BLOOD PRESSURE: 125 MMHG | DIASTOLIC BLOOD PRESSURE: 79 MMHG

## 2024-09-30 DIAGNOSIS — R04.0 EPISTAXIS: Primary | ICD-10-CM

## 2024-09-30 DIAGNOSIS — I67.1 CEREBRAL ANEURYSM: Primary | ICD-10-CM

## 2024-09-30 LAB
ALBUMIN SERPL-MCNC: 3.5 G/DL (ref 3.5–5)
ALBUMIN/GLOB SERPL: 0.8 (ref 1.1–2.2)
ALP SERPL-CCNC: 117 U/L (ref 45–117)
ALT SERPL-CCNC: 23 U/L (ref 12–78)
ANION GAP SERPL CALC-SCNC: 3 MMOL/L (ref 2–12)
APTT PPP: 27.9 SEC (ref 22.1–31)
AST SERPL-CCNC: 10 U/L (ref 15–37)
BASOPHILS # BLD: 0.1 K/UL (ref 0–0.1)
BASOPHILS NFR BLD: 1 % (ref 0–1)
BILIRUB SERPL-MCNC: 0.4 MG/DL (ref 0.2–1)
BUN SERPL-MCNC: 14 MG/DL (ref 6–20)
BUN/CREAT SERPL: 17 (ref 12–20)
CALCIUM SERPL-MCNC: 9.6 MG/DL (ref 8.5–10.1)
CHLORIDE SERPL-SCNC: 108 MMOL/L (ref 97–108)
CO2 SERPL-SCNC: 29 MMOL/L (ref 21–32)
COMMENT:: NORMAL
CREAT SERPL-MCNC: 0.84 MG/DL (ref 0.55–1.02)
DIFFERENTIAL METHOD BLD: ABNORMAL
EOSINOPHIL # BLD: 0.2 K/UL (ref 0–0.4)
EOSINOPHIL NFR BLD: 3 % (ref 0–7)
ERYTHROCYTE [DISTWIDTH] IN BLOOD BY AUTOMATED COUNT: 15.8 % (ref 11.5–14.5)
GLOBULIN SER CALC-MCNC: 4.5 G/DL (ref 2–4)
GLUCOSE SERPL-MCNC: 94 MG/DL (ref 65–100)
HCT VFR BLD AUTO: 38 % (ref 35–47)
HGB BLD-MCNC: 12 G/DL (ref 11.5–16)
IMM GRANULOCYTES # BLD AUTO: 0 K/UL (ref 0–0.04)
IMM GRANULOCYTES NFR BLD AUTO: 1 % (ref 0–0.5)
INR PPP: 1 (ref 0.9–1.1)
LYMPHOCYTES # BLD: 2.4 K/UL (ref 0.8–3.5)
LYMPHOCYTES NFR BLD: 34 % (ref 12–49)
MCH RBC QN AUTO: 26.3 PG (ref 26–34)
MCHC RBC AUTO-ENTMCNC: 31.6 G/DL (ref 30–36.5)
MCV RBC AUTO: 83.3 FL (ref 80–99)
MONOCYTES # BLD: 0.5 K/UL (ref 0–1)
MONOCYTES NFR BLD: 7 % (ref 5–13)
NEUTS SEG # BLD: 3.8 K/UL (ref 1.8–8)
NEUTS SEG NFR BLD: 54 % (ref 32–75)
NRBC # BLD: 0 K/UL (ref 0–0.01)
NRBC BLD-RTO: 0 PER 100 WBC
PLATELET # BLD AUTO: 350 K/UL (ref 150–400)
PMV BLD AUTO: 8.9 FL (ref 8.9–12.9)
POTASSIUM SERPL-SCNC: 3.8 MMOL/L (ref 3.5–5.1)
PROT SERPL-MCNC: 8 G/DL (ref 6.4–8.2)
PROTHROMBIN TIME: 10.6 SEC (ref 9–11.1)
RBC # BLD AUTO: 4.56 M/UL (ref 3.8–5.2)
SODIUM SERPL-SCNC: 140 MMOL/L (ref 136–145)
SPECIMEN HOLD: NORMAL
THERAPEUTIC RANGE: NORMAL SECS (ref 58–77)
WBC # BLD AUTO: 7 K/UL (ref 3.6–11)

## 2024-09-30 PROCEDURE — G8417 CALC BMI ABV UP PARAM F/U: HCPCS | Performed by: NURSE PRACTITIONER

## 2024-09-30 PROCEDURE — 99283 EMERGENCY DEPT VISIT LOW MDM: CPT

## 2024-09-30 PROCEDURE — 85610 PROTHROMBIN TIME: CPT

## 2024-09-30 PROCEDURE — 99213 OFFICE O/P EST LOW 20 MIN: CPT | Performed by: NURSE PRACTITIONER

## 2024-09-30 PROCEDURE — G8427 DOCREV CUR MEDS BY ELIG CLIN: HCPCS | Performed by: NURSE PRACTITIONER

## 2024-09-30 PROCEDURE — 1036F TOBACCO NON-USER: CPT | Performed by: NURSE PRACTITIONER

## 2024-09-30 PROCEDURE — 80053 COMPREHEN METABOLIC PANEL: CPT

## 2024-09-30 PROCEDURE — 85025 COMPLETE CBC W/AUTO DIFF WBC: CPT

## 2024-09-30 PROCEDURE — 1111F DSCHRG MED/CURRENT MED MERGE: CPT | Performed by: NURSE PRACTITIONER

## 2024-09-30 PROCEDURE — 3017F COLORECTAL CA SCREEN DOC REV: CPT | Performed by: NURSE PRACTITIONER

## 2024-09-30 PROCEDURE — 85730 THROMBOPLASTIN TIME PARTIAL: CPT

## 2024-09-30 PROCEDURE — 36415 COLL VENOUS BLD VENIPUNCTURE: CPT

## 2024-09-30 ASSESSMENT — PAIN SCALES - GENERAL: PAINLEVEL_OUTOF10: 0

## 2024-09-30 NOTE — PATIENT INSTRUCTIONS
Follow up in 6 months, March 2025, after imaging is completed.  See attached paperwork to schedule imaging.  Have P2Y12 and Aspirin test done today. Will call with the results.

## 2024-09-30 NOTE — PROGRESS NOTES
Procedure follow up for Cerebral aneurysm.  Patient reports nose bleed since 8:00 am this morning.  Patient noted with bright red blood on tissues in both nostrils.  Reports bruising, discomfort and slight swelling right radial puncture site.  No new problems reported.

## 2024-09-30 NOTE — ED PROVIDER NOTES
[facility] EMERGENCY DEP  EMERGENCY DEPARTMENT ENCOUNTER      Patient Name: Andreea Aly  MRN: [id number]  Birthdate 1965  Date of Evaluation: 9/30/2024  Physician: Jus Huntley MD    CHIEF COMPLAINT       Chief Complaint   Patient presents with    Epistaxis       HISTORY OF PRESENT ILLNESS   (Location/Symptom, Timing/Onset, Context/Setting, Quality, Duration, Modifying Factors, Severity)   Andreea Aly, 59 y.o., female     59-year-old female presents with a chief complaint of epistaxis.  She is not on any blood thinners          Nursing Notes were reviewed.    REVIEW OF SYSTEMS    (Not required)   Review of Systems    Except as noted above the remainder of the review of systems was reviewed and negative.     PAST MEDICAL HISTORY     Past Medical History:   Diagnosis Date    Acute bronchitis Jan 2015    Acute sinusitis     Anxiety     Arthritis     Asthma     Back pain     Hernandez's cyst 06/10/2024    Chest pain     Delivery normal     x4    Dizziness     Fatigue     Fractures     Gastrointestinal disorder     \"stomach ulcer\"    History of blood transfusion 2007    ABD. BLEEDING    Hyperlipidemia     NON COMPLIANT W/ MEDICATION    Hypertension     CHALINO on CPAP     Osteoarthritis     Other ill-defined conditions(799.89)     anemia    Palpitations     Peptic ulcer disease     Ringing in ears     Sciatica     Skipped beats     Stroke (HCC)     WEAKNESS ON LEFT SIDE OF BODY    TIA (transient ischemic attack) 04/21/2023       SURGICAL HISTORY       Past Surgical History:   Procedure Laterality Date    CEREBRAL EMBOLIZATION  04/25/2024    COLONOSCOPY      ENDOSCOPY, COLON, DIAGNOSTIC      EYE SURGERY Bilateral     BLEPHOROPLASTY    OR UNLISTED PROCEDURE ABDOMEN PERITONEUM & OMENTUM      FOR BLEEDING ULCER    SINUS SURGERY      TONSILLECTOMY      TUBAL LIGATION         CURRENT MEDICATIONS       Discharge Medication List as of 9/30/2024  3:30 PM        CONTINUE these medications which have NOT CHANGED    Details

## 2024-09-30 NOTE — PROGRESS NOTES
Clinic Note      Patient: Andreea Aly MRN: [id number]  SSN: [id number]    YOB: 1965  Age: 59 y.o.  Sex: female      Subjective:      Andreea Aly is a 59 y.o. female established patient with history of CHALINO, HTN, asthma, TIA, and multiple cerebral aneurysms. She is s/p RMCA bifurcation aneurysm stent assisted coiling in April 2024 by Dr Meneses. She then underwent Flow diversion stent embolization of right supraclinoid ICA  aneurysm 9/17/24 by Dr Meneses. She is on ASA 81 mg daily + Brilinta 30 mg BID. During her admission, her Brilinta dose was decreased from 60 mg BID to 30 mg BID due to supratherapeutic assays.     Pt has been having intermittent epistaxis since discharge from the hospital. She called the office 9/27/24 to report epistaxis, ENT referral was placed but she has not yet made an appt.     Pt noted with blood trickling from both nostrils on arrival to the clinic. Pt stating she has been bleeding persistently since 0800 this morning.     Past Medical History:   Diagnosis Date    Acute bronchitis Jan 2015    Acute sinusitis     Anxiety     Arthritis     Asthma     Back pain     Hernandez's cyst 06/10/2024    Chest pain     Delivery normal     x4    Dizziness     Fatigue     Fractures     Gastrointestinal disorder     \"stomach ulcer\"    History of blood transfusion 2007    ABD. BLEEDING    Hyperlipidemia     NON COMPLIANT W/ MEDICATION    Hypertension     CHALINO on CPAP     Osteoarthritis     Other ill-defined conditions(799.89)     anemia    Palpitations     Peptic ulcer disease     Ringing in ears     Sciatica     Skipped beats     Stroke (HCC)     WEAKNESS ON LEFT SIDE OF BODY    TIA (transient ischemic attack) 04/21/2023     Past Surgical History:   Procedure Laterality Date    CEREBRAL EMBOLIZATION  04/25/2024    COLONOSCOPY      ENDOSCOPY, COLON, DIAGNOSTIC      EYE SURGERY Bilateral     BLEPHOROPLASTY    MA UNLISTED PROCEDURE ABDOMEN PERITONEUM & OMENTUM      FOR

## 2024-09-30 NOTE — ED NOTES
12:18 PM  I have evaluated the patient as the Provider in Rapid Medical Evaluation (RME). I have reviewed her vital signs and the triage nurse assessment. I have talked with the patient and any available family and advised that I am the provider in triage and have ordered the appropriate study to initiate their work up based on the clinical presentation during my assessment. I have advised that the patient will be accommodated in the Main ED as soon as possible. I have also requested to contact the triage nurse or myself immediately if the patient experiences any changes in their condition during this brief waiting period.    Recent surgery - 9/17 for cerebral aneurysm - followed up with neuro interventional today, has been having nosebleeds, sent to the ED.  Mike and ASA.      JOSEE Goodson Lindsay H, PA  09/30/24 [id number]

## 2024-09-30 NOTE — ED TRIAGE NOTES
Pt arrived to the ER in a wheelchair with CC intermittent nosebleed since 0800 this AM. Surgery 9/17. Sent from doctor's appointment.     +Brilinta & aspirin

## 2024-10-02 ENCOUNTER — TELEPHONE (OUTPATIENT)
Age: 59
End: 2024-10-02

## 2024-10-02 DIAGNOSIS — I67.1 CEREBRAL ANEURYSM: Primary | ICD-10-CM

## 2024-10-02 NOTE — PROGRESS NOTES
2016     Years since quittin.5     Passive exposure: Past    Smokeless tobacco: Never   Substance Use Topics    Alcohol use: No      Current Outpatient Medications   Medication Sig Dispense Refill    diclofenac sodium (VOLTAREN) 1 % GEL       butalbital-acetaminophen-caffeine (FIORICET, ESGIC) -40 MG per tablet Take 1 tablet by mouth every 4 hours as needed for Headaches or Migraine      dilTIAZem (TIADYLT ER) 120 MG extended release capsule Take 1 capsule by mouth daily      ALPRAZolam (XANAX) 1 MG tablet Take 1 tablet by mouth 3 times daily as needed for Anxiety.      ticagrelor (BRILINTA) 60 MG TABS tablet Take 0.5 tablets by mouth 2 times daily 30 tablet 5    pantoprazole (PROTONIX) 20 MG tablet Take 1 tablet by mouth daily      fluticasone-salmeterol (ADVAIR HFA) 45-21 MCG/ACT inhaler Inhale 2 puffs into the lungs 2 times daily      aspirin 81 MG EC tablet Take 1 tablet by mouth daily 30 tablet 5    albuterol sulfate HFA (PROVENTIL;VENTOLIN;PROAIR) 108 (90 Base) MCG/ACT inhaler Inhale 2 puffs into the lungs every 4 hours as needed for Wheezing or Shortness of Breath       No current facility-administered medications for this visit.        Allergies   Allergen Reactions    Penicillins Anaphylaxis     Has taken cephalexin without problem    Hydrocodone Itching       Review of Systems:  Pertinent items are noted in the History of Present Illness.    Objective:     Vitals:    24 1049   BP: 120/80   Site: Right Lower Arm   Position: Sitting   Cuff Size: Large Adult   Temp: 97.4 °F (36.3 °C)   TempSrc: Temporal   SpO2: 97%   Weight: 125.6 kg (277 lb)   Height: 1.829 m (6')        Physical Exam:  GENERAL: alert, cooperative, no distress, appears stated age  LUNG: clear to auscultation bilaterally  HEART: regular rate and rhythm  EXTREMITIES:  extremities normal, atraumatic, no cyanosis or edema    Neurologic Exam:  Mental Status:  Alert and oriented x 4.  Appropriate affect, mood and behavior.

## 2024-10-02 NOTE — TELEPHONE ENCOUNTER
I returned patient's call.   As requested I gave her the name and phone number of the ENT we referred her to.  I faxed this referral to Dr. Aldridge's office today.     NIS noticed that the patient never had her P2Y12 and Aspirin test done while in the ER.  New order was place. Per patient, she will come in on 10/3/2024 to have the above labs drawn.    Patient also stated that she hasn't had a bloody nose since Monday

## 2024-10-03 DIAGNOSIS — I67.1 CEREBRAL ANEURYSM: ICD-10-CM

## 2024-10-03 LAB
ASPIRIN: 473 ARU
P2Y12 PLT RESPONSE: 21 PRU (ref 194–418)

## 2024-10-03 NOTE — PROGRESS NOTES
repetition, comprehension and naming.    Cranial Nerves:   EOMI, PERRLA      Facial sensation intact V1 - V3.     Full facial strength, no asymmetry.      Hearing intact bilaterally.     No dysarthria. Tongue protrudes to midline  symmetrically.      Shoulder shrug 5/5 bilaterally.    Motor:    No pronator drift.      Bulk and tone normal.      5/5 power in all extremities proximally and distally.     No involuntary movements.    Sensation:    Sensation intact throughout to light touch    Reflexes:    Deferred    Coordination & Gait: Normal      My interpretation of Imaging:       The CT angiogram demonstrated a right middle cerebral artery bifurcation aneurysm measuring approximately 4 mm and a ventral supraclinoid right ICA aneurysm measuring approximately 3 mm.    Assessment:     Given the findings of the CTA, I recommend a catheter arteriogram for further assessment of these findings.    In accordance with the patient's symptoms and imaging findings, i recommended that we perform a diagnostic cervico-cerebral angiogram to further evaluate and characterize for intracranial and extracranial vascular pathology.     I have discussed the risks, benefits and alternatives of the procedure with the patient in detail. The risks discussed included but were not limited to bleeding, infection, blood vessel damage/dissection, stroke with transient or permanent weakness, numbness or other deficit, speech and language dysfunction, vision loss, brain damage and death. Potential need for more surgery or interventions was also discussed. Risk of renal failure from contrast was also discussed. Patient understood, agreed and signed the consent form.    The patient questions were answered thoroughly. The patient expressed understanding and elected to proceed with endovascular diagnostic cervico-cerebral angiogram. The patient consented for the procedure.     Plan:     Catheter cerebral arteriogram.    Thank you for allowing me to

## 2024-10-03 NOTE — PROGRESS NOTES
Neurointerventional Surgery Clinic Note        Patient: Andreea Aly MRN: [id number]  SSN: [id number]    YOB: 1965  Age: 59 y.o.  Sex: female      Subjective:      Andreea Aly is a 59 y.o. female who is being seen for follow-up.  Patient underwent catheter arteriogram on 3/14/2024 for assessment of the right MCA bifurcation and supraclinoid ICA aneurysms.    Past Medical History:   Diagnosis Date    Acute bronchitis Jan 2015    Acute sinusitis     Anxiety     Arthritis     Asthma     Back pain     Hernandez's cyst 06/10/2024    Chest pain     Delivery normal     x4    Dizziness     Fatigue     Fractures     Gastrointestinal disorder     \"stomach ulcer\"    History of blood transfusion 2007    ABD. BLEEDING    Hyperlipidemia     NON COMPLIANT W/ MEDICATION    Hypertension     CHALINO on CPAP     Osteoarthritis     Other ill-defined conditions(799.89)     anemia    Palpitations     Peptic ulcer disease     Ringing in ears     Sciatica     Skipped beats     Stroke (HCC)     WEAKNESS ON LEFT SIDE OF BODY    TIA (transient ischemic attack) 04/21/2023     Past Surgical History:   Procedure Laterality Date    CEREBRAL EMBOLIZATION  04/25/2024    COLONOSCOPY      ENDOSCOPY, COLON, DIAGNOSTIC      EYE SURGERY Bilateral     BLEPHOROPLASTY    LA UNLISTED PROCEDURE ABDOMEN PERITONEUM & OMENTUM      FOR BLEEDING ULCER    SINUS SURGERY      TONSILLECTOMY      TUBAL LIGATION        Family History   Problem Relation Age of Onset    Stroke Mother     Heart Disease Mother     Lymphoma Mother     Cerebral Aneurysm Mother     Severe Sprains Mother     Diabetes Father     Lupus Sister     Breast Cancer Sister     Cancer Brother     Hypertension Brother     No Known Problems Brother     Lupus Other     Anesth Problems Neg Hx      Social History     Tobacco Use    Smoking status: Former     Current packs/day: 0.00     Average packs/day: 0.5 packs/day for 14.3 years (7.1 ttl pk-yrs)     Types: Cigarettes     Start date: 2002

## 2024-10-08 ENCOUNTER — HOSPITAL ENCOUNTER (OUTPATIENT)
Facility: HOSPITAL | Age: 59
Setting detail: RECURRING SERIES
Discharge: HOME | End: 2024-10-11
Attending: RADIOLOGY
Payer: MEDICARE

## 2024-10-08 PROCEDURE — 97161 PT EVAL LOW COMPLEX 20 MIN: CPT

## 2024-10-08 PROCEDURE — 97110 THERAPEUTIC EXERCISES: CPT

## 2024-10-08 NOTE — THERAPY EVALUATION
Physical Therapy at [facility],   a part of [facility]  [address] [address]  Phone:[phone] Fax:[phone]                                                                            PHYSICAL THERAPY - MEDICARE EVALUATION/PLAN OF CARE NOTE (updated 3/23)      Date: 10/8/2024          Patient Name:  Andreea Aly :  1965   Medical   Diagnosis:  Cerebral aneurysm [I67.1]  Weakness [R53.1] Treatment Diagnosis:  M62.81  GENERAL MUSCLE WEAKNESS and R26.89   Abnormalities of gait and mobility    Referral Source:  Arturo Meneses MD Provider #:  [id number]                  Insurance: Payor: [facility] MEDICARE / Plan: [employer] DUAL COMPLETE / Product Type: *No Product type* /      Patient  verified yes     Visit #   Current  / Total 1 24   Time   In / Out 1030 A 11:10 A   Total Treatment Time 40   Total Timed Codes 15   1:1 Treatment Time 15      [facility] Totals Reminder:  bill using total billable   min of TIMED therapeutic procedures and modalities.   8-22 min = 1 unit; 23-37 min = 2 units; 38-52 min = 3 units;  53-67 min = 4 units; 68-82 min = 5 units           SUBJECTIVE  Pain Level (0-10 scale): 8-- knees, lower back, shoulders  []constant []intermittent []improving []worsening []no change since onset    Any medication changes, allergies to medications, adverse drug reactions, diagnosis change, or new procedure performed?: [x] No    [] Yes (see summary sheet for update)  Medications: Verified on Patient Summary List    Subjective functional status/changes:     Pt presents with referral for cerebral aneurysm, weakness. Reports she has had two surgeries in the past year (April, 2024) to remove aneurysms. \"Wobbly and off balance\" since most recent surgery a few weeks ago. She has been using a quad cane when she goes out for walks/in the community. Walks with hands along furniture, walls at home. Denies falls within the past 3 months; however \"a

## 2024-10-10 ENCOUNTER — HOSPITAL ENCOUNTER (OUTPATIENT)
Facility: HOSPITAL | Age: 59
Setting detail: RECURRING SERIES
Discharge: HOME | End: 2024-10-13
Attending: RADIOLOGY
Payer: MEDICARE

## 2024-10-10 PROCEDURE — 97110 THERAPEUTIC EXERCISES: CPT

## 2024-10-10 PROCEDURE — 97112 NEUROMUSCULAR REEDUCATION: CPT

## 2024-10-10 NOTE — PROGRESS NOTES
PHYSICAL THERAPY - MEDICARE DAILY TREATMENT NOTE (updated 3/23)      Date: 10/10/2024          Patient Name:  Andreea Aly :  1965   Medical   Diagnosis:  Cerebral aneurysm [I67.1]  Weakness [R53.1] Treatment Diagnosis:  M62.81  GENERAL MUSCLE WEAKNESS and R26.89   Abnormalities of gait and mobility    Referral Source:  Arturo Meneses MD Insurance:   Payor: [facility] MEDICARE / Plan: [employer] DUAL COMPLETE / Product Type: *No Product type* /                     Patient  verified yes     Visit #   Current  / Total 2 24   Time   In / Out 1:20 P 2:05 P   Total Treatment Time 45   Total Timed Codes 38   1:1 Treatment Time 38      [facility] Totals Reminder:  bill using total billable   min of TIMED therapeutic procedures and modalities.   8-22 min = 1 unit; 23-37 min = 2 units; 38-52 min = 3 units; 53-67 min = 4 units; 68-82 min = 5 units            SUBJECTIVE    Pain Level (0-10 scale): 7    Any medication changes, allergies to medications, adverse drug reactions, diagnosis change, or new procedure performed?: [x] No    [] Yes (see summary sheet for update)  Medications: Verified on Patient Summary List    Subjective functional status/changes:     B knees and lower back hurting today.     OBJECTIVE      Therapeutic Procedures:  Tx Min Billable or 1:1 Min (if diff from Tx Min) Procedure, Rationale, Specifics   35 71 11443 Therapeutic Exercise (timed):  increase ROM, strength, coordination, balance, and proprioception to improve patient's ability to progress to PLOF and address remaining functional goals. (see flow sheet as applicable)     Details if applicable:     10 10 15199 Neuromuscular Re-Education (timed):  improve balance, coordination, kinesthetic sense, posture, core stability and proprioception to improve patient's ability to develop conscious control of individual muscles and awareness of position of extremities in order to progress to PLOF and address remaining functional goals. (see flow sheet

## 2024-10-25 DIAGNOSIS — I67.1 CEREBRAL ANEURYSM: ICD-10-CM

## 2024-10-25 RX ORDER — CLOPIDOGREL BISULFATE 75 MG/1
37.5 TABLET ORAL DAILY
Qty: 45 TABLET | Refills: 1 | OUTPATIENT
Start: 2024-10-25

## 2024-12-06 DIAGNOSIS — I67.1 CEREBRAL ANEURYSM: ICD-10-CM

## 2024-12-09 RX ORDER — CLOPIDOGREL BISULFATE 75 MG/1
37.5 TABLET ORAL DAILY
Qty: 45 TABLET | Refills: 1 | OUTPATIENT
Start: 2024-12-09

## 2025-01-24 DIAGNOSIS — I67.1 CEREBRAL ANEURYSM: ICD-10-CM

## 2025-01-27 RX ORDER — CLOPIDOGREL BISULFATE 75 MG/1
37.5 TABLET ORAL DAILY
Qty: 45 TABLET | Refills: 1 | OUTPATIENT
Start: 2025-01-27

## 2025-02-03 ENCOUNTER — TELEPHONE (OUTPATIENT)
Age: 60
End: 2025-02-03

## 2025-02-03 NOTE — TELEPHONE ENCOUNTER
JUANI:    Patient called asking when she was supposed to follow up with Dr. Meneses/Cathryn.   I gave her the phone number to central scheduling to schedule her MRA that is due in March.  I asked to call back once she has scheduled the imaging so we can schedule her office follow up.    Per patient she was never contacted by Dr. Aldridge's office and has not followed up regarding her nose bleeds.  I gave her the phone number to Dr. Aldridge's office for her to schedule. I let her know that I will refax the referral.     Patient didn't express that she was having any current issues.  Patient states that she will need sedation prior to having her MRA.

## 2025-02-04 NOTE — TELEPHONE ENCOUNTER
Patient called earlier today letting us know that she was unable to schedule her MRA.  Per Central Scheduling, an implant form needs to be sent to the MRI department to verify implant compatibility.The patient is not able to schedule an MRI/MRA without this step being done.     After hearing the above I spoke to Central Scheduling, who confirmed that this was indeed policy.  I had never encountered this issue before and requested a callback from the Manager or Supervisor.    I would like to know if there is anything that can be done upfront to make this process smoother for the patient.   I would also like to know if something has changed with this process, as I have not taken a call about this issue in the past.

## 2025-02-19 ENCOUNTER — TELEPHONE (OUTPATIENT)
Age: 60
End: 2025-02-19

## 2025-02-19 NOTE — TELEPHONE ENCOUNTER
Pt called and she wanted to ask if she could get some medication to take before she does her imaging because is does not like the machine they put her in. Please advise.

## 2025-02-25 DIAGNOSIS — I67.1 CEREBRAL ANEURYSM: Primary | ICD-10-CM

## 2025-02-25 RX ORDER — LORAZEPAM 0.5 MG/1
TABLET ORAL
Qty: 1 TABLET | Refills: 0 | OUTPATIENT
Start: 2025-02-25 | End: 2025-03-04

## 2025-03-04 ENCOUNTER — HOSPITAL ENCOUNTER (OUTPATIENT)
Facility: HOSPITAL | Age: 60
Discharge: HOME | End: 2025-03-07
Payer: MEDICARE

## 2025-03-04 DIAGNOSIS — I67.1 CEREBRAL ANEURYSM: ICD-10-CM

## 2025-03-04 PROCEDURE — 70544 MR ANGIOGRAPHY HEAD W/O DYE: CPT

## 2025-03-24 ENCOUNTER — OFFICE VISIT (OUTPATIENT)
Age: 60
End: 2025-03-24
Payer: MEDICARE

## 2025-03-24 VITALS
SYSTOLIC BLOOD PRESSURE: 110 MMHG | OXYGEN SATURATION: 98 % | HEIGHT: 72 IN | TEMPERATURE: 97.7 F | WEIGHT: 283 LBS | HEART RATE: 96 BPM | DIASTOLIC BLOOD PRESSURE: 72 MMHG | BODY MASS INDEX: 38.33 KG/M2

## 2025-03-24 DIAGNOSIS — I67.1 CEREBRAL ANEURYSM: Primary | ICD-10-CM

## 2025-03-24 PROCEDURE — 99213 OFFICE O/P EST LOW 20 MIN: CPT | Performed by: NURSE PRACTITIONER

## 2025-03-24 PROCEDURE — G8417 CALC BMI ABV UP PARAM F/U: HCPCS | Performed by: NURSE PRACTITIONER

## 2025-03-24 PROCEDURE — 1036F TOBACCO NON-USER: CPT | Performed by: NURSE PRACTITIONER

## 2025-03-24 PROCEDURE — G8427 DOCREV CUR MEDS BY ELIG CLIN: HCPCS | Performed by: NURSE PRACTITIONER

## 2025-03-24 PROCEDURE — 3017F COLORECTAL CA SCREEN DOC REV: CPT | Performed by: NURSE PRACTITIONER

## 2025-03-24 NOTE — PROGRESS NOTES
HTN, asthma, TIA, and multiple cerebral aneurysms. She is s/p RMCA bifurcation aneurysm stent assisted coiling in April 2024 by Dr Meneses. She then underwent Flow diversion stent embolization of right supraclinoid ICA  aneurysm 9/17/24 by Dr Meneses. She is on ASA 81 mg daily + Brilinta 30 mg BID. During her admission, her Brilinta dose was decreased from 60 mg BID to 30 mg BID due to supratherapeutic assays.     We reviewed the results of the prior imaging studies. The most recent MRA brain shows a patent LASHANDA flow diversion stent with no residual or recurrent sac filling. Neuro exam today is normal. Discussed with pt that her bruising will improve once we stop the Brilinta.      Plan:     Follow up MRA head WO in 3 years and RTC.     Reviewed today with the patient:   - Stop Brilinta today, will cont with ASA monotherapy indefinitely   - BEFAST, call 911 if symptoms present   - Strict BP control (less than 140/90 long term)   - Call 911 for \"worse headache of life\" or new neurologic symptoms   - Avoid straining    I have spent at least 20 minutes reviewing previous notes, test results and face to face (virtual) with the patient discussing the diagnosis and importance of compliance with the treatment plan as well as documenting on the day of the visit.    Signed By: TORI Forrester NP     March 24, 2025

## 2025-03-24 NOTE — PATIENT INSTRUCTIONS
Follow up in March 2028 after imaging is completed.  See attached paperwork to schedule imaging.  Stop taking Brilinta.  Continue Aspirin as ordered.

## 2025-03-29 DIAGNOSIS — I67.1 CEREBRAL ANEURYSM: ICD-10-CM

## 2025-03-31 RX ORDER — SALICYLIC ACID 40 %
81 ADHESIVE PATCH, MEDICATED TOPICAL DAILY
Qty: 30 TABLET | Refills: 4 | Status: SHIPPED | OUTPATIENT
Start: 2025-03-31

## 2025-04-03 ENCOUNTER — TELEPHONE (OUTPATIENT)
Age: 60
End: 2025-04-03

## 2025-04-03 NOTE — TELEPHONE ENCOUNTER
Patient calling to schedule appt with Dr. Dykes. She swears up and down that she saw him in office before but I dont see anything in chart.    Please call her back at .  Thank you.

## 2025-04-29 ENCOUNTER — TELEPHONE (OUTPATIENT)
Age: 60
End: 2025-04-29

## 2025-04-29 NOTE — TELEPHONE ENCOUNTER
Received a call from patient regarding head pain.  Patient reports pain on touch at the top of her head. Pain mostly to the right side.   Reports pain level 9/10.  Reports blurred vision in right eye.  Denies headaches, dizziness, numbness or tingling.  No other complaints voiced.  Advised patient to schedule appointment with Neurology.  Stated she was not going back to [facility].  Asked manager at [facility] Neurology to see if patient can transfer to [facility].  Patient advised to seek urgent care.  Patient stated understanding.

## 2025-06-29 DIAGNOSIS — I67.1 CEREBRAL ANEURYSM: ICD-10-CM

## 2025-06-30 ENCOUNTER — ANESTHESIA EVENT (OUTPATIENT)
Facility: HOSPITAL | Age: 60
End: 2025-06-30
Payer: MEDICARE

## 2025-06-30 ENCOUNTER — ANESTHESIA (OUTPATIENT)
Facility: HOSPITAL | Age: 60
End: 2025-06-30
Payer: MEDICARE

## 2025-06-30 ENCOUNTER — HOSPITAL ENCOUNTER (OUTPATIENT)
Facility: HOSPITAL | Age: 60
Setting detail: OUTPATIENT SURGERY
Discharge: HOME | End: 2025-06-30
Attending: STUDENT IN AN ORGANIZED HEALTH CARE EDUCATION/TRAINING PROGRAM | Admitting: STUDENT IN AN ORGANIZED HEALTH CARE EDUCATION/TRAINING PROGRAM
Payer: MEDICARE

## 2025-06-30 VITALS
WEIGHT: 277 LBS | DIASTOLIC BLOOD PRESSURE: 68 MMHG | BODY MASS INDEX: 37.52 KG/M2 | HEART RATE: 72 BPM | HEIGHT: 72 IN | SYSTOLIC BLOOD PRESSURE: 132 MMHG | TEMPERATURE: 97.7 F | RESPIRATION RATE: 17 BRPM | OXYGEN SATURATION: 100 %

## 2025-06-30 PROCEDURE — 3600007512: Performed by: STUDENT IN AN ORGANIZED HEALTH CARE EDUCATION/TRAINING PROGRAM

## 2025-06-30 PROCEDURE — 7100000011 HC PHASE II RECOVERY - ADDTL 15 MIN: Performed by: STUDENT IN AN ORGANIZED HEALTH CARE EDUCATION/TRAINING PROGRAM

## 2025-06-30 PROCEDURE — 3600007502: Performed by: STUDENT IN AN ORGANIZED HEALTH CARE EDUCATION/TRAINING PROGRAM

## 2025-06-30 PROCEDURE — 2500000003 HC RX 250 WO HCPCS

## 2025-06-30 PROCEDURE — 3700000001 HC ADD 15 MINUTES (ANESTHESIA): Performed by: STUDENT IN AN ORGANIZED HEALTH CARE EDUCATION/TRAINING PROGRAM

## 2025-06-30 PROCEDURE — 7100000010 HC PHASE II RECOVERY - FIRST 15 MIN: Performed by: STUDENT IN AN ORGANIZED HEALTH CARE EDUCATION/TRAINING PROGRAM

## 2025-06-30 PROCEDURE — 88305 TISSUE EXAM BY PATHOLOGIST: CPT

## 2025-06-30 PROCEDURE — 6360000002 HC RX W HCPCS

## 2025-06-30 PROCEDURE — 3700000000 HC ANESTHESIA ATTENDED CARE: Performed by: STUDENT IN AN ORGANIZED HEALTH CARE EDUCATION/TRAINING PROGRAM

## 2025-06-30 PROCEDURE — 2709999900 HC NON-CHARGEABLE SUPPLY: Performed by: STUDENT IN AN ORGANIZED HEALTH CARE EDUCATION/TRAINING PROGRAM

## 2025-06-30 PROCEDURE — 2580000003 HC RX 258

## 2025-06-30 RX ORDER — PHENYLEPHRINE HCL IN 0.9% NACL 0.4MG/10ML
SYRINGE (ML) INTRAVENOUS
Status: DISCONTINUED | OUTPATIENT
Start: 2025-06-30 | End: 2025-06-30 | Stop reason: SDUPTHER

## 2025-06-30 RX ORDER — SODIUM CHLORIDE 0.9 % (FLUSH) 0.9 %
5-40 SYRINGE (ML) INJECTION EVERY 12 HOURS SCHEDULED
Status: DISCONTINUED | OUTPATIENT
Start: 2025-06-30 | End: 2025-06-30 | Stop reason: HOSPADM

## 2025-06-30 RX ORDER — LIDOCAINE HYDROCHLORIDE 20 MG/ML
INJECTION, SOLUTION EPIDURAL; INFILTRATION; INTRACAUDAL; PERINEURAL
Status: DISCONTINUED | OUTPATIENT
Start: 2025-06-30 | End: 2025-06-30 | Stop reason: SDUPTHER

## 2025-06-30 RX ORDER — SODIUM CHLORIDE 9 MG/ML
INJECTION, SOLUTION INTRAVENOUS
Status: DISCONTINUED | OUTPATIENT
Start: 2025-06-30 | End: 2025-06-30 | Stop reason: SDUPTHER

## 2025-06-30 RX ORDER — EPHEDRINE SULFATE/0.9% NACL/PF 50 MG/5 ML
SYRINGE (ML) INTRAVENOUS
Status: DISCONTINUED | OUTPATIENT
Start: 2025-06-30 | End: 2025-06-30 | Stop reason: SDUPTHER

## 2025-06-30 RX ORDER — SODIUM CHLORIDE 0.9 % (FLUSH) 0.9 %
5-40 SYRINGE (ML) INJECTION PRN
Status: DISCONTINUED | OUTPATIENT
Start: 2025-06-30 | End: 2025-06-30 | Stop reason: HOSPADM

## 2025-06-30 RX ORDER — SODIUM CHLORIDE 9 MG/ML
INJECTION, SOLUTION INTRAVENOUS CONTINUOUS
Status: DISCONTINUED | OUTPATIENT
Start: 2025-06-30 | End: 2025-06-30 | Stop reason: HOSPADM

## 2025-06-30 RX ORDER — ASPIRIN 81 MG/1
81 TABLET, COATED ORAL DAILY
Qty: 90 TABLET | Refills: 1 | Status: SHIPPED | OUTPATIENT
Start: 2025-06-30

## 2025-06-30 RX ORDER — SODIUM CHLORIDE 9 MG/ML
INJECTION, SOLUTION INTRAVENOUS PRN
Status: DISCONTINUED | OUTPATIENT
Start: 2025-06-30 | End: 2025-06-30 | Stop reason: HOSPADM

## 2025-06-30 RX ADMIN — SODIUM CHLORIDE: 9 INJECTION, SOLUTION INTRAVENOUS at 13:50

## 2025-06-30 RX ADMIN — Medication 80 MCG: at 14:02

## 2025-06-30 RX ADMIN — PROPOFOL 30 MG: 10 INJECTION, EMULSION INTRAVENOUS at 14:07

## 2025-06-30 RX ADMIN — PROPOFOL 30 MG: 10 INJECTION, EMULSION INTRAVENOUS at 14:10

## 2025-06-30 RX ADMIN — PROPOFOL 20 MG: 10 INJECTION, EMULSION INTRAVENOUS at 14:12

## 2025-06-30 RX ADMIN — Medication 80 MCG: at 14:14

## 2025-06-30 RX ADMIN — PROPOFOL 50 MG: 10 INJECTION, EMULSION INTRAVENOUS at 14:01

## 2025-06-30 RX ADMIN — PROPOFOL 50 MG: 10 INJECTION, EMULSION INTRAVENOUS at 13:57

## 2025-06-30 RX ADMIN — Medication 80 MCG: at 14:08

## 2025-06-30 RX ADMIN — PROPOFOL 50 MG: 10 INJECTION, EMULSION INTRAVENOUS at 13:56

## 2025-06-30 RX ADMIN — LIDOCAINE HYDROCHLORIDE 40 MG: 20 INJECTION, SOLUTION EPIDURAL; INFILTRATION; INTRACAUDAL; PERINEURAL at 13:56

## 2025-06-30 RX ADMIN — Medication 10 MG: at 14:14

## 2025-06-30 RX ADMIN — PROPOFOL 30 MG: 10 INJECTION, EMULSION INTRAVENOUS at 14:04

## 2025-06-30 RX ADMIN — Medication 5 MG: at 14:08

## 2025-06-30 ASSESSMENT — LIFESTYLE VARIABLES: SMOKING_STATUS: 1

## 2025-06-30 ASSESSMENT — PAIN - FUNCTIONAL ASSESSMENT: PAIN_FUNCTIONAL_ASSESSMENT: NONE - DENIES PAIN

## 2025-06-30 NOTE — ANESTHESIA PRE PROCEDURE
Department of Anesthesiology  Preprocedure Note       Name:  Andreea Aly   Age:  60 y.o.  :  1965                                          MRN:  [id number]         Date:  2025      Surgeon: Surgeon(s):  Zeyad Wick DO    Procedure: Procedure(s):  COLONOSCOPY BIOPSY    Medications prior to admission:   Prior to Admission medications    Medication Sig Start Date End Date Taking? Authorizing Provider   Fluticasone-Umeclidin-Vilant (TRELEGY ELLIPTA IN) Inhale 1 puff into the lungs every morning   Yes Provider, MD Elda   diclofenac sodium (VOLTAREN) 1 % GEL Apply topically as needed knee 18  Yes ProviderElda MD   aspirin (ASPIRIN LOW DOSE) 81 MG EC tablet TAKE 1 TABLET BY MOUTH EVERY DAY 25   Arturo Meneses MD   pantoprazole (PROTONIX) 20 MG tablet Take 1 tablet by mouth daily    Provider, MD Elda   butalbital-acetaminophen-caffeine (FIORICET, ESGIC) -40 MG per tablet Take 1 tablet by mouth every 4 hours as needed for Headaches or Migraine    Provider, MD Elda   dilTIAZem (TIADYLT ER) 120 MG extended release capsule Take 1 capsule by mouth daily    Provider, MD Elda   albuterol sulfate HFA (PROVENTIL;VENTOLIN;PROAIR) 108 (90 Base) MCG/ACT inhaler Inhale 2 puffs into the lungs every 4 hours as needed for Wheezing or Shortness of Breath 19   Automatic Reconciliation, Ar   ALPRAZolam (XANAX) 1 MG tablet Take 1 tablet by mouth 3 times daily as needed for Anxiety.    Automatic Reconciliation, Ar       Current medications:    No current facility-administered medications for this encounter.     Current Outpatient Medications   Medication Sig Dispense Refill    Fluticasone-Umeclidin-Vilant (TRELEGY ELLIPTA IN) Inhale 1 puff into the lungs every morning      diclofenac sodium (VOLTAREN) 1 % GEL Apply topically as needed knee      aspirin (ASPIRIN LOW DOSE) 81 MG EC tablet TAKE 1 TABLET BY MOUTH EVERY DAY 90 tablet 1    pantoprazole (PROTONIX) 20

## 2025-06-30 NOTE — ANESTHESIA POSTPROCEDURE EVALUATION
Department of Anesthesiology  Postprocedure Note    Patient: Andreea Aly  MRN: [id number]  YOB: 1965  Date of evaluation: 6/30/2025    Procedure Summary       Date: 06/30/25 Room / Location: [facility] ENDO 04 / [facility] ENDOSCOPY    Anesthesia Start: 1350 Anesthesia Stop: 1417    Procedure: COLONOSCOPY BIOPSY (Lower GI Region) Diagnosis:       Hx of colonic polyps      (Hx of colonic polyps [Z86.0100])    Surgeons: Zeyad Wick DO Responsible Provider: Az Romero MD    Anesthesia Type: MAC ASA Status: 3            Anesthesia Type: MAC    Aniceto Phase I: Aniceto Score: 10    Aniceto Phase II:      Anesthesia Post Evaluation    Patient location during evaluation: bedside  Patient participation: complete - patient participated  Level of consciousness: awake  Pain score: 0  Airway patency: patent  Nausea & Vomiting: no nausea and no vomiting  Cardiovascular status: hemodynamically stable  Respiratory status: acceptable  Hydration status: euvolemic  Pain management: adequate    No notable events documented.

## 2025-06-30 NOTE — DISCHARGE SUMMARY
[facility]  REEMA Wick D.O.  [phone]            COLONOSCOPY DISCHARGE INSTRUCTIONS    Andreea Aly  [id number]  1965    DISCOMFORT:  Redness at IV site- apply warm compress to area; if redness or soreness persist- contact your physician  There may be a slight amount of blood passed from the rectum  Gaseous discomfort- walking, belching will help relieve any discomfort  You may not operate a vehicle for 12 hours  You may not engage in an occupation involving machinery or appliances for the  rest of today  You may not drink alcoholic beverages for at least 12 hours  Avoid making any critical decisions for at least 24 hours    DIET:   You may resume your normal diet, but some patients find that heavy or large meals may lead to indigestion or vomiting. I suggest a light meal as first food intake.    ACTIVITY:  You may resume your normal daily activities.  It is recommended that you spend the remainder of the day resting - avoid any strenuous activity.    CALL M.DTatiana IF ANY SIGN OF:   Increasing pain, nausea, vomiting  Abdominal distension (swelling)  Significant bleeding (oral or rectal)  Fever   Pain in chest area  Shortness of breath    Additional Instructions:   Call Dr. Wick if any questions or problems at [phone]   You should receive the biopsy results by phone or mail within 3 weeks, if not, call  my office for the results      Colonoscopy showed a lipoma in your colon which is benign fatty tissue that does not require any intervention. Otherwise, it was normal.    Should have a repeat colonoscopy in 10 years.    Resume regular medications    Follow-up at your convenience.      REEMA Wick D.O.  [facility]

## 2025-06-30 NOTE — DISCHARGE INSTRUCTIONS
[facility]  REEMA Wick D.O.  [phone]            COLONOSCOPY DISCHARGE INSTRUCTIONS    Andreea Aly  [id number]  1965    DISCOMFORT:  Redness at IV site- apply warm compress to area; if redness or soreness persist- contact your physician  There may be a slight amount of blood passed from the rectum  Gaseous discomfort- walking, belching will help relieve any discomfort  You may not operate a vehicle for 12 hours  You may not engage in an occupation involving machinery or appliances for the  rest of today  You may not drink alcoholic beverages for at least 12 hours  Avoid making any critical decisions for at least 24 hours    DIET:   You may resume your normal diet, but some patients find that heavy or large meals may lead to indigestion or vomiting. I suggest a light meal as first food intake.    ACTIVITY:  You may resume your normal daily activities.  It is recommended that you spend the remainder of the day resting - avoid any strenuous activity.    CALL M.D. IF ANY SIGN OF:   Increasing pain, nausea, vomiting  Abdominal distension (swelling)  Significant bleeding (oral or rectal)  Fever   Pain in chest area  Shortness of breath    Additional Instructions:   Call Dr. Wick if any questions or problems at [phone]   You should receive the biopsy results by phone or mail within 3 weeks, if not, call  my office for the results      Colonoscopy showed a lipoma in your colon which is benign fatty tissue that does not require any intervention. Otherwise, it was normal.    Should have a repeat colonoscopy in 10 years.    Resume regular medications    Follow-up at your convenience.      REEMA Wick D.O.  [facility]        Patient Education on Sedation / Analgesia Administered for Procedure      For 24 hours after general anesthesia or intravenous analgesia / sedation:  Have someone responsible help you with your care  Limit your

## 2025-06-30 NOTE — PROGRESS NOTES
ARRIVAL INFORMATION:  Verified patient name and date of birth, scheduled procedure, and informed consent.     : Francy (caregiver) contact number: [phone]  Physician and staff can share information with the .     Receive texts: Yes    Belongings with patient include:  Clothing,Glasses    GI FOCUSED ASSESSMENT:  Neuro: Awake, alert, oriented x4  Respiratory: even and unlabored   GI: soft and non-distended  EKG Rhythm: normal sinus rhythm    Education:Reviewed general discharge instructions and  information.

## 2025-06-30 NOTE — PROGRESS NOTES
Endoscopy Case End Note:     Procedure scope was pre-cleaned, per protocol, at bedside by KERRIE Monroy.       Report received from anesthesia.  See anesthesia flowsheet for intra-procedure vital signs and events.    Glasses returned to patient. Belongings remain under stretcher with patient.

## 2025-06-30 NOTE — H&P
[facility]  MARIUMTatiana Workmanal Delano CHEATHAM  [phone]          Pre-procedure History and Physical       NAME:  Andreea Ayl   :   1965   MRN:   [id number]     CHIEF COMPLAINT/HPI:     60 y.o. female here for colonoscopy for personal hx of colon polyps  Last colonoscopy: 2019 (hyperplastic polyps at that time).     PMH:  Past Medical History:   Diagnosis Date    Acute bronchitis 2015    Acute sinusitis     Anemia     Ankle fracture, left     no hardware    Anxiety     Arthritis     Asthma     Back pain     Hernandez's cyst 06/10/2024    Brain aneurysm     embolization 2x    Chest pain     Delivery normal     x4    Dizziness     Fatigue     Gastrointestinal disorder     \"stomach ulcer\"    Headache     History of blood transfusion     ABD. BLEEDING    Hyperlipidemia     NON COMPLIANT W/ MEDICATION    Hypertension     CHALINO on CPAP     Osteoarthritis     Palpitations     Peptic ulcer disease     Ringing in ears     Sciatica     Skipped beats     Stroke (HCC)     WEAKNESS ON LEFT SIDE OF BODY    TIA (transient ischemic attack) 2023       PSH:  Past Surgical History:   Procedure Laterality Date    CEREBRAL EMBOLIZATION  2024    embolization    CEREBRAL EMBOLIZATION  2024    Pipeline embolization    COLONOSCOPY      ENDOSCOPY, COLON, DIAGNOSTIC      EYE SURGERY Bilateral     BLEPHOROPLASTY    [place] UNLISTED PROCEDURE ABDOMEN PERITONEUM & OMENTUM      FOR BLEEDING ULCER    SINUS SURGERY      TONSILLECTOMY      TUBAL LIGATION         Allergies:  Allergies   Allergen Reactions    Morphine Headaches    Penicillins Anaphylaxis     Has taken cephalexin without problem    Hydrocodone Hives and Itching       Home Medications:  Prior to Admission Medications   Prescriptions Last Dose Informant Patient Reported? Taking?   ALPRAZolam (XANAX) 1 MG tablet   Yes No   Sig: Take 1 tablet by mouth 3 times daily as needed for Anxiety.   Fluticasone-Umeclidin-Vilant (TRELEGY ELLIPTA IN)

## 2025-06-30 NOTE — OP NOTE
NAME:  Andreea Aly   :   1965   MRN:   [id number]     Date/Time:  2025 2:13 PM    Colonoscopy Operative Report    Procedure Type:   Colonoscopy with polypectomy (cold biopsy)     Indications:   Screening, personal hx of polyps  Pre-operative Diagnosis: see indication above  Post-operative Diagnosis:  See findings below  :  REEMA Wick D.O.  Referring Provider: Pily Kat PA    Exam:  Airway: clear, no airway problems anticipated  Heart: RRR, without gallops or rubs  Lungs: clear bilaterally without wheezes, crackles, or rhonchi  Abdomen: soft, nontender, nondistended, bowel sounds present  Mental Status: awake, alert and oriented to person, place and time    Sedation:  MAC anesthesia Propofol  Procedure Details:  After informed consent was obtained with all risks and benefits of procedure explained and preoperative exam completed, the patient was taken to the endoscopy suite and placed in the left lateral decubitus position.  Upon sequential sedation as per above, a digital rectal exam was performed which was normal.  The Olympus videocolonoscope  was inserted in the rectum and carefully advanced to the terminal ileum w/ identification of the ileocecal valve and appendiceal orifice.   The quality of preparation was good.  The colonoscope was slowly withdrawn with careful evaluation between folds. Retroflexion in the rectum was completed demonstrating internal hemorrhoids.     Findings:   Anus/Tri-anal exam:  Normal  Rectum:   Normal  Sigmoid:   Normal  Descending Colon:   Normal  Transverse Colon:   Normal  Ascending Colon:   Large 4cm lipoma seen in the proximal ascending colon. +pillow sign. Biopsies of mucosa reveal underlying yellowish submucosa consistent with lipoma. Sent for pathology.   Otherwise normal.   Cecum:   Normal  Terminal Ileum:   Normal    Specimen Removed:    ID Type Source Tests Collected by Time Destination   1 : Ascending Colon Lypoma BXS Tissue Tissue

## (undated) DEVICE — IV START KIT: Brand: MEDLINE

## (undated) DEVICE — ENDOSCOPIC KIT COMPLIANCE ENDOKIT

## (undated) DEVICE — FORCEPS BX L240CM JAW DIA2.4MM ORNG L CAP W/ NDL DISP RAD

## (undated) DEVICE — CONTAINER SPEC 20 ML LID NEUT BUFF FORMALIN 10 % POLYPR STS

## (undated) DEVICE — CUFF BLD PRSS AD CLTH SGL TB W/ BAYNT CONN ROUNDED CORNER

## (undated) DEVICE — TIP SUCT TRNSPAR RIB SURF STD BLB RIG NVENT W/ 5IN1 CONN DYND50138] MEDLINE INDUSTRIES INC]

## (undated) DEVICE — SET GRAV CK VLV NEEDLESS ST 3 GANGED 4WAY STPCOCK HI FLO 10